# Patient Record
Sex: FEMALE | Race: WHITE | NOT HISPANIC OR LATINO | Employment: PART TIME | URBAN - METROPOLITAN AREA
[De-identification: names, ages, dates, MRNs, and addresses within clinical notes are randomized per-mention and may not be internally consistent; named-entity substitution may affect disease eponyms.]

---

## 2017-04-26 ENCOUNTER — GENERIC CONVERSION - ENCOUNTER (OUTPATIENT)
Dept: OTHER | Facility: OTHER | Age: 27
End: 2017-04-26

## 2017-05-25 ENCOUNTER — ALLSCRIPTS OFFICE VISIT (OUTPATIENT)
Dept: OTHER | Facility: OTHER | Age: 27
End: 2017-05-25

## 2017-05-25 DIAGNOSIS — E66.01 MORBID (SEVERE) OBESITY DUE TO EXCESS CALORIES (HCC): ICD-10-CM

## 2017-06-28 ENCOUNTER — GENERIC CONVERSION - ENCOUNTER (OUTPATIENT)
Dept: OTHER | Facility: OTHER | Age: 27
End: 2017-06-28

## 2017-07-03 RX ORDER — SERTRALINE HYDROCHLORIDE 100 MG/1
150 TABLET, FILM COATED ORAL DAILY
COMMUNITY
End: 2018-03-24 | Stop reason: SDUPTHER

## 2017-07-04 ENCOUNTER — ANESTHESIA EVENT (OUTPATIENT)
Dept: GASTROENTEROLOGY | Facility: HOSPITAL | Age: 27
End: 2017-07-04
Payer: COMMERCIAL

## 2017-07-05 ENCOUNTER — ANESTHESIA (OUTPATIENT)
Dept: GASTROENTEROLOGY | Facility: HOSPITAL | Age: 27
End: 2017-07-05
Payer: COMMERCIAL

## 2017-07-05 ENCOUNTER — HOSPITAL ENCOUNTER (OUTPATIENT)
Facility: HOSPITAL | Age: 27
Setting detail: OUTPATIENT SURGERY
Discharge: HOME | End: 2017-07-05
Attending: SURGERY | Admitting: SURGERY
Payer: COMMERCIAL

## 2017-07-05 VITALS
HEIGHT: 65 IN | WEIGHT: 255 LBS | RESPIRATION RATE: 16 BRPM | SYSTOLIC BLOOD PRESSURE: 118 MMHG | HEART RATE: 78 BPM | BODY MASS INDEX: 42.49 KG/M2 | OXYGEN SATURATION: 100 % | DIASTOLIC BLOOD PRESSURE: 58 MMHG | TEMPERATURE: 97.8 F

## 2017-07-05 DIAGNOSIS — E66.9 OBESITY: ICD-10-CM

## 2017-07-05 LAB — EXT PREGNANCY TEST URINE: NEGATIVE

## 2017-07-05 PROCEDURE — 88305 TISSUE EXAM BY PATHOLOGIST: CPT | Performed by: SURGERY

## 2017-07-05 PROCEDURE — 88342 IMHCHEM/IMCYTCHM 1ST ANTB: CPT | Performed by: SURGERY

## 2017-07-05 PROCEDURE — 81025 URINE PREGNANCY TEST: CPT | Performed by: ANESTHESIOLOGY

## 2017-07-05 RX ORDER — SODIUM CHLORIDE 9 MG/ML
125 INJECTION, SOLUTION INTRAVENOUS CONTINUOUS
Status: DISCONTINUED | OUTPATIENT
Start: 2017-07-05 | End: 2017-07-05 | Stop reason: HOSPADM

## 2017-07-05 RX ORDER — PROPOFOL 10 MG/ML
INJECTION, EMULSION INTRAVENOUS AS NEEDED
Status: DISCONTINUED | OUTPATIENT
Start: 2017-07-05 | End: 2017-07-05 | Stop reason: SURG

## 2017-07-05 RX ADMIN — PROPOFOL 100 MG: 10 INJECTION, EMULSION INTRAVENOUS at 08:57

## 2017-07-05 RX ADMIN — LIDOCAINE HYDROCHLORIDE 100 MG: 20 INJECTION, SOLUTION INTRAVENOUS at 08:55

## 2017-07-05 RX ADMIN — PROPOFOL 100 MG: 10 INJECTION, EMULSION INTRAVENOUS at 08:55

## 2017-07-05 RX ADMIN — SODIUM CHLORIDE 125 ML/HR: 0.9 INJECTION, SOLUTION INTRAVENOUS at 08:40

## 2017-07-10 ENCOUNTER — ALLSCRIPTS OFFICE VISIT (OUTPATIENT)
Dept: OTHER | Facility: OTHER | Age: 27
End: 2017-07-10

## 2017-07-11 ENCOUNTER — GENERIC CONVERSION - ENCOUNTER (OUTPATIENT)
Dept: OTHER | Facility: OTHER | Age: 27
End: 2017-07-11

## 2017-12-27 ENCOUNTER — ALLSCRIPTS OFFICE VISIT (OUTPATIENT)
Dept: OTHER | Facility: OTHER | Age: 27
End: 2017-12-27
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
## 2018-01-12 VITALS
WEIGHT: 258.03 LBS | DIASTOLIC BLOOD PRESSURE: 80 MMHG | SYSTOLIC BLOOD PRESSURE: 122 MMHG | TEMPERATURE: 98.1 F | BODY MASS INDEX: 41.47 KG/M2 | HEART RATE: 79 BPM | HEIGHT: 66 IN | RESPIRATION RATE: 18 BRPM

## 2018-01-12 VITALS
SYSTOLIC BLOOD PRESSURE: 104 MMHG | DIASTOLIC BLOOD PRESSURE: 60 MMHG | WEIGHT: 261.6 LBS | TEMPERATURE: 97.5 F | HEIGHT: 66 IN | RESPIRATION RATE: 18 BRPM | BODY MASS INDEX: 42.04 KG/M2 | HEART RATE: 74 BPM

## 2018-01-13 VITALS — BODY MASS INDEX: 40.5 KG/M2 | HEIGHT: 66 IN | WEIGHT: 252 LBS

## 2018-01-13 VITALS — WEIGHT: 261.6 LBS | BODY MASS INDEX: 42.04 KG/M2 | HEIGHT: 66 IN

## 2018-01-15 NOTE — RESULT NOTES
Verified Results  (Q) CBC (INCLUDES DIFF/PLT) (REFL) [id number] 12:00AM Rayna New     Test Name Result Flag Reference   WHITE BLOOD CELL COUNT 8 7 Thousand/uL  3 8-10 8   RED BLOOD CELL COUNT 4 94 Million/uL  3 80-5 10   HEMOGLOBIN 13 9 g/dL  11 7-15 5   HEMATOCRIT 42 9 %  35 0-45 0   MCV 86 9 fL  80 0-100 0   MCH 28 2 pg  27 0-33 0   MCHC 32 5 g/dL  32 0-36 0   RDW 14 2 %  11 0-15 0   PLATELET COUNT 272 Thousand/uL  140-400   MPV 8 0 fL  7 5-11 5   ABSOLUTE NEUTROPHILS 6029 cells/uL  6263-6883   ABSOLUTE LYMPHOCYTES 2010 cells/uL  850-3900   ABSOLUTE MONOCYTES 487 cells/uL  200-950   ABSOLUTE EOSINOPHILS 131 cells/uL     ABSOLUTE BASOPHILS 44 cells/uL  0-200   NEUTROPHILS 69 3 %     LYMPHOCYTES 23 1 %     MONOCYTES 5 6 %     EOSINOPHILS 1 5 %     BASOPHILS 0 5 %       (Q) COMPREHENSIVE METABOLIC PNL W/ADJUSTED CALCIUM 00Qzr1171 12:00AM timeplazza New     Test Name Result Flag Reference   GLUCOSE 97 mg/dL  65-99   Fasting reference interval   UREA NITROGEN (BUN) 15 mg/dL  7-25   CREATININE 0 63 mg/dL  0 50-1 10   eGFR NON-AFR   AMERICAN 126 mL/min/1 73m2  > OR = 60   eGFR AFRICAN AMERICAN 146 mL/min/1 73m2  > OR = 60   BUN/CREATININE RATIO   7-15   NOT APPLICABLE (calc)   SODIUM 139 mmol/L  135-146   POTASSIUM 4 3 mmol/L  3 5-5 3   CHLORIDE 105 mmol/L     CARBON DIOXIDE 21 mmol/L  20-31   CALCIUM 9 5 mg/dL  8 6-10 2   CALCIUM (ADJUSTED FOR$ALBUMIN) 9 4 mg/dL (calc)  8 6-10 2   PROTEIN, TOTAL 7 5 g/dL  6 1-8 1   ALBUMIN 4 5 g/dL  3 6-5 1   GLOBULIN 3 0 g/dL (calc)  1 9-3 7   ALBUMIN/GLOBULIN RATIO 1 5 (calc)  1 0-2 5   BILIRUBIN, TOTAL 0 3 mg/dL  0 2-1 2   ALKALINE PHOSPHATASE 103 U/L     AST 12 U/L  10-30   ALT 13 U/L  6-29     (Q) TSH, 3RD GENERATION 13Fvv4490 12:00AM timeplazza New     Test Name Result Flag Reference   TSH 1 67 mIU/L     Reference Range                         > or = 20 Years  0 40-4 50                              Pregnancy Ranges            First trimester    0 26-2 66 Second trimester   0 55-2 73            Third trimester    0 43-2 91
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
## 2018-01-23 VITALS
SYSTOLIC BLOOD PRESSURE: 116 MMHG | HEIGHT: 66 IN | DIASTOLIC BLOOD PRESSURE: 68 MMHG | TEMPERATURE: 97.6 F | HEART RATE: 127 BPM | OXYGEN SATURATION: 97 % | BODY MASS INDEX: 43.39 KG/M2 | WEIGHT: 270 LBS

## 2018-03-24 DIAGNOSIS — F33.0 MILD EPISODE OF RECURRENT MAJOR DEPRESSIVE DISORDER (HCC): Primary | ICD-10-CM

## 2018-03-26 RX ORDER — SERTRALINE HYDROCHLORIDE 100 MG/1
TABLET, FILM COATED ORAL
Qty: 135 TABLET | Refills: 0 | Status: SHIPPED | OUTPATIENT
Start: 2018-03-26 | End: 2018-06-27 | Stop reason: SDUPTHER

## 2018-06-27 DIAGNOSIS — Z13.29 SCREENING FOR ENDOCRINE, METABOLIC AND IMMUNITY DISORDER: ICD-10-CM

## 2018-06-27 DIAGNOSIS — Z13.0 SCREENING FOR DEFICIENCY ANEMIA: ICD-10-CM

## 2018-06-27 DIAGNOSIS — Z13.228 SCREENING FOR ENDOCRINE, METABOLIC AND IMMUNITY DISORDER: ICD-10-CM

## 2018-06-27 DIAGNOSIS — R53.82 CHRONIC FATIGUE: ICD-10-CM

## 2018-06-27 DIAGNOSIS — Z13.0 SCREENING FOR ENDOCRINE, METABOLIC AND IMMUNITY DISORDER: ICD-10-CM

## 2018-06-27 DIAGNOSIS — Z13.1 SCREENING FOR DIABETES MELLITUS (DM): ICD-10-CM

## 2018-06-27 DIAGNOSIS — F33.0 MILD EPISODE OF RECURRENT MAJOR DEPRESSIVE DISORDER (HCC): ICD-10-CM

## 2018-06-27 DIAGNOSIS — Z13.220 SCREENING FOR LIPID DISORDERS: ICD-10-CM

## 2018-06-27 DIAGNOSIS — D64.9 ANEMIA, UNSPECIFIED TYPE: Primary | ICD-10-CM

## 2018-06-27 PROBLEM — Z87.59 HISTORY OF POSTPARTUM DEPRESSION: Status: ACTIVE | Noted: 2017-08-03

## 2018-06-27 PROBLEM — Z82.79 FAMILY HISTORY OF CONGENITAL HEART DEFECT: Status: ACTIVE | Noted: 2017-09-11

## 2018-06-27 PROBLEM — O09.90 HRP (HIGH RISK PREGNANCY): Status: ACTIVE | Noted: 2017-08-03

## 2018-06-27 PROBLEM — D62 ACUTE BLOOD LOSS ANEMIA: Status: ACTIVE | Noted: 2018-03-13

## 2018-06-27 PROBLEM — O09.891 MEDICATION EXPOSURE DURING FIRST TRIMESTER OF PREGNANCY: Status: ACTIVE | Noted: 2017-08-03

## 2018-06-27 PROBLEM — O99.340 DEPRESSION AFFECTING PREGNANCY: Status: ACTIVE | Noted: 2017-08-03

## 2018-06-27 PROBLEM — F32.A DEPRESSION AFFECTING PREGNANCY: Status: ACTIVE | Noted: 2017-08-03

## 2018-06-27 PROBLEM — O09.299 HISTORY OF MACROSOMIA IN INFANT IN PRIOR PREGNANCY, CURRENTLY PREGNANT: Status: ACTIVE | Noted: 2017-09-11

## 2018-06-27 PROBLEM — O99.210 MATERNAL OBESITY AFFECTING PREGNANCY, ANTEPARTUM: Status: ACTIVE | Noted: 2017-08-03

## 2018-06-27 PROBLEM — Z98.891 HISTORY OF CESAREAN SECTION: Status: ACTIVE | Noted: 2017-08-03

## 2018-06-27 PROBLEM — O34.219 PREVIOUS CESAREAN SECTION COMPLICATING PREGNANCY, ANTEPARTUM CONDITION OR COMPLICATION: Status: ACTIVE | Noted: 2018-01-18

## 2018-06-27 PROBLEM — Z86.59 HISTORY OF POSTPARTUM DEPRESSION: Status: ACTIVE | Noted: 2017-08-03

## 2018-06-27 PROBLEM — O40.9XX0 POLYHYDRAMNIOS, ANTEPARTUM COMPLICATION: Status: ACTIVE | Noted: 2018-02-21

## 2018-06-27 PROBLEM — O34.219 MATERNAL CARE FOR SCAR FROM PREVIOUS CESAREAN DELIVERY: Status: ACTIVE | Noted: 2018-03-12

## 2018-06-27 PROBLEM — E66.01 MORBID OBESITY (HCC): Status: ACTIVE | Noted: 2017-04-26

## 2018-06-27 RX ORDER — SERTRALINE HYDROCHLORIDE 100 MG/1
TABLET, FILM COATED ORAL
Qty: 135 TABLET | Refills: 0 | Status: SHIPPED | OUTPATIENT
Start: 2018-06-27 | End: 2018-12-14 | Stop reason: SDUPTHER

## 2018-06-27 NOTE — TELEPHONE ENCOUNTER
Pt will need blood work for being on medication on a daily basis,  I will enter for [facility] since she has [place]

## 2018-12-11 ENCOUNTER — TELEPHONE (OUTPATIENT)
Dept: FAMILY MEDICINE CLINIC | Facility: CLINIC | Age: 28
End: 2018-12-11

## 2018-12-11 ENCOUNTER — CLINICAL SUPPORT (OUTPATIENT)
Dept: BARIATRICS | Facility: CLINIC | Age: 28
End: 2018-12-11

## 2018-12-11 VITALS — WEIGHT: 275 LBS | HEIGHT: 65 IN | BODY MASS INDEX: 45.82 KG/M2

## 2018-12-11 DIAGNOSIS — E66.01 OBESITY, CLASS III, BMI 40-49.9 (MORBID OBESITY) (HCC): Primary | ICD-10-CM

## 2018-12-11 DIAGNOSIS — E66.01 MORBID OBESITY (HCC): Primary | ICD-10-CM

## 2018-12-11 PROCEDURE — RECHECK: Performed by: DIETITIAN, REGISTERED

## 2018-12-11 NOTE — PROGRESS NOTES
Bariatric Behavioral Health Evaluation    Presenting Problem:  Patient has been unsuccessful with weight loss efforts  Patient wants to improve quality of life  Is the patient seeking Bariatric Surgery Eval? Yes  If yes how long have you researched this surgery option  Patient has considered bariatric surgery for 2 years  Realizes Post- Op Requirements? Yes, patient has acquaintances with bariatric surgery  Psychiatric/Psychological Treatment Diagnosis: In  patient diagnosed with post partum depression  Patient currently diagnosed with depression  She is monitored and treated by PCP  Outpatient Counselor No     Psychiatrist No     Have you had Inpatient Treatment? No    Family Constellation (include relationship with each and Psych/Med HX) None reported   Mother is 62years old, father is 54years old, 2 living sisters and 5 brothers  One sister  at 15years of age( 2018)   1 year; patient has 2 children  Domestic Violence No    Abuse History: None reported     Additional comments/stressors related to family/relationships/peer support; grief( sister's death)    Physical/Psychological Assessment:     Appearance: appropriate  Sociability: average  Affect: appropriate  Mood: calm  Thought Process: coherent  Speech: normal  Content: no impairment  Orientation: person  Yes   Insight: emotional  good    Risk Assessment:     none    Recommendations: Recommended for surgery  yes    Risk of Harm to Self or Others: None reported     Access to weapons yes     Weapons secured by; hunting  Weapons are in a gun safe  Based on the previous information, the client presents the following risk of harm to self or others: low     Note   Completed Behavioral Health Assessment  Provided patient education as needed  Patient admits  to  Axis 1 diagnosis and is currently taking medication prescribed by PCP  Chano Cerrato  Patient  meets criteria for [facility] b[facility]  surgery program and is therefore referred to surgeon  David Henning  Completed Behavioral Health Assessment  Provided patient education as needed  Patient denies/admits  to  Axis 1 diagnosis and is currently taking medication prescribed by PCP or psychiatrist  Patient  meets criteria for [address] [place] [address] bariatric  surgery program and is therefore referred to surgeon

## 2018-12-14 ENCOUNTER — OFFICE VISIT (OUTPATIENT)
Dept: FAMILY MEDICINE CLINIC | Facility: CLINIC | Age: 28
End: 2018-12-14
Payer: COMMERCIAL

## 2018-12-14 VITALS
BODY MASS INDEX: 45.82 KG/M2 | HEART RATE: 98 BPM | SYSTOLIC BLOOD PRESSURE: 112 MMHG | OXYGEN SATURATION: 98 % | DIASTOLIC BLOOD PRESSURE: 80 MMHG | WEIGHT: 275 LBS | TEMPERATURE: 97.7 F | HEIGHT: 65 IN

## 2018-12-14 DIAGNOSIS — F33.0 MILD EPISODE OF RECURRENT MAJOR DEPRESSIVE DISORDER (HCC): ICD-10-CM

## 2018-12-14 DIAGNOSIS — F41.9 ANXIETY: Primary | ICD-10-CM

## 2018-12-14 DIAGNOSIS — R53.82 CHRONIC FATIGUE: ICD-10-CM

## 2018-12-14 PROBLEM — O09.299 HISTORY OF MACROSOMIA IN INFANT IN PRIOR PREGNANCY, CURRENTLY PREGNANT: Status: RESOLVED | Noted: 2017-09-11 | Resolved: 2018-12-14

## 2018-12-14 PROBLEM — O99.210 MATERNAL OBESITY AFFECTING PREGNANCY, ANTEPARTUM: Status: RESOLVED | Noted: 2017-08-03 | Resolved: 2018-12-14

## 2018-12-14 PROCEDURE — 3008F BODY MASS INDEX DOCD: CPT | Performed by: FAMILY MEDICINE

## 2018-12-14 PROCEDURE — 99214 OFFICE O/P EST MOD 30 MIN: CPT | Performed by: FAMILY MEDICINE

## 2018-12-14 PROCEDURE — 1036F TOBACCO NON-USER: CPT | Performed by: FAMILY MEDICINE

## 2018-12-14 RX ORDER — HYDROXYZINE HYDROCHLORIDE 25 MG/1
25 TABLET, FILM COATED ORAL 3 TIMES DAILY PRN
Qty: 90 TABLET | Refills: 0 | Status: SHIPPED | OUTPATIENT
Start: 2018-12-14 | End: 2019-02-27 | Stop reason: ALTCHOICE

## 2018-12-14 RX ORDER — SERTRALINE HYDROCHLORIDE 100 MG/1
150 TABLET, FILM COATED ORAL DAILY
Qty: 135 TABLET | Refills: 0 | Status: SHIPPED | OUTPATIENT
Start: 2018-12-14 | End: 2019-09-27

## 2018-12-14 NOTE — PATIENT INSTRUCTIONS
PT WILL BE GETTING BLOOD WORK FOR BARIATRIC SURGEON  Proceeding with WEIGHT LOSS SURGERY  RENEWAL OF SERTRALINE, CONSIDER INCREASING TO 200MG DAILY   ATARAX 25MG 1/2-1 TAB 3 TIMES A DAY AS NEEDED   CONSIDER CHANGING TO [facility] WHEN AVAILABLE

## 2018-12-14 NOTE — PROGRESS NOTES
Subjective:   Chief Complaint   Patient presents with    Follow-up     PT IS HERE FOR RX REFILLS  PT WILL NEED REFERRAL FOR BARIATRIC SURGERY  Patient ID: Michael Mitchell is a 33 y o  female  PT BMI 45 76 TODAY, AND QUALIFIES FOR WEIGHT LOSS SURGERY  HAD EVALUATION 1 YEAR AGO AND  THEN COULD NOT COMPLETE DUE TO PREGNANCY, NOW WOULD LIKE TO RESTART PROCESS  Has been trying to lose weight  She will get full blood work up with bariatric surgeon  Increased stress and anxiety  The following portions of the patient's history were reviewed and updated as appropriate: allergies, current medications, past family history, past medical history, past social history, past surgical history and problem list     Review of Systems   Constitutional: Positive for fatigue  Negative for fever  HENT: Negative  Eyes: Negative  Respiratory: Negative  Negative for cough  Cardiovascular: Negative  Gastrointestinal: Negative  Endocrine: Negative  Genitourinary: Negative  Musculoskeletal: Negative  Skin: Negative  Allergic/Immunologic: Negative  Neurological: Negative  Psychiatric/Behavioral: Negative  Objective:  Vitals:    12/14/18 1031   BP: 112/80   Pulse: 98   Temp: 97 7 °F (36 5 °C)   SpO2: 98%   Weight: 125 kg (275 lb)   Height: 5' 5" (1 651 m)      Physical Exam   Constitutional: She is oriented to person, place, and time  She appears well-developed and well-nourished  obese   HENT:   Head: Normocephalic and atraumatic  Cardiovascular: Normal rate, regular rhythm and normal heart sounds  Pulmonary/Chest: Effort normal and breath sounds normal    Abdominal: Soft  Bowel sounds are normal    Neurological: She is alert and oriented to person, place, and time  Skin: Skin is warm and dry  Psychiatric: She has a normal mood and affect  Her behavior is normal  Judgment and thought content normal    Nursing note and vitals reviewed          Assessment/Plan:    No problem-specific Assessment & Plan notes found for this encounter  Diagnoses and all orders for this visit:    Anxiety  Comments:  add atarax 1/2-1 tab 3 times a day as needed   consider changing to buspar when available  consider increasing sertraline  Orders:  -     hydrOXYzine HCL (ATARAX) 25 mg tablet; Take 1 tablet (25 mg total) by mouth 3 (three) times a day as needed for anxiety    Mild episode of recurrent major depressive disorder ([facility] [address] )  Comments:  consider increase in sertraline, pt would prefer as needed medication currently  Orders:  -     sertraline (ZOLOFT) 100 mg tablet; Take 1 5 tablets (150 mg total) by mouth daily    Chronic fatigue  Comments:  will bet blood work done for bariatric surgery - cbc,tsh    BMI 45 0-49 9, adult [facility])  Comments:  continue with bariatric workup   Orders:  -     Ambulatory referral to Bariatric Surgery; Future    Other orders  -     Discontinue: Ascorbic Acid, Vitamin C, (VITAMIN C) 100 MG tablet; Take 250 mg by mouth  -     Prenatal Multivit-Min-Fe-FA (PRE-CHRISTINE PO);  Take by mouth

## 2019-01-09 ENCOUNTER — OFFICE VISIT (OUTPATIENT)
Dept: CARDIOLOGY CLINIC | Facility: CLINIC | Age: 29
End: 2019-01-09
Payer: COMMERCIAL

## 2019-01-09 VITALS
SYSTOLIC BLOOD PRESSURE: 110 MMHG | HEIGHT: 65 IN | WEIGHT: 282 LBS | DIASTOLIC BLOOD PRESSURE: 80 MMHG | HEART RATE: 74 BPM | BODY MASS INDEX: 46.98 KG/M2

## 2019-01-09 DIAGNOSIS — E66.01 MORBID OBESITY (HCC): Primary | ICD-10-CM

## 2019-01-09 DIAGNOSIS — Z01.810 PREOP CARDIOVASCULAR EXAM: ICD-10-CM

## 2019-01-09 PROCEDURE — 93000 ELECTROCARDIOGRAM COMPLETE: CPT | Performed by: INTERNAL MEDICINE

## 2019-01-09 PROCEDURE — 99214 OFFICE O/P EST MOD 30 MIN: CPT | Performed by: INTERNAL MEDICINE
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
## 2019-01-11 ENCOUNTER — OFFICE VISIT (OUTPATIENT)
Dept: BARIATRICS | Facility: CLINIC | Age: 29
End: 2019-01-11
Payer: COMMERCIAL

## 2019-01-11 VITALS
BODY MASS INDEX: 44.84 KG/M2 | SYSTOLIC BLOOD PRESSURE: 112 MMHG | TEMPERATURE: 97.5 F | HEIGHT: 66 IN | WEIGHT: 279 LBS | HEART RATE: 80 BPM | DIASTOLIC BLOOD PRESSURE: 84 MMHG

## 2019-01-11 DIAGNOSIS — F41.9 ANXIETY: ICD-10-CM

## 2019-01-11 DIAGNOSIS — E66.01 MORBID OBESITY (HCC): Primary | ICD-10-CM

## 2019-01-11 PROCEDURE — 99214 OFFICE O/P EST MOD 30 MIN: CPT | Performed by: SURGERY
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
## 2019-01-14 ENCOUNTER — HOSPITAL ENCOUNTER (OUTPATIENT)
Dept: NON INVASIVE DIAGNOSTICS | Facility: CLINIC | Age: 29
Discharge: HOME | End: 2019-01-14
Payer: COMMERCIAL

## 2019-01-14 DIAGNOSIS — Z01.810 PREOP CARDIOVASCULAR EXAM: ICD-10-CM

## 2019-01-14 PROCEDURE — 93018 CV STRESS TEST I&R ONLY: CPT | Performed by: INTERNAL MEDICINE

## 2019-01-14 PROCEDURE — 93016 CV STRESS TEST SUPVJ ONLY: CPT | Performed by: INTERNAL MEDICINE

## 2019-01-14 PROCEDURE — 93017 CV STRESS TEST TRACING ONLY: CPT

## 2019-01-15 LAB
CHEST PAIN STATEMENT: NORMAL
MAX DIASTOLIC BP: 60 MMHG
MAX HEART RATE: 190 BPM
MAX PREDICTED HEART RATE: 193 BPM
MAX. SYSTOLIC BP: 170 MMHG
PROTOCOL NAME: NORMAL
REASON FOR TERMINATION: NORMAL
TARGET HR FORMULA: NORMAL
TEST INDICATION: NORMAL
TIME IN EXERCISE PHASE: NORMAL

## 2019-01-16 ENCOUNTER — TELEPHONE (OUTPATIENT)
Dept: BARIATRICS | Facility: CLINIC | Age: 29
End: 2019-01-16

## 2019-01-16 ENCOUNTER — TELEPHONE (OUTPATIENT)
Dept: CARDIOLOGY CLINIC | Facility: CLINIC | Age: 29
End: 2019-01-16

## 2019-01-16 NOTE — TELEPHONE ENCOUNTER
Spoke to [facility] @ Dr Putnam Failing office in regards to cardiac clearance she reviewed notes stated patient had no contradictions and will have a note or addendum added to last office visit

## 2019-01-16 NOTE — TELEPHONE ENCOUNTER
Pt called requesting results for Stress test   Pt was informed that test appeared to be normal   If Dr Mindy Black had anything to add pt would receive a call with that information  Eleonora from [facility] also called stating she needed a letter added to pt's chart clearing pt for Bariatric Sx  Date TBD  Letter was sent to Dr Mindy Black to be signed and in chart for reference

## 2019-01-18 DIAGNOSIS — Z01.812 BLOOD TESTS PRIOR TO TREATMENT OR PROCEDURE: ICD-10-CM

## 2019-01-20 LAB
25(OH)D3 SERPL-MCNC: 13 NG/ML (ref 30–100)
ALBUMIN SERPL-MCNC: 4.4 G/DL (ref 3.6–5.1)
ALBUMIN/GLOB SERPL: 1.5 (CALC) (ref 1–2.5)
ALP SERPL-CCNC: 82 U/L (ref 33–115)
ALT SERPL-CCNC: 11 U/L (ref 6–29)
AST SERPL-CCNC: 13 U/L (ref 10–30)
BASOPHILS # BLD AUTO: 37 CELLS/UL (ref 0–200)
BASOPHILS NFR BLD AUTO: 0.6 %
BILIRUB SERPL-MCNC: 0.4 MG/DL (ref 0.2–1.2)
BUN SERPL-MCNC: 13 MG/DL (ref 7–25)
BUN/CREAT SERPL: NORMAL (CALC) (ref 6–22)
CALCIUM SERPL-MCNC: 9 MG/DL (ref 8.6–10.2)
CHLORIDE SERPL-SCNC: 105 MMOL/L (ref 98–110)
CHOLEST SERPL-MCNC: 195 MG/DL
CHOLEST/HDLC SERPL: 4.2 (CALC)
CO2 SERPL-SCNC: 26 MMOL/L (ref 20–32)
CREAT SERPL-MCNC: 0.66 MG/DL (ref 0.5–1.1)
EOSINOPHIL # BLD AUTO: 81 CELLS/UL (ref 15–500)
EOSINOPHIL NFR BLD AUTO: 1.3 %
ERYTHROCYTE [DISTWIDTH] IN BLOOD BY AUTOMATED COUNT: 13.4 % (ref 11–15)
GLOBULIN SER CALC-MCNC: 2.9 G/DL (CALC) (ref 1.9–3.7)
GLUCOSE SERPL-MCNC: 84 MG/DL (ref 65–99)
HCT VFR BLD AUTO: 38.4 % (ref 35–45)
HDLC SERPL-MCNC: 46 MG/DL
HGB BLD-MCNC: 12.8 G/DL (ref 11.7–15.5)
LDLC SERPL CALC-MCNC: 118 MG/DL (CALC)
LYMPHOCYTES # BLD AUTO: 2536 CELLS/UL (ref 850–3900)
LYMPHOCYTES NFR BLD AUTO: 40.9 %
MCH RBC QN AUTO: 27.7 PG (ref 27–33)
MCHC RBC AUTO-ENTMCNC: 33.3 G/DL (ref 32–36)
MCV RBC AUTO: 83.1 FL (ref 80–100)
MONOCYTES # BLD AUTO: 440 CELLS/UL (ref 200–950)
MONOCYTES NFR BLD AUTO: 7.1 %
NEUTROPHILS # BLD AUTO: 3106 CELLS/UL (ref 1500–7800)
NEUTROPHILS NFR BLD AUTO: 50.1 %
NONHDLC SERPL-MCNC: 149 MG/DL (CALC)
PLATELET # BLD AUTO: 286 THOUSAND/UL (ref 140–400)
PMV BLD REES-ECKER: 10.1 FL (ref 7.5–12.5)
POTASSIUM SERPL-SCNC: 4.1 MMOL/L (ref 3.5–5.3)
PROT SERPL-MCNC: 7.3 G/DL (ref 6.1–8.1)
RBC # BLD AUTO: 4.62 MILLION/UL (ref 3.8–5.1)
SL AMB EGFR AFRICAN AMERICAN: 140 ML/MIN/1.73M2
SL AMB EGFR NON AFRICAN AMERICAN: 121 ML/MIN/1.73M2
SODIUM SERPL-SCNC: 137 MMOL/L (ref 135–146)
TRIGL SERPL-MCNC: 191 MG/DL
VIT A SERPL-MCNC: 48 MCG/DL (ref 38–98)
VIT B1 BLD-SCNC: 116 NMOL/L (ref 78–185)
VIT B12 SERPL-MCNC: 604 PG/ML (ref 200–1100)
WBC # BLD AUTO: 6.2 THOUSAND/UL (ref 3.8–10.8)

## 2019-01-24 LAB — TSH SERPL-ACNC: 1.3 MIU/L

## 2019-02-06 ENCOUNTER — ANESTHESIA EVENT (OUTPATIENT)
Dept: PERIOP | Facility: HOSPITAL | Age: 29
DRG: 621 | End: 2019-02-06
Payer: COMMERCIAL

## 2019-02-07 ENCOUNTER — OFFICE VISIT (OUTPATIENT)
Dept: BARIATRICS | Facility: CLINIC | Age: 29
End: 2019-02-07
Payer: COMMERCIAL

## 2019-02-07 VITALS
WEIGHT: 272.5 LBS | DIASTOLIC BLOOD PRESSURE: 80 MMHG | HEIGHT: 66 IN | TEMPERATURE: 97.3 F | BODY MASS INDEX: 43.79 KG/M2 | HEART RATE: 95 BPM | SYSTOLIC BLOOD PRESSURE: 108 MMHG

## 2019-02-07 DIAGNOSIS — E66.01 MORBID OBESITY (HCC): Primary | ICD-10-CM

## 2019-02-07 DIAGNOSIS — E66.01 MORBID (SEVERE) OBESITY DUE TO EXCESS CALORIES (HCC): Primary | ICD-10-CM

## 2019-02-07 DIAGNOSIS — F33.0 MILD EPISODE OF RECURRENT MAJOR DEPRESSIVE DISORDER (HCC): ICD-10-CM

## 2019-02-07 DIAGNOSIS — F41.9 ANXIETY: ICD-10-CM

## 2019-02-07 DIAGNOSIS — D64.9 ANEMIA, UNSPECIFIED TYPE: ICD-10-CM

## 2019-02-07 PROCEDURE — 99213 OFFICE O/P EST LOW 20 MIN: CPT | Performed by: SURGERY

## 2019-02-07 RX ORDER — ACETAMINOPHEN 500 MG
500 TABLET ORAL EVERY 6 HOURS PRN
COMMUNITY
End: 2020-04-13 | Stop reason: ALTCHOICE

## 2019-02-07 RX ORDER — HEPARIN SODIUM 5000 [USP'U]/ML
5000 INJECTION, SOLUTION INTRAVENOUS; SUBCUTANEOUS
Status: CANCELLED | OUTPATIENT
Start: 2019-02-25 | End: 2019-02-26

## 2019-02-07 RX ORDER — OMEPRAZOLE 20 MG/1
20 CAPSULE, DELAYED RELEASE ORAL DAILY
Qty: 90 CAPSULE | Refills: 1 | Status: SHIPPED | OUTPATIENT
Start: 2019-02-07 | End: 2019-02-27 | Stop reason: SDUPTHER

## 2019-02-07 RX ORDER — OXYCODONE HYDROCHLORIDE AND ACETAMINOPHEN 5; 325 MG/1; MG/1
1 TABLET ORAL EVERY 4 HOURS PRN
Qty: 15 TABLET | Refills: 0 | Status: SHIPPED | OUTPATIENT
Start: 2019-02-07 | End: 2019-09-27

## 2019-02-07 NOTE — ANESTHESIA PREPROCEDURE EVALUATION
Review of Systems/Medical History  Patient summary reviewed  Chart reviewed      Cardiovascular  Negative cardio ROS Exercise tolerance (METS): good,     Pulmonary  Negative pulmonary ROS Smoker ex-smoker  ,        GI/Hepatic  Negative GI/hepatic ROS          Negative  ROS        Endo/Other    Obesity  morbid obesity   GYN  Negative gynecology ROS          Hematology  Anemia iron deficiency anemia,    Comment: history of iron deficient anemia Musculoskeletal       Neurology  Negative neurology ROS      Psychology   Negative psychology ROS Anxiety, Depression , being treated for depression,              Physical Exam    Airway    Mallampati score: I  TM Distance: >3 FB  Neck ROM: full     Dental   No notable dental hx     Cardiovascular  Comment: Negative ROS, Rhythm: regular, Rate: normal, Cardiovascular exam normal    Pulmonary  Pulmonary exam normal Breath sounds clear to auscultation,     Other Findings        Anesthesia Plan  ASA Score- 3     Anesthesia Type- general with ASA Monitors  Additional Monitors:   Airway Plan: ETT  Comment: SCOP patch ordered  Plan Factors-Patient not instructed to abstain from smoking on day of procedure  Patient did not smoke on day of surgery  Induction- intravenous  Postoperative Plan- Plan for postoperative opioid use  Informed Consent- Anesthetic plan and risks discussed with patient

## 2019-02-07 NOTE — PRE-PROCEDURE INSTRUCTIONS
Pre-Surgery Instructions:   Medication Instructions    acetaminophen (TYLENOL) 500 mg tablet Patient was instructed by Physician and understands   sertraline (ZOLOFT) 100 mg tablet Patient was instructed by Physician and understands  Seen by Dr Maddy Timmons and was told to stop NSAIDS and supplements one week preop and on DOS if desired she may take Sertraline with sip of water  Instructed on what to do if develops fever/signs of infection prior to surgery-she will call PCP, notifify surgeon and PAT
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
## 2019-02-18 ENCOUNTER — TELEPHONE (OUTPATIENT)
Dept: BARIATRICS | Facility: CLINIC | Age: 29
End: 2019-02-18

## 2019-02-18 NOTE — TELEPHONE ENCOUNTER
Outreach phone call  How is patient coming along with liver shrinking diet? Patient said she has no ? or concerns  It is certainly challenging but she is managing  Patient started Miralax product  Please follow instructions and handout provided at Team meeting  Patient encouraged to contact office if she has any ? or concerns   [place]

## 2019-02-22 ENCOUNTER — TELEPHONE (OUTPATIENT)
Dept: BARIATRICS | Facility: CLINIC | Age: 29
End: 2019-02-22

## 2019-02-22 NOTE — TELEPHONE ENCOUNTER
Received phone call transferred from pt's   Pt is concerned about reaching surgery goal weight  Pt reports she has been maintaining at 265# for several days now, Dr Otoniel Morales told pt "around 260#" at her final H+P  Final H+P notes she needs to lose 10# additional prior to surgery, which would equal a goal weight of 262 5#  Discussed goals with pt  Pt states she has been using Premier Protein, Ensure Max Protein, which she states she has only been doing two per day because they make her gag  Discussed importance of adequate protein and calories to keep her metabolism up for optimal weight loss  Encouraged pt to try other brands and flavors of protein products, recommended four per day mixed with water only  Pt states that she is also drinking more than 80 oz of water or PowerAde Zero daily, and is doing nonstarchy vegetables  Also instructed pt to walk for 60 minutes daily each day  Pt thankful for information

## 2019-02-25 ENCOUNTER — ANESTHESIA (OUTPATIENT)
Dept: PERIOP | Facility: HOSPITAL | Age: 29
DRG: 621 | End: 2019-02-25
Payer: COMMERCIAL

## 2019-02-25 ENCOUNTER — HOSPITAL ENCOUNTER (INPATIENT)
Facility: HOSPITAL | Age: 29
LOS: 1 days | Discharge: HOME | DRG: 621 | End: 2019-02-26
Attending: SURGERY | Admitting: SURGERY
Payer: COMMERCIAL

## 2019-02-25 LAB
EXT PREGNANCY TEST URINE: NEGATIVE
EXT PREGNANCY TEST URINE: NEGATIVE

## 2019-02-25 PROCEDURE — 0D164ZA BYPASS STOMACH TO JEJUNUM, PERCUTANEOUS ENDOSCOPIC APPROACH: ICD-10-PCS | Performed by: SURGERY

## 2019-02-25 PROCEDURE — 43644 LAP GASTRIC BYPASS/ROUX-EN-Y: CPT | Performed by: SURGERY

## 2019-02-25 PROCEDURE — 81025 URINE PREGNANCY TEST: CPT | Performed by: ANESTHESIOLOGY

## 2019-02-25 PROCEDURE — 43644 LAP GASTRIC BYPASS/ROUX-EN-Y: CPT | Performed by: PHYSICIAN ASSISTANT

## 2019-02-25 PROCEDURE — 81025 URINE PREGNANCY TEST: CPT | Performed by: SURGERY

## 2019-02-25 PROCEDURE — 0DJ08ZZ INSPECTION OF UPPER INTESTINAL TRACT, VIA NATURAL OR ARTIFICIAL OPENING ENDOSCOPIC: ICD-10-PCS | Performed by: SURGERY

## 2019-02-25 DEVICE — SEAMGUARD STPL REINF ENDO GIA ULTRA UNIV 60 PURPLE: Type: IMPLANTABLE DEVICE | Site: ABDOMEN | Status: FUNCTIONAL

## 2019-02-25 RX ORDER — SODIUM CHLORIDE, SODIUM LACTATE, POTASSIUM CHLORIDE, CALCIUM CHLORIDE 600; 310; 30; 20 MG/100ML; MG/100ML; MG/100ML; MG/100ML
75 INJECTION, SOLUTION INTRAVENOUS CONTINUOUS
Status: DISCONTINUED | OUTPATIENT
Start: 2019-02-25 | End: 2019-02-26 | Stop reason: HOSPADM

## 2019-02-25 RX ORDER — DEXAMETHASONE SODIUM PHOSPHATE 4 MG/ML
INJECTION, SOLUTION INTRA-ARTICULAR; INTRALESIONAL; INTRAMUSCULAR; INTRAVENOUS; SOFT TISSUE AS NEEDED
Status: DISCONTINUED | OUTPATIENT
Start: 2019-02-25 | End: 2019-02-25 | Stop reason: SURG

## 2019-02-25 RX ORDER — SCOLOPAMINE TRANSDERMAL SYSTEM 1 MG/1
1 PATCH, EXTENDED RELEASE TRANSDERMAL ONCE AS NEEDED
Status: DISCONTINUED | OUTPATIENT
Start: 2019-02-25 | End: 2019-02-25

## 2019-02-25 RX ORDER — MIDAZOLAM HYDROCHLORIDE 1 MG/ML
INJECTION INTRAMUSCULAR; INTRAVENOUS AS NEEDED
Status: DISCONTINUED | OUTPATIENT
Start: 2019-02-25 | End: 2019-02-25 | Stop reason: SURG

## 2019-02-25 RX ORDER — NEOSTIGMINE METHYLSULFATE 1 MG/ML
INJECTION INTRAVENOUS AS NEEDED
Status: DISCONTINUED | OUTPATIENT
Start: 2019-02-25 | End: 2019-02-25 | Stop reason: SURG

## 2019-02-25 RX ORDER — GLYCOPYRROLATE 0.2 MG/ML
INJECTION INTRAMUSCULAR; INTRAVENOUS AS NEEDED
Status: DISCONTINUED | OUTPATIENT
Start: 2019-02-25 | End: 2019-02-25 | Stop reason: SURG

## 2019-02-25 RX ORDER — ONDANSETRON 2 MG/ML
INJECTION INTRAMUSCULAR; INTRAVENOUS AS NEEDED
Status: DISCONTINUED | OUTPATIENT
Start: 2019-02-25 | End: 2019-02-25 | Stop reason: SURG

## 2019-02-25 RX ORDER — PROPOFOL 10 MG/ML
INJECTION, EMULSION INTRAVENOUS AS NEEDED
Status: DISCONTINUED | OUTPATIENT
Start: 2019-02-25 | End: 2019-02-25 | Stop reason: SURG

## 2019-02-25 RX ORDER — BUPIVACAINE HYDROCHLORIDE AND EPINEPHRINE 5; 5 MG/ML; UG/ML
INJECTION, SOLUTION PERINEURAL AS NEEDED
Status: DISCONTINUED | OUTPATIENT
Start: 2019-02-25 | End: 2019-02-25 | Stop reason: HOSPADM

## 2019-02-25 RX ORDER — FENTANYL CITRATE 50 UG/ML
INJECTION, SOLUTION INTRAMUSCULAR; INTRAVENOUS AS NEEDED
Status: DISCONTINUED | OUTPATIENT
Start: 2019-02-25 | End: 2019-02-25 | Stop reason: SURG

## 2019-02-25 RX ORDER — OXYCODONE HCL 5 MG/5 ML
10 SOLUTION, ORAL ORAL EVERY 4 HOURS PRN
Status: DISCONTINUED | OUTPATIENT
Start: 2019-02-25 | End: 2019-02-26 | Stop reason: HOSPADM

## 2019-02-25 RX ORDER — PROMETHAZINE HYDROCHLORIDE 25 MG/ML
12.5 INJECTION, SOLUTION INTRAMUSCULAR; INTRAVENOUS EVERY 6 HOURS PRN
Status: DISCONTINUED | OUTPATIENT
Start: 2019-02-25 | End: 2019-02-26 | Stop reason: HOSPADM

## 2019-02-25 RX ORDER — HYDROMORPHONE HCL/PF 1 MG/ML
SYRINGE (ML) INJECTION AS NEEDED
Status: DISCONTINUED | OUTPATIENT
Start: 2019-02-25 | End: 2019-02-25 | Stop reason: SURG

## 2019-02-25 RX ORDER — ACETAMINOPHEN 160 MG/5ML
325 SUSPENSION, ORAL (FINAL DOSE FORM) ORAL EVERY 4 HOURS PRN
Status: DISCONTINUED | OUTPATIENT
Start: 2019-02-25 | End: 2019-02-26 | Stop reason: HOSPADM

## 2019-02-25 RX ORDER — ONDANSETRON 2 MG/ML
4 INJECTION INTRAMUSCULAR; INTRAVENOUS EVERY 4 HOURS PRN
Status: DISCONTINUED | OUTPATIENT
Start: 2019-02-25 | End: 2019-02-26 | Stop reason: HOSPADM

## 2019-02-25 RX ORDER — MORPHINE SULFATE 4 MG/ML
4 INJECTION, SOLUTION INTRAMUSCULAR; INTRAVENOUS EVERY 2 HOUR PRN
Status: DISCONTINUED | OUTPATIENT
Start: 2019-02-25 | End: 2019-02-26 | Stop reason: HOSPADM

## 2019-02-25 RX ORDER — FENTANYL CITRATE/PF 50 MCG/ML
50 SYRINGE (ML) INJECTION
Status: DISCONTINUED | OUTPATIENT
Start: 2019-02-25 | End: 2019-02-25 | Stop reason: HOSPADM

## 2019-02-25 RX ORDER — ONDANSETRON 2 MG/ML
4 INJECTION INTRAMUSCULAR; INTRAVENOUS ONCE AS NEEDED
Status: COMPLETED | OUTPATIENT
Start: 2019-02-25 | End: 2019-02-25

## 2019-02-25 RX ORDER — HEPARIN SODIUM 5000 [USP'U]/ML
5000 INJECTION, SOLUTION INTRAVENOUS; SUBCUTANEOUS
Status: COMPLETED | OUTPATIENT
Start: 2019-02-25 | End: 2019-02-25

## 2019-02-25 RX ORDER — MAGNESIUM HYDROXIDE 1200 MG/15ML
LIQUID ORAL AS NEEDED
Status: DISCONTINUED | OUTPATIENT
Start: 2019-02-25 | End: 2019-02-25 | Stop reason: HOSPADM

## 2019-02-25 RX ORDER — METOCLOPRAMIDE HYDROCHLORIDE 5 MG/ML
10 INJECTION INTRAMUSCULAR; INTRAVENOUS EVERY 6 HOURS PRN
Status: DISCONTINUED | OUTPATIENT
Start: 2019-02-25 | End: 2019-02-26 | Stop reason: HOSPADM

## 2019-02-25 RX ORDER — ROCURONIUM BROMIDE 10 MG/ML
INJECTION, SOLUTION INTRAVENOUS AS NEEDED
Status: DISCONTINUED | OUTPATIENT
Start: 2019-02-25 | End: 2019-02-25 | Stop reason: SURG

## 2019-02-25 RX ORDER — SODIUM CHLORIDE 9 MG/ML
125 INJECTION, SOLUTION INTRAVENOUS CONTINUOUS
Status: DISCONTINUED | OUTPATIENT
Start: 2019-02-25 | End: 2019-02-25

## 2019-02-25 RX ORDER — PANTOPRAZOLE SODIUM 40 MG/1
40 INJECTION, POWDER, FOR SOLUTION INTRAVENOUS
Status: DISCONTINUED | OUTPATIENT
Start: 2019-02-26 | End: 2019-02-26 | Stop reason: HOSPADM

## 2019-02-25 RX ORDER — OXYCODONE HCL 5 MG/5 ML
5 SOLUTION, ORAL ORAL EVERY 4 HOURS PRN
Status: DISCONTINUED | OUTPATIENT
Start: 2019-02-25 | End: 2019-02-26 | Stop reason: HOSPADM

## 2019-02-25 RX ORDER — ACETAMINOPHEN 160 MG/5ML
320 SUSPENSION, ORAL (FINAL DOSE FORM) ORAL EVERY 4 HOURS PRN
Status: DISCONTINUED | OUTPATIENT
Start: 2019-02-25 | End: 2019-02-26 | Stop reason: HOSPADM

## 2019-02-25 RX ADMIN — SODIUM CHLORIDE, SODIUM LACTATE, POTASSIUM CHLORIDE, AND CALCIUM CHLORIDE 125 ML/HR: .6; .31; .03; .02 INJECTION, SOLUTION INTRAVENOUS at 15:24

## 2019-02-25 RX ADMIN — SODIUM CHLORIDE: 0.9 INJECTION, SOLUTION INTRAVENOUS at 13:41

## 2019-02-25 RX ADMIN — PROPOFOL 200 MG: 10 INJECTION, EMULSION INTRAVENOUS at 12:37

## 2019-02-25 RX ADMIN — ROCURONIUM BROMIDE 10 MG: 10 INJECTION INTRAVENOUS at 13:47

## 2019-02-25 RX ADMIN — FENTANYL CITRATE 50 MCG: 50 INJECTION, SOLUTION INTRAMUSCULAR; INTRAVENOUS at 15:25

## 2019-02-25 RX ADMIN — LIDOCAINE HYDROCHLORIDE 80 MG: 20 INJECTION, SOLUTION INTRAVENOUS at 12:37

## 2019-02-25 RX ADMIN — METRONIDAZOLE 500 MG: 500 INJECTION, SOLUTION INTRAVENOUS at 12:57

## 2019-02-25 RX ADMIN — FENTANYL CITRATE 25 MCG: 50 INJECTION, SOLUTION INTRAMUSCULAR; INTRAVENOUS at 14:56

## 2019-02-25 RX ADMIN — ROCURONIUM BROMIDE 10 MG: 10 INJECTION INTRAVENOUS at 13:24

## 2019-02-25 RX ADMIN — ONDANSETRON 4 MG: 2 INJECTION INTRAMUSCULAR; INTRAVENOUS at 14:03

## 2019-02-25 RX ADMIN — ROCURONIUM BROMIDE 10 MG: 10 INJECTION INTRAVENOUS at 13:06

## 2019-02-25 RX ADMIN — SODIUM CHLORIDE, SODIUM LACTATE, POTASSIUM CHLORIDE, AND CALCIUM CHLORIDE 125 ML/HR: .6; .31; .03; .02 INJECTION, SOLUTION INTRAVENOUS at 22:23

## 2019-02-25 RX ADMIN — MORPHINE SULFATE 4 MG: 4 INJECTION INTRAVENOUS at 16:06

## 2019-02-25 RX ADMIN — Medication 2000 MG: at 12:47

## 2019-02-25 RX ADMIN — HYDROMORPHONE HYDROCHLORIDE 0.5 MG: 1 INJECTION, SOLUTION INTRAMUSCULAR; INTRAVENOUS; SUBCUTANEOUS at 13:28

## 2019-02-25 RX ADMIN — ONDANSETRON 4 MG: 2 INJECTION INTRAMUSCULAR; INTRAVENOUS at 15:17

## 2019-02-25 RX ADMIN — SODIUM CHLORIDE 125 ML/HR: 0.9 INJECTION, SOLUTION INTRAVENOUS at 10:55

## 2019-02-25 RX ADMIN — METOCLOPRAMIDE 10 MG: 5 INJECTION, SOLUTION INTRAMUSCULAR; INTRAVENOUS at 16:06

## 2019-02-25 RX ADMIN — SCOPALAMINE 1 PATCH: 1 PATCH, EXTENDED RELEASE TRANSDERMAL at 10:20

## 2019-02-25 RX ADMIN — DEXAMETHASONE SODIUM PHOSPHATE 4 MG: 4 INJECTION, SOLUTION INTRAMUSCULAR; INTRAVENOUS at 12:37

## 2019-02-25 RX ADMIN — ROCURONIUM BROMIDE 50 MG: 10 INJECTION INTRAVENOUS at 12:37

## 2019-02-25 RX ADMIN — ONDANSETRON 4 MG: 2 INJECTION INTRAMUSCULAR; INTRAVENOUS at 19:19

## 2019-02-25 RX ADMIN — FENTANYL CITRATE 50 MCG: 50 INJECTION, SOLUTION INTRAMUSCULAR; INTRAVENOUS at 15:14

## 2019-02-25 RX ADMIN — HEPARIN SODIUM 5000 UNITS: 5000 INJECTION INTRAVENOUS; SUBCUTANEOUS at 11:21

## 2019-02-25 RX ADMIN — MORPHINE SULFATE 2 MG: 2 INJECTION, SOLUTION INTRAMUSCULAR; INTRAVENOUS at 22:20

## 2019-02-25 RX ADMIN — MORPHINE SULFATE 4 MG: 4 INJECTION INTRAVENOUS at 19:20

## 2019-02-25 RX ADMIN — FENTANYL CITRATE 50 MCG: 50 INJECTION, SOLUTION INTRAMUSCULAR; INTRAVENOUS at 14:28

## 2019-02-25 RX ADMIN — FENTANYL CITRATE 50 MCG: 50 INJECTION, SOLUTION INTRAMUSCULAR; INTRAVENOUS at 13:47

## 2019-02-25 RX ADMIN — SODIUM CHLORIDE: 0.9 INJECTION, SOLUTION INTRAVENOUS at 13:03

## 2019-02-25 RX ADMIN — METOCLOPRAMIDE 10 MG: 5 INJECTION, SOLUTION INTRAMUSCULAR; INTRAVENOUS at 22:20

## 2019-02-25 RX ADMIN — FENTANYL CITRATE 50 MCG: 50 INJECTION, SOLUTION INTRAMUSCULAR; INTRAVENOUS at 13:00

## 2019-02-25 RX ADMIN — MIDAZOLAM 2 MG: 1 INJECTION INTRAMUSCULAR; INTRAVENOUS at 12:26

## 2019-02-25 RX ADMIN — GLYCOPYRROLATE 0.4 MG: 0.2 INJECTION, SOLUTION INTRAMUSCULAR; INTRAVENOUS at 14:39

## 2019-02-25 RX ADMIN — NEOSTIGMINE METHYLSULFATE 3 MG: 1 INJECTION INTRAVENOUS at 14:39

## 2019-02-25 RX ADMIN — FENTANYL CITRATE 100 MCG: 50 INJECTION, SOLUTION INTRAMUSCULAR; INTRAVENOUS at 12:37

## 2019-02-25 RX ADMIN — FENTANYL CITRATE 25 MCG: 50 INJECTION, SOLUTION INTRAMUSCULAR; INTRAVENOUS at 14:40

## 2019-02-25 NOTE — OP NOTE
[facility]   [address], [address]  [phone] (Fax)      Operative Report  LAPAROSCOPIC MARIA ELENA-EN-Y GASTRIC BYPASS AND INTRAOPERATIVE EGD     Patient Name: Marlen Hebert    :  1990  MRN: [id number]  Patient Location: [facility]  Surgery Date : 2019  Surgeons:  Surgeon(s) and Role:     * MD Uzair Villafana 48, CLIFF - Nate    Diagnosis:    Pre-Op Diagnosis Codes: Morbid obesity ([facility] ) [E66 01]  Body mass index is 43 02 kg/m²  Post-Op Diagnosis Codes:     * Morbid obesity ([facility] ) [E66 01]     * Body mass index is 43 02 kg/m²  Procedure  1  Laparoscopic Maria Elena-en-Y Gastric Bypass  2  Intraoperative Endoscopy    Specimen(s):  * No specimens in log *    Estimated Blood Loss:    20 mL * No LDAs found *    Anesthesia Type:     General    Operative Indications: Morbid obesity ([facility] ) [E66 01]  Body mass index is 43 02 kg/m²  Operative Findings:    Normal anatomy    Complications:     None    Procedure and Technique:    INDICATION:    Marlen Hebert is a 33 y o  female with a Body mass index is 43 02 kg/m²  and a long standing history of morbid obesity and inability to lose a significant amount of weight on its own  This patient was found to be a good candidate to undergo a bariatric procedure upon being enrolled here at the [address] [place] [address]  OPERATIVE TECHNIQUE    The patient was taken to the operating room and placed in a supine position  A dose of IV antibiotic prophylaxis that consisted of Ancef 2g and Metronidazole 500mg was given  Also, 5000 units of subcutaneous unfractionated heparin to prevent DVT were administered  Sequential compression devices were placed on both lower extremities      After satisfactory general anesthesia induction and endotracheal intubation was achieved, the extremities were secured to prevent neurovascular and musculoskeletal injuries as best as possible  Subsequently, the abdominal wall was prepped and draped in a surgical standard sterile fashion  After a timeout was done and the patient was properly identified and the type of procedure was confirmed a supra-umbilical transverse skin incision was made, and the subcutaneous tissues dissected  Access to the peritoneal cavity was gained with an optical trocar  With this device, we were able to visualize the layers of the abdominal wall, and enter the peritoneal cavity under direct visualization  Pneumoperitoneum was then established with CO2 insufflation  Under direct laparoscopic visualization, four additional trocars were placed: a 12 mm in the right upper quadrant subcostal position in the anterior axillary line, a 12-mm port was placed in the right flank midclavicular line, a 12-mm port was placed in the left upper quadrant subcostal position in the midclavicular line and another 12-mm port was placed in the left quadrant anterior axillary line lateral to the supraumbilical port  With the trocars in place, the dissection was begun  The omentum of the transverse colon was identified and elevated, this allowed for the ligament of Treitz to be visualized  The small bowel was run about 60 cm to a point distal from the ligament of Treitz and was divided with a stapler and a 60 mm cartridge  The Bisi limb was then measured at 100 cm, and the 100 cm ho was brought in side-to-side opposition to the biliopancreatic limb  A side-to-side jejunojejunostomy was then created  This was accomplished by first making an antimesenteric enterotomy with cautery energy device  We then positioned the laparoscopic stapler with a 60-mm cartridge within the lumen of the bowel to create a stapled side-to-side anastomosis   The enterotomy was then approximated with a 2-0 silk suture, subsequently elevated and the stapler was then reloaded and positioned perpendicularly to the first staple lines, just below the margin of the enterotomy on the antimesenteric border of the bowel and closed transversely utilizing an additional 60-mm cartridge  The resulting mesenteric defect was then closed with a running nonabsorbable suture  A Brolin stitch was placed to prevent kinking  At this point we repositioned the patient into a reverse Trendelenburg and the [facility] liver retractor was placed in the subxiphoid position through the use of a 5-mm trocar incision  We then turned our attention to the gastroesophageal junction  The left rina was skeletonized dissecting at the angle of His  The pars flaccida was identified and incised  The lesser sac was entered below the lesser curve at the level just inferior to the take off of the left gastric artery  The left gastric artery and hepatic vagal branches were preserved  We then created a 30 cc gastric pouch  To accomplish this, serial firings of a laparoscopic stapler 60-mm cartridge were utilized  The staple lines were reinforced with a butressing material  We created a pouch based on the lesser curve and in vertical orientation  This was accomplished by a  transverse firing of the stapler along the inferior edge of the pouch and then vertical serial firings of the stapler to the angle of His  This completely  the pouch from the gastric remnant  A 25 mm circular stapler anvil was passed through the mouth and into the esophagus and subsequently placed within the pouch  The inferior edge of the pouch was then opened with cautery and the anvil stem was brought through the anterior aspect of the pouch close to the staple line  We proceeded to divide the omentum all the way to the transverse colon  The end of the Bisi limb was opened with the cautery and the circular stapling device was brought through the dilated left upper quadrant 12-mm port site, and passed through the open end of the Bisi limb   The Bisi limb was then passed in a antecolic and antegastric position to the pouch  This was accomplished without tension and without twist   The stem of the stapling device was then brought through the antimesenteric border of the Bisi limb adjacent to the pouch  The stem and the anvil were united and the stapler was fired  This created an end-to-side gastrojejunostomy  The excess Bisi limb proximal to the anastomosis was then resected with the cautery energy device and a laparoscopic stapler with a 60-mm cartridge  The anastomosis was reinforced with interrupted absorbable sutures at the intersection of the staple lines  The distal Bisi limb was occluded and an EGD as well as an air insufflation test were performed  No intraoperative bleeding nor leaks were detected  I then covered the G-J anastomosis with a tongue of omentum in a Jaylen patch fashion and secured it in place with a single 2/0 Vicryl stitch  The sponge, needle and instrument count was reported complete  The 12-mm port site on the left flank that was dilated for the circular stapler as well as the Visiport trocar were then closed with the use of a suture closure device and a 0 absorbable suture  The liver retractor was removed under direct laparoscopic visualization, and no bleeding was noted  The remaining ports were then also removed under laparoscopic visualization  The skin incisions were all closed with 4-0 absorbable subcuticular suture  The patient tolerated the procedure well, was extubated uneventfully and was transferred to the recovery room in stable condition  I was present for the entire length of the procedure as the attending of record  No qualified resident was available to assist   The presence of an assistant was necessary for camera holding, traction and counter traction and for help with suturing and stapling in addition to performing the intraop-EGD        Patient Disposition:    PACU     Signature: Amber Mckeon MD  Date: February 25, 2019  Time: 2:39 PM

## 2019-02-25 NOTE — ANESTHESIA POSTPROCEDURE EVALUATION
Post-Op Assessment Note    CV Status:  Stable    Pain management: adequate     Mental Status:  Alert and awake   Hydration Status:  Euvolemic   PONV Controlled:  Controlled   Airway Patency:  Patent   Post Op Vitals Reviewed: Yes      Staff: Anesthesiologist           BP      Temp     Pulse     Resp      SpO2

## 2019-02-25 NOTE — PLAN OF CARE
Problem: PAIN - ADULT  Goal: Verbalizes/displays adequate comfort level or baseline comfort level  Description  Interventions:  - Encourage patient to monitor pain and request assistance  - Assess pain using appropriate pain scale  - Administer analgesics based on type and severity of pain and evaluate response  - Implement non-pharmacological measures as appropriate and evaluate response  - Consider cultural and social influences on pain and pain management  - Notify physician/advanced practitioner if interventions unsuccessful or patient reports new pain  Outcome: Progressing     Problem: INFECTION - ADULT  Goal: Absence or prevention of progression during hospitalization  Description  INTERVENTIONS:  - Assess and monitor for signs and symptoms of infection  - Monitor lab/diagnostic results  - Monitor all insertion sites, i e  indwelling lines, tubes, and drains  - Monitor endotracheal (as able) and nasal secretions for changes in amount and color  - [place] appropriate cooling/warming therapies per order  - Administer medications as ordered  - Instruct and encourage patient and family to use good hand hygiene technique  - Identify and instruct in appropriate isolation precautions for identified infection/condition  Outcome: Progressing     Problem: DISCHARGE PLANNING  Goal: Discharge to home or other facility with appropriate resources  Description  INTERVENTIONS:  - Identify barriers to discharge w/patient and caregiver  - Arrange for needed discharge resources and transportation as appropriate  - Identify discharge learning needs (meds, wound care, etc )  - Arrange for interpretive services to assist at discharge as needed  - Refer to Case Management Department for coordinating discharge planning if the patient needs post-hospital services based on physician/advanced practitioner order or complex needs related to functional status, cognitive ability, or social support system  Outcome: Progressing Problem: SKIN/TISSUE INTEGRITY - ADULT  Goal: Incision(s), wounds(s) or drain site(s) healing without S/S of infection  Description  INTERVENTIONS  - Assess and document risk factors for skin impairment   - Assess and document dressing, incision, wound bed, drain sites and surrounding tissue  - Initiate Nutrition services consult and/or wound management as needed  Outcome: Progressing

## 2019-02-26 VITALS
OXYGEN SATURATION: 96 % | WEIGHT: 262.5 LBS | SYSTOLIC BLOOD PRESSURE: 118 MMHG | DIASTOLIC BLOOD PRESSURE: 57 MMHG | BODY MASS INDEX: 42.19 KG/M2 | HEIGHT: 66 IN | TEMPERATURE: 98.7 F | HEART RATE: 120 BPM | RESPIRATION RATE: 18 BRPM

## 2019-02-26 LAB
ANION GAP SERPL CALCULATED.3IONS-SCNC: 16 MMOL/L (ref 4–13)
BUN SERPL-MCNC: 7 MG/DL (ref 5–25)
CALCIUM SERPL-MCNC: 8.3 MG/DL (ref 8.3–10.1)
CHLORIDE SERPL-SCNC: 105 MMOL/L (ref 100–108)
CO2 SERPL-SCNC: 17 MMOL/L (ref 21–32)
CREAT SERPL-MCNC: 0.73 MG/DL (ref 0.6–1.3)
ERYTHROCYTE [DISTWIDTH] IN BLOOD BY AUTOMATED COUNT: 13.5 % (ref 11.6–15.1)
GFR SERPL CREATININE-BSD FRML MDRD: 113 ML/MIN/1.73SQ M
GLUCOSE SERPL-MCNC: 92 MG/DL (ref 65–140)
GLUCOSE SERPL-MCNC: 95 MG/DL (ref 65–140)
HCT VFR BLD AUTO: 38.3 % (ref 34.8–46.1)
HGB BLD-MCNC: 12.3 G/DL (ref 11.5–15.4)
MCH RBC QN AUTO: 28.1 PG (ref 26.8–34.3)
MCHC RBC AUTO-ENTMCNC: 32.1 G/DL (ref 31.4–37.4)
MCV RBC AUTO: 87 FL (ref 82–98)
PLATELET # BLD AUTO: 304 THOUSANDS/UL (ref 149–390)
PMV BLD AUTO: 9.5 FL (ref 8.9–12.7)
POTASSIUM SERPL-SCNC: 4 MMOL/L (ref 3.5–5.3)
RBC # BLD AUTO: 4.38 MILLION/UL (ref 3.81–5.12)
SODIUM SERPL-SCNC: 138 MMOL/L (ref 136–145)
WBC # BLD AUTO: 10.3 THOUSAND/UL (ref 4.31–10.16)

## 2019-02-26 PROCEDURE — 80048 BASIC METABOLIC PNL TOTAL CA: CPT | Performed by: SURGERY

## 2019-02-26 PROCEDURE — 82948 REAGENT STRIP/BLOOD GLUCOSE: CPT

## 2019-02-26 PROCEDURE — 99024 POSTOP FOLLOW-UP VISIT: CPT | Performed by: SURGERY

## 2019-02-26 PROCEDURE — 85027 COMPLETE CBC AUTOMATED: CPT | Performed by: SURGERY

## 2019-02-26 PROCEDURE — C9113 INJ PANTOPRAZOLE SODIUM, VIA: HCPCS | Performed by: SURGERY

## 2019-02-26 RX ADMIN — ACETAMINOPHEN 325 MG: 160 SUSPENSION ORAL at 07:31

## 2019-02-26 RX ADMIN — PANTOPRAZOLE SODIUM 40 MG: 40 INJECTION, POWDER, FOR SOLUTION INTRAVENOUS at 08:55

## 2019-02-26 RX ADMIN — ONDANSETRON 4 MG: 2 INJECTION INTRAMUSCULAR; INTRAVENOUS at 07:31

## 2019-02-26 RX ADMIN — ONDANSETRON 4 MG: 2 INJECTION INTRAMUSCULAR; INTRAVENOUS at 17:10

## 2019-02-26 RX ADMIN — OXYCODONE HYDROCHLORIDE 10 MG: 5 SOLUTION ORAL at 17:10

## 2019-02-26 RX ADMIN — OXYCODONE HYDROCHLORIDE 10 MG: 5 SOLUTION ORAL at 11:26

## 2019-02-26 RX ADMIN — ACETAMINOPHEN 325 MG: 160 SUSPENSION ORAL at 11:27

## 2019-02-26 RX ADMIN — OXYCODONE HYDROCHLORIDE 5 MG: 5 SOLUTION ORAL at 07:31

## 2019-02-26 RX ADMIN — METOCLOPRAMIDE 10 MG: 5 INJECTION, SOLUTION INTRAMUSCULAR; INTRAVENOUS at 11:23

## 2019-02-26 NOTE — DISCHARGE INSTR - AVS FIRST PAGE
your pain medications from [address] in [facility]IE[facility]   Stay hydrated, drink cups of water every 15 mins  Mild nausea is ok as long as you can drink fluids  Take your omeprazole daily  Crush your pills and open capsules, mix with liquid to drink    Follow up with Dr Jie Roa and your PCP within the next week

## 2019-02-26 NOTE — UTILIZATION REVIEW
Initial Clinical Review    Age/Sex: 33 y o  female     Surgery Date:    2/25/2019      Procedure: Pre-Op Diagnosis Codes: Morbid obesity ([facility] [address] ) [E66 01]  Body mass index is 43 02 kg/m²      Post-Op Diagnosis Codes:     * Morbid obesity ([facility] [address] ) [E66 01]     * Body mass index is 43 02 kg/m²         Procedure  1  Laparoscopic Bisi-en-Y Gastric Bypass  2  Intraoperative Endoscopy          Anesthesia:    general    Admission Orders: Date/Time/Statement: 2/25/19 @ 1443   Orders Placed This Encounter   Procedures    Inpatient Admission     Standing Status:   Standing     Number of Occurrences:   1     Order Specific Question:   Admitting Physician     Answer:   Cierra Ellington     Order Specific Question:   Level of Care     Answer:   Med Surg [16]     Order Specific Question:   Bed Type     Answer:   Bariatric [1]     Order Specific Question:   Estimated length of stay     Answer:   One Midnight     Vital Signs: /57 (BP Location: Left arm)   Pulse (!) 120   Temp 98 7 °F (37 1 °C) (Tympanic)   Resp 18   Ht 5' 5 5" (1 664 m)   Wt 119 kg (262 lb 8 oz)   LMP 02/03/2019   SpO2 96%   BMI 43 02 kg/m²   Diet:        Diet Orders   (From admission, onward)            Start     Ordered    02/25/19 2045  Diet Bariatric; Bariatric Clear Liquid  Diet effective now     Question Answer Comment   Diet Type Bariatric    Bariatric Bariatric Clear Liquid    RD to adjust diet per protocol?  No        02/25/19 1550        Mobility:   As jaime    DVT Prophylaxis:    SCD'S    Pain Control:   Pain Medications             acetaminophen (TYLENOL) 500 mg tablet Take 500 mg by mouth every 6 (six) hours as needed for mild pain    sertraline (ZOLOFT) 100 mg tablet Take 1 5 tablets (150 mg total) by mouth daily      IV  protonix  Daily  IVF  75/hr  IV  reglan Prn (  X 1  2/25  And  X 1  2/26 thus far)  IV  MSo4  PRn ( x2  2/25)  IV  zofran Prn ( x1  2/25 and  x1  2/26 thus far)     POST OP PROGRESS  NOTE    2/26  Patient with morbid obesity s/p LAPAROSCOPIC MARIA ELENA-EN-Y GASTRIC BYPASS AND INTRAOPERATIVE EGD 2/25/2019 with Dr Jake Novak  Tolerating liquid diet without nausea or vomiting, pain adequately controlled on oral pain medication, ambulating without assistance, voiding well, using incentive spirometer  Denies fevers, chills, sweats, SOB, CP, calf pain     1)  Morbid Obesity s/p LAPAROSCOPIC MARIA ELENA-EN-Y GASTRIC BYPASS AND INTRAOPERATIVE EGD 2/25/2019 with stable post op course  Encourage PO fluids, ambulation, and incentive spirometry  If patient continues to tolerate adequate PO fluids will plan for D/C this afternoon      Network Utilization Review Department  Phone: [phone]; Fax [phone]  [email or web address]  ATTENTION: Please call with any questions or concerns to [phone]  and carefully listen to the prompts so that you are directed to the right person  Send all requests for admission clinical reviews, approved or denied determinations and any other requests to fax [phone]   All voicemails are confidential

## 2019-02-26 NOTE — DISCHARGE INSTRUCTIONS

## 2019-02-26 NOTE — DISCHARGE SUMMARY
Discharge Summary - Lindsay Harris 33 y o  female MRN: [id number]    Unit/Bed#: E5 -01 Encounter: [id number]      Pre-Operative Diagnosis: Pre-Op Diagnosis Codes:     * Morbid obesity ([address] ) [E66 01]    Post-Operative Diagnosis: Post-Op Diagnosis Codes:     * Morbid obesity ([facility] [address] ) [E66 01]    Procedures Performed:  Procedure(s):  LAPAROSCOPIC MARIA ELENA-EN-Y GASTRIC BYPASS AND INTRAOPERATIVE EGD    Surgeon: Juan Cowart MD    See H & P for full details of admission and Operative Note for full details of operations performed  Patient tolerated surgery well without complications  In the morning postoperative Day 1, the patient had mild nausea and abdominal pain  Tolerated a clear liquid diet without vomiting  Able to ambulate and voiding independently  Patient was deemed ready for discharge home  Patient was seen and examined prior to discharge  Provisions for Follow-Up Care:  See After Visit Summary/Discharge Instructions for information related to follow-up care and home orders  Disposition: Home, in stable condition  Planned Readmission: No    Discharge Medications:  See After Visit Summary/Discharge Instructions for reconciled discharge medications provided to patient and family  Post Operative instructions: Reviewed with patient and/or family      Signature:   Lizzie Anderson PA-C  Date: 2/26/2019 Time: 4:31 PM

## 2019-02-26 NOTE — PROGRESS NOTES
Progress Note - Bariatric Surgery   Olivia Johnson 33 y o  female MRN: [id number]  Unit/Bed#: E5 -01 Encounter: [id number]      Subjective/Objective     Subjective: Patient with morbid obesity s/p LAPAROSCOPIC MARIA ELENA-EN-Y GASTRIC BYPASS AND INTRAOPERATIVE EGD 2/25/2019 with Dr Korin Olmstead  Tolerating liquid diet without nausea or vomiting, pain adequately controlled on oral pain medication, ambulating without assistance, voiding well, using incentive spirometer  Denies fevers, chills, sweats, SOB, CP, calf pain  Objective:    /57 (BP Location: Left arm)   Pulse (!) 120   Temp 98 7 °F (37 1 °C) (Tympanic)   Resp 18   Ht 5' 5 5" (1 664 m)   Wt 119 kg (262 lb 8 oz)   LMP 02/03/2019   SpO2 96%   BMI 43 02 kg/m²       Intake/Output Summary (Last 24 hours) at 2/26/2019 1103  Last data filed at 2/26/2019 0724  Gross per 24 hour   Intake 3860 42 ml   Output 420 ml   Net 3440 42 ml       Invasive Devices     Peripheral Intravenous Line            Peripheral IV 02/25/19 Right Wrist 1 day                ROS: 10-point system completed  All negative except see HPI  Physical Exam    General Appearance:    Alert, cooperative, no distress, appears stated age   Head:    Normocephalic, without obvious abnormality, atraumatic   Lungs:     Respirations unlabored   Heart:    Regular rate and rhythm   Abdomen:     Soft, appropriate tenderness,  no masses, no organomegaly,   non distended   Extremities:   Extremities normal, atraumatic, no cyanosis or edema   Neurologic:  Incision:  Psych:   Normal strength and sensation    Clean, dry, and intact    Normal mood and affect       Lab, Imaging and other studies:  I have personally reviewed pertinent lab results    , CBC:   Lab Results   Component Value Date    WBC 10 30 (H) 02/26/2019    HGB 12 3 02/26/2019    HCT 38 3 02/26/2019    MCV 87 02/26/2019     02/26/2019    MCH 28 1 02/26/2019    MCHC 32 1 02/26/2019    RDW 13 5 02/26/2019    MPV 9 5 02/26/2019   , CMP: Lab Results   Component Value Date    SODIUM 138 02/26/2019    K 4 0 02/26/2019     02/26/2019    CO2 17 (L) 02/26/2019    BUN 7 02/26/2019    CREATININE 0 73 02/26/2019    CALCIUM 8 3 02/26/2019    EGFR 113 02/26/2019        VTE Mechanical Prophylaxis: sequential compression device    Assessment/Plan  1)  Morbid Obesity s/p LAPAROSCOPIC MARIA ELENA-EN-Y GASTRIC BYPASS AND INTRAOPERATIVE EGD 2/25/2019 with stable post op course  Encourage PO fluids, ambulation, and incentive spirometry  If patient continues to tolerate adequate PO fluids will plan for D/C this afternoon  Plan of care was discussed with patient and patient's nurse  Care plan discussed with Dr Francisco Hardwick: Continue bariatric clear liquid diet, ambulation, incentive spirometry

## 2019-02-26 NOTE — NURSING NOTE
Reviewed discharge instructions with patient including medications and follow-up appointments  IV was removed and patient was walked out with  and a PCA

## 2019-02-27 ENCOUNTER — TRANSITIONAL CARE MANAGEMENT (OUTPATIENT)
Dept: FAMILY MEDICINE CLINIC | Facility: CLINIC | Age: 29
End: 2019-02-27

## 2019-02-27 ENCOUNTER — TELEPHONE (OUTPATIENT)
Dept: BARIATRICS | Facility: CLINIC | Age: 29
End: 2019-02-27

## 2019-02-27 DIAGNOSIS — E66.01 MORBID (SEVERE) OBESITY DUE TO EXCESS CALORIES (HCC): ICD-10-CM

## 2019-02-27 RX ORDER — OMEPRAZOLE 20 MG/1
20 CAPSULE, DELAYED RELEASE ORAL DAILY
Qty: 90 CAPSULE | Refills: 0 | Status: SHIPPED | OUTPATIENT
Start: 2019-02-27 | End: 2019-09-27 | Stop reason: ALTCHOICE

## 2019-02-27 NOTE — UTILIZATION REVIEW
Notification of Discharge  This is a Notification of Discharge from our facility [address]  Please be advised that this patient has been discharge from our facility  Below you will find the admission and discharge date and time including the patients disposition  PRESENTATION DATE: 2/25/2019  9:52 AM  IP ADMISSION DATE: 2/25/19 1443  DISCHARGE DATE: 2/26/2019  5:34 PM  DISPOSITION: [id number] Utilization Review Department  Phone: [phone]; Fax [phone]  [email or web address]  ATTENTION: Please call with any questions or concerns to [phone]  and carefully listen to the prompts so that you are directed to the right person  Send all requests for admission clinical reviews, approved or denied determinations and any other requests to fax [phone]   All voicemails are confidential   Reference #[id number]

## 2019-03-01 RX ORDER — IBUPROFEN 600 MG/1
600 TABLET ORAL EVERY 6 HOURS PRN
COMMUNITY
Start: 2018-03-14 | End: 2019-09-27

## 2019-03-01 RX ORDER — FERROUS SULFATE 325(65) MG
325 TABLET ORAL DAILY
COMMUNITY
Start: 2018-03-14 | End: 2019-09-27

## 2019-03-01 RX ORDER — DOCUSATE SODIUM 100 MG/1
100 CAPSULE, LIQUID FILLED ORAL 2 TIMES DAILY PRN
COMMUNITY
Start: 2018-03-14 | End: 2019-09-27

## 2019-03-01 RX ORDER — ASCORBIC ACID 250 MG
250 TABLET ORAL 2 TIMES DAILY
COMMUNITY
Start: 2018-03-14 | End: 2020-11-27

## 2019-03-04 ENCOUNTER — TELEPHONE (OUTPATIENT)
Dept: BARIATRICS | Facility: CLINIC | Age: 29
End: 2019-03-04

## 2019-03-04 NOTE — TELEPHONE ENCOUNTER
Patient called earlier about when she can have an IUD placed and was misinformed  Called patient back to let her know she needs to wait 4 weeks after surgery for any hormonal birth control  Patient informed me she was having a non-hormomal IUD placed  She is having a erum IUD placed  I told that would be fine  She is having it next week  Called at the advise of the PS Shanta Likes

## 2019-03-04 NOTE — TELEPHONE ENCOUNTER
patient called to question if she can have an IUD placed with OBGYN this week asked if there were any restrictions or needed to wait  Questioned PA she was unsure asked Dr Elissa Dudley she stated it would be fine to have that placed no restrictive wait time

## 2019-03-07 ENCOUNTER — OFFICE VISIT (OUTPATIENT)
Dept: BARIATRICS | Facility: CLINIC | Age: 29
End: 2019-03-07

## 2019-03-07 VITALS
DIASTOLIC BLOOD PRESSURE: 78 MMHG | HEIGHT: 66 IN | HEART RATE: 88 BPM | BODY MASS INDEX: 40.98 KG/M2 | TEMPERATURE: 98.7 F | SYSTOLIC BLOOD PRESSURE: 110 MMHG | WEIGHT: 255 LBS | RESPIRATION RATE: 18 BRPM

## 2019-03-07 DIAGNOSIS — Z98.84 BARIATRIC SURGERY STATUS: Primary | ICD-10-CM

## 2019-03-07 PROCEDURE — RECHECK: Performed by: DIETITIAN, REGISTERED

## 2019-03-07 PROCEDURE — 99024 POSTOP FOLLOW-UP VISIT: CPT | Performed by: SURGERY

## 2019-03-07 RX ORDER — MULTIVITAMIN
1 TABLET ORAL DAILY
COMMUNITY

## 2019-03-07 NOTE — LETTER
March 7, 2019     [facility] [address] [place] [address]    Patient: Sawyer Riley   YOB: 1990   Date of Visit: 3/7/2019       Dear Dr Chaparrita Avendano: Thank you for referring Demetris Carter to me for evaluation  Below are my notes for this consultation  If you have questions, please do not hesitate to call me  I look forward to following your patient along with you  Sincerely,        Oksana Ponce MD        CC: No Recipients  Veronique Aguayo MD  3/7/2019 10:59 AM  Sign at close encounter  Assessment/Plan:    Bariatric surgery status  Patient is here for her 1st postoperative visit after laparoscopic gastric bypass  She is doing very well without nausea or reflux or regurgitation  She is taking her vitamins and antiacid medication  She will see our dietitian today and follow up with us in 4 weeks  She was cleared to go back to work and to exercise without weight restrictions  Diagnoses and all orders for this visit:    Bariatric surgery status    Other orders  -     Multiple Vitamin (MULTIVITAMIN) tablet; Take 1 tablet by mouth daily          Subjective:      Patient ID: Sawyer Riley is a 33 y o  female  HPI    The following portions of the patient's history were reviewed and updated as appropriate: allergies, current medications, past family history, past medical history, past social history, past surgical history and problem list     Review of Systems   Constitutional: Negative  Respiratory: Negative  Cardiovascular: Negative  Gastrointestinal: Negative  Objective:      /78 (BP Location: Left arm, Patient Position: Sitting, Cuff Size: Standard)   Pulse 88   Temp 98 7 °F (37 1 °C) (Tympanic)   Resp 18   Ht 5' 5 5" (1 664 m)   Wt 116 kg (255 lb)   BMI 41 79 kg/m²           Physical Exam   Constitutional: She is oriented to person, place, and time  She appears well-developed and well-nourished  No distress  Pulmonary/Chest: Effort normal    Abdominal: Soft  She exhibits no distension  There is no tenderness  There is no guarding  Abdomen soft nontender nondistended  All incisions well-healed without signs of infection or hernia  Neurological: She is oriented to person, place, and time  Skin: Skin is warm and dry  She is not diaphoretic

## 2019-03-07 NOTE — PROGRESS NOTES
Assessment/Plan:    Bariatric surgery status  Patient is here for her 1st postoperative visit after laparoscopic gastric bypass  She is doing very well without nausea or reflux or regurgitation  She is taking her vitamins and antiacid medication  She will see our dietitian today and follow up with us in 4 weeks  She was cleared to go back to work and to exercise without weight restrictions  Diagnoses and all orders for this visit:    Bariatric surgery status    Other orders  -     Multiple Vitamin (MULTIVITAMIN) tablet; Take 1 tablet by mouth daily          Subjective:      Patient ID: Daljit Lucas is a 33 y o  female  HPI    The following portions of the patient's history were reviewed and updated as appropriate: allergies, current medications, past family history, past medical history, past social history, past surgical history and problem list     Review of Systems   Constitutional: Negative  Respiratory: Negative  Cardiovascular: Negative  Gastrointestinal: Negative  Objective:      /78 (BP Location: Left arm, Patient Position: Sitting, Cuff Size: Standard)   Pulse 88   Temp 98 7 °F (37 1 °C) (Tympanic)   Resp 18   Ht 5' 5 5" (1 664 m)   Wt 116 kg (255 lb)   BMI 41 79 kg/m²          Physical Exam   Constitutional: She is oriented to person, place, and time  She appears well-developed and well-nourished  No distress  Pulmonary/Chest: Effort normal    Abdominal: Soft  She exhibits no distension  There is no tenderness  There is no guarding  Abdomen soft nontender nondistended  All incisions well-healed without signs of infection or hernia  Neurological: She is oriented to person, place, and time  Skin: Skin is warm and dry  She is not diaphoretic

## 2019-03-07 NOTE — ASSESSMENT & PLAN NOTE
Patient is here for her 1st postoperative visit after laparoscopic gastric bypass  She is doing very well without nausea or reflux or regurgitation  She is taking her vitamins and antiacid medication  She will see our dietitian today and follow up with us in 4 weeks  She was cleared to go back to work and to exercise without weight restrictions

## 2019-03-07 NOTE — PROGRESS NOTES
Weight Management Nutrition Class     Diagnosis: Morbid Obesity    Bariatric Surgeon: Dr Garret Ramirez    Surgery: Gastric Bypass Laparoscopic    Class: first post op note    Topics discussed today include:     fluid goals post op, protein goals post op, constipation, chew food well, exercise, diet progression, protein supplems, vitamin/mineral supplements and calcium supplements    Patient was able to verbalize basic diet (protein, fluid, vitamin and mineral) recommendations and possible nutrition-related complications   Yes

## 2019-04-17 ENCOUNTER — CLINICAL SUPPORT (OUTPATIENT)
Dept: BARIATRICS | Facility: CLINIC | Age: 29
End: 2019-04-17

## 2019-04-17 VITALS — BODY MASS INDEX: 38.38 KG/M2 | WEIGHT: 238.8 LBS | HEIGHT: 66 IN

## 2019-04-17 DIAGNOSIS — Z98.84 BARIATRIC SURGERY STATUS: Primary | ICD-10-CM

## 2019-04-17 PROCEDURE — RECHECK: Performed by: DIETITIAN, REGISTERED

## 2019-05-21 ENCOUNTER — TELEPHONE (OUTPATIENT)
Dept: BARIATRICS | Facility: CLINIC | Age: 29
End: 2019-05-21

## 2019-07-05 PROBLEM — Z98.84 BARIATRIC SURGERY STATUS: Chronic | Status: ACTIVE | Noted: 2019-03-07

## 2019-07-05 PROBLEM — Z13.0 SCREENING FOR DEFICIENCY ANEMIA: Status: RESOLVED | Noted: 2018-06-27 | Resolved: 2019-07-05

## 2019-07-05 PROBLEM — E78.2 MIXED HYPERLIPIDEMIA: Status: ACTIVE | Noted: 2019-07-05

## 2019-07-05 PROBLEM — K91.2 POSTSURGICAL MALABSORPTION: Status: ACTIVE | Noted: 2019-07-05

## 2019-07-05 PROBLEM — K91.2 POSTSURGICAL MALABSORPTION: Chronic | Status: ACTIVE | Noted: 2019-07-05

## 2019-07-05 PROBLEM — E55.9 VITAMIN D DEFICIENCY: Status: ACTIVE | Noted: 2019-07-05

## 2019-07-08 ENCOUNTER — TELEPHONE (OUTPATIENT)
Dept: BARIATRICS | Facility: CLINIC | Age: 29
End: 2019-07-08

## 2019-09-27 ENCOUNTER — OFFICE VISIT (OUTPATIENT)
Dept: BARIATRICS | Facility: CLINIC | Age: 29
End: 2019-09-27
Payer: COMMERCIAL

## 2019-09-27 ENCOUNTER — TELEPHONE (OUTPATIENT)
Dept: BARIATRICS | Facility: CLINIC | Age: 29
End: 2019-09-27

## 2019-09-27 VITALS
DIASTOLIC BLOOD PRESSURE: 70 MMHG | HEIGHT: 66 IN | SYSTOLIC BLOOD PRESSURE: 104 MMHG | TEMPERATURE: 98 F | WEIGHT: 194 LBS | BODY MASS INDEX: 31.18 KG/M2 | HEART RATE: 74 BPM | RESPIRATION RATE: 16 BRPM

## 2019-09-27 DIAGNOSIS — Z98.84 BARIATRIC SURGERY STATUS: Primary | Chronic | ICD-10-CM

## 2019-09-27 DIAGNOSIS — E78.2 MIXED HYPERLIPIDEMIA: ICD-10-CM

## 2019-09-27 DIAGNOSIS — E66.9 OBESITY, CLASS I, BMI 30-34.9: ICD-10-CM

## 2019-09-27 DIAGNOSIS — K91.2 POSTSURGICAL MALABSORPTION: Chronic | ICD-10-CM

## 2019-09-27 DIAGNOSIS — E55.9 VITAMIN D DEFICIENCY: ICD-10-CM

## 2019-09-27 PROBLEM — E66.811 OBESITY, CLASS I, BMI 30-34.9: Status: ACTIVE | Noted: 2019-09-27

## 2019-09-27 PROBLEM — F32.A DEPRESSION AFFECTING PREGNANCY: Status: RESOLVED | Noted: 2017-08-03 | Resolved: 2019-09-27

## 2019-09-27 PROBLEM — O34.219 PREVIOUS CESAREAN SECTION COMPLICATING PREGNANCY, ANTEPARTUM CONDITION OR COMPLICATION: Status: RESOLVED | Noted: 2018-01-18 | Resolved: 2019-09-27

## 2019-09-27 PROBLEM — O09.891 MEDICATION EXPOSURE DURING FIRST TRIMESTER OF PREGNANCY: Status: RESOLVED | Noted: 2017-08-03 | Resolved: 2019-09-27

## 2019-09-27 PROBLEM — Z86.59 HISTORY OF POSTPARTUM DEPRESSION: Status: RESOLVED | Noted: 2017-08-03 | Resolved: 2019-09-27

## 2019-09-27 PROBLEM — O09.90 HRP (HIGH RISK PREGNANCY): Status: RESOLVED | Noted: 2017-08-03 | Resolved: 2019-09-27

## 2019-09-27 PROBLEM — O40.9XX0 POLYHYDRAMNIOS, ANTEPARTUM COMPLICATION: Status: RESOLVED | Noted: 2018-02-21 | Resolved: 2019-09-27

## 2019-09-27 PROBLEM — O34.219 MATERNAL CARE FOR SCAR FROM PREVIOUS CESAREAN DELIVERY: Status: RESOLVED | Noted: 2018-03-12 | Resolved: 2019-09-27

## 2019-09-27 PROBLEM — E66.01 MORBID OBESITY (HCC): Status: RESOLVED | Noted: 2017-04-26 | Resolved: 2019-09-27

## 2019-09-27 PROBLEM — Z87.59 HISTORY OF POSTPARTUM DEPRESSION: Status: RESOLVED | Noted: 2017-08-03 | Resolved: 2019-09-27

## 2019-09-27 PROBLEM — O99.340 DEPRESSION AFFECTING PREGNANCY: Status: RESOLVED | Noted: 2017-08-03 | Resolved: 2019-09-27

## 2019-09-27 PROCEDURE — 99214 OFFICE O/P EST MOD 30 MIN: CPT | Performed by: PHYSICIAN ASSISTANT

## 2019-09-27 NOTE — ASSESSMENT & PLAN NOTE
Improved from morbid obesity with bmi of 41 8 at her first post-op visit with her surgeon to current bmi of 31 8 -obesity I  Net EWL is a little above average at 66%

## 2019-09-27 NOTE — PATIENT INSTRUCTIONS
Follow-up in 6 months  We kindly ask that your arrive 15 minutes before your scheduled appointment time with your provider to allow our staff to room you, get your vital signs and update your chart  We thank you for your patience at your visit  Follow diet as discussed  Get lab work done prior to next office visit  It is recommended to check with your insurance BEFORE getting labs done to make sure they are covered by your policy  Make sure to HOLD any multivitamins that may contain biotin and any biotin supplements FOR 5 DAYS before any labs since it can affect the results  Follow vitamin  and mineral recommendations as reviewed with you  Bariatric vitamins are highly recommended  It is important to take vitamins for a life-time  Low levels can lead to deficiencies which can lead to other medical problems    Exercise as tolerated    Call our office if you have any problems with abdominal pain especially associated with fever, chills, nausea, vomiting or any other concerns  All  Post-bariatric surgery patients should be aware that very small quantities of any alcohol  can cause impairment and it is very possible not to feel the effect  The effect can be in the system for several hours  It is also a stomach irritant  It is advised to AVOID alcohol, Nonsteroidal antiinflammatory drugs (NSAIDS) and nicotine of all forms   Any of these can cause stomach irritation/pain

## 2019-09-27 NOTE — ASSESSMENT & PLAN NOTE
She had vitamin D deficiency pre-op but did not take high dose vitamin D supplement-so likely will be quite low-will await repeat labs for advice

## 2019-09-27 NOTE — ASSESSMENT & PLAN NOTE
January/pre-op lipids with high TG/high LDL and low hdl-likely to improve with her weight loss and regular exercise     Will recheck this level for her annual visit if not done already at that point by her PCP or other provider

## 2019-09-27 NOTE — ASSESSMENT & PLAN NOTE
Malabsorption- patient is at risk for malabsorption of vitamins/minerals secondary to malabsorption from her procedure and restriction of intakes  Reviewed current supplements and advised on same    She is taking one procare with 45 mg of iron and  Taking 2-500 mg calcium citrate with D    Will check her vitamin/mineral levels now

## 2019-09-27 NOTE — ASSESSMENT & PLAN NOTE
-s/p Bisi-En-Y Gastric Bypass with Dr Agustina Goodman on 2/24/2019  Overall doing Well  Initial:275 lb  Current:194 lb  EWL:66% which is a little above average for this time frame  [place]: Current  Current BMI is Body mass index is 31 79 kg/m²      Tolerating a regular diet-yes  Eating at least 60 grams of protein per day-yes  Following 30/60 minute rule with liquids-yes  Drinking at least 64 ounces of fluid per day-close--advised on importance of same and ways to boost fluid intake  Drinking carbonated beverages-no  Sufficient exercise-yes  Using NSAIDs regularly-no  Using nicotine-no  Using alcohol-tried a half glass of alcohol-advised on effect after her surgery and encouraged to avoid    Advised we recommend avoidance of pregnancy and use of 2 forms of birth control for up to 2 years post-op if she is sexually active-to have the healthiest pregnancy

## 2019-12-18 DIAGNOSIS — F33.0 MILD EPISODE OF RECURRENT MAJOR DEPRESSIVE DISORDER (HCC): ICD-10-CM

## 2019-12-18 RX ORDER — SERTRALINE HYDROCHLORIDE 100 MG/1
TABLET, FILM COATED ORAL
Qty: 45 TABLET | Refills: 0 | Status: SHIPPED | OUTPATIENT
Start: 2019-12-18 | End: 2020-01-15

## 2020-01-05 LAB
25(OH)D3+25(OH)D2 SERPL-MCNC: 19.6 NG/ML (ref 30–100)
ALBUMIN SERPL-MCNC: 4.4 G/DL (ref 3.5–5.5)
ALBUMIN/GLOB SERPL: 1.9 {RATIO} (ref 1.2–2.2)
ALP SERPL-CCNC: 62 IU/L (ref 39–117)
ALT SERPL-CCNC: 14 IU/L (ref 0–32)
AST SERPL-CCNC: 17 IU/L (ref 0–40)
BILIRUB SERPL-MCNC: 0.2 MG/DL (ref 0–1.2)
BUN SERPL-MCNC: 12 MG/DL (ref 6–20)
BUN/CREAT SERPL: 15 (ref 9–23)
CALCIUM SERPL-MCNC: 9.4 MG/DL (ref 8.7–10.2)
CHLORIDE SERPL-SCNC: 103 MMOL/L (ref 96–106)
CO2 SERPL-SCNC: 22 MMOL/L (ref 20–29)
COPPER SERPL-MCNC: 110 UG/DL (ref 72–166)
CREAT SERPL-MCNC: 0.8 MG/DL (ref 0.57–1)
ERYTHROCYTE [DISTWIDTH] IN BLOOD BY AUTOMATED COUNT: 16 % (ref 12.3–15.4)
FERRITIN SERPL-MCNC: 6 NG/ML (ref 15–150)
FOLATE SERPL-MCNC: 12.6 NG/ML
GLOBULIN SER-MCNC: 2.3 G/DL (ref 1.5–4.5)
GLUCOSE SERPL-MCNC: 84 MG/DL (ref 65–99)
HCT VFR BLD AUTO: 30.4 % (ref 34–46.6)
HGB BLD-MCNC: 9.1 G/DL (ref 11.1–15.9)
MCH RBC QN AUTO: 21.4 PG (ref 26.6–33)
MCHC RBC AUTO-ENTMCNC: 29.9 G/DL (ref 31.5–35.7)
MCV RBC AUTO: 71 FL (ref 79–97)
PLATELET # BLD AUTO: 320 X10E3/UL (ref 150–450)
POTASSIUM SERPL-SCNC: 4.4 MMOL/L (ref 3.5–5.2)
PROT SERPL-MCNC: 6.7 G/DL (ref 6–8.5)
PTH-INTACT SERPL-MCNC: 31 PG/ML (ref 15–65)
RBC # BLD AUTO: 4.26 X10E6/UL (ref 3.77–5.28)
SL AMB EGFR AFRICAN AMERICAN: 116 ML/MIN/1.73
SL AMB EGFR NON AFRICAN AMERICAN: 101 ML/MIN/1.73
SODIUM SERPL-SCNC: 140 MMOL/L (ref 134–144)
VIT A SERPL-MCNC: 37.3 UG/DL (ref 18.9–57.3)
VIT B1 BLD-SCNC: 98 NMOL/L (ref 66.5–200)
VIT B12 SERPL-MCNC: 711 PG/ML (ref 232–1245)
WBC # BLD AUTO: 4.1 X10E3/UL (ref 3.4–10.8)
ZINC SERPL-MCNC: 85 UG/DL (ref 56–134)

## 2020-01-06 DIAGNOSIS — Z98.84 BARIATRIC SURGERY STATUS: ICD-10-CM

## 2020-01-06 DIAGNOSIS — K91.2 POSTSURGICAL MALABSORPTION: ICD-10-CM

## 2020-01-06 DIAGNOSIS — D50.9 IRON DEFICIENCY ANEMIA: Primary | ICD-10-CM

## 2020-01-06 DIAGNOSIS — E55.9 VITAMIN D DEFICIENCY: ICD-10-CM

## 2020-01-06 RX ORDER — ERGOCALCIFEROL 1.25 MG/1
50000 CAPSULE ORAL 2 TIMES WEEKLY
Qty: 8 CAPSULE | Refills: 2 | Status: SHIPPED | OUTPATIENT
Start: 2020-01-06 | End: 2020-11-27

## 2020-01-15 DIAGNOSIS — F33.0 MILD EPISODE OF RECURRENT MAJOR DEPRESSIVE DISORDER (HCC): ICD-10-CM

## 2020-01-15 RX ORDER — SERTRALINE HYDROCHLORIDE 100 MG/1
TABLET, FILM COATED ORAL
Qty: 45 TABLET | Refills: 0 | Status: SHIPPED | OUTPATIENT
Start: 2020-01-15 | End: 2020-03-20 | Stop reason: SDUPTHER

## 2020-02-27 NOTE — PROGRESS NOTES
Bariatric Nutrition Assessment Note    Type of surgery    Preop  Surgery Date: patient does not have a program requirement  Would like surgery in March or April     Surgeon: Dr Minh Alvarez  33 y o   female     Wt with BMI of 25: 151 9#  Pre-Op Excess Wt: 123 1#  Ht 5' 5" (1 651 m)   Wt 125 kg (275 lb)   BMI 45 76 kg/m²     [address] Equation:    DMN:3683 kcal per day   Estimated calorie for weight loss = 1380 to 1880 kcal per day ( 1 to 2 # per week weight loss - sedentary )  Estimated protein needs = 70-82 grams per day ( 1 0-1 2 grams/ kg IBW)    Weight History   Onset of Obesity: Childhood  Family history of obesity: Yes  : mother   Wt Loss Attempts: Commercial Programs (MobileWebsites/FamilyApp, Trampoline, etc )  Counseling with  MD  Exercise  Meal Replacements (Medifast, Slim Fast, etc )  Nutrition Counseling with RD  Self Created Diets (Portion Control, Healthy Food Choices, etc )  Beach Body and phenteramine   Maximum Wt Lost: 30 pounds  190# lowest adult weight     Patient went through the process last year but became pregnant prior to surgery  Review of History and Medications   Past Medical History:   Diagnosis Date    Anemia     Depression     Fatigue     Morbid obesity (HCC)      Past Surgical History:   Procedure Laterality Date     SECTION      DILATION AND CURETTAGE OF UTERUS      OK EGD TRANSORAL BIOPSY SINGLE/MULTIPLE N/A 2017    Procedure: ESOPHAGOGASTRODUODENOSCOPY (EGD) with bx;  Surgeon: Makayla Tay MD;  Location: [facility];   Service: Bariatrics     Social History     Social History    Marital status: Single     Spouse name: N/A    Number of children: N/A    Years of education: N/A     Social History Main Topics    Smoking status: Former Smoker     Packs/day: 1 00     Years:      Quit date: 2015    Smokeless tobacco: Not on file    Alcohol use Yes      Comment: social    Drug use: No    Sexual activity: Charge nurse Gatito Deng made aware of need for telemetry [address] [place] [address] Drive, [facility]  02/27/20 6600 Not on file     Other Topics Concern    Not on file     Social History Narrative    No narrative on file       Current Outpatient Prescriptions:     sertraline (ZOLOFT) 100 mg tablet, TAKE 1 AND 1/2 TABLETS EVERY DAY, Disp: 135 tablet, Rfl: 0     Noted :  Patient is taking her prenatal vitamin/mineral    Food Intake and Lifestyle Assessment   Food Intake Assessment completed via food log brought by patient  16 ounces of coffee - 2 per day   Breakfast: 2 eggs with cheese and toast   OR breakfast sandwich and hash browns   Snack: 0   Lunch: ham and cheese sandwich   Snack: 0  Dinner: chicken fingers , onion rings, gatorade, OR pizza   Snack: 0  Beverage intake: water, coffee/tea and sometimes will drink a gatorade   Protein supplement: none currently   Estimated protein intake per day: 30-40 grams   Estimated fluid intake per day: 64 ounces or more per day   Meals eaten away from home: twice per week   Typical meal pattern: 1 meals per day and 0-1 snacks per day  Eating Behaviors: Consumption of high calorie/ high fat foods, Consumption of high calorie beverages and Large portion sizes, stress eating, emotional eating   Food allergies or intolerances: No Known Allergies  Cultural or Restorationist considerations: n/a    Physical Assessment  Physical Activity  Types of exercise: Walking at work, averages 5000 to 6000 steps per day  Current physical limitations: none     Psychosocial Assessment   Support systems: spouse relative(s)  Socioeconomic factors: patient works second shift, has 2 young boys, youngest is 9 months  Nutrition Diagnosis  Diagnosis: Overweight / Obesity (NC-3 3), Excessive energy intake (NI-1 5), Inadequate protein intake (NI-5 7 3), Undesirable food choices (NB-1 7) and Disordered eating pattern (NB-1 5)  Related to:  Not ready for diet / lifestyle change,  Physical inactivity and Excessive energy intake  As Evidenced by: BMI >25, Unintentional weight gain, Reports of disorded eating patterns and weight gain after last pregnancy 9 months ago      Nutrition Prescription: Recommend the following diet  Regular    Interventions and Teaching   Discussed pre-op and post-op nutrition guidelines  Patient educated and handouts provided  Surgical changes to stomach / GI  Capacity of post-surgery stomach  Diet progression  Adequate hydration  Sugar and fat restriction to decrease "dumping syndrome"  Fat restriction to decrease steatorrhea  Expected weight loss  Weight loss plateaus/ possibility of weight regain  Exercise  Suggestions for pre-op diet  Nutrition considerations after surgery  Protein supplements:  Provided with samples of Unjury protein powder  Meal planning and preparation  Appropriate carbohydrate, protein, and fat intake, and food/fluid choices to maximize safe weight loss, nutrient intake, and tolerance Provided with goals for calories and macros   Dietary and lifestyle changes  Possible problems with poor eating habits  Intuitive eating  Techniques for self monitoring and keeping daily food journal:  Reviewed bariatastic with patient  Potential for food intolerance after surgery, and ways to deal with them including: lactose intolerance, nausea, reflux, vomiting, diarrhea, food intolerance, appetite changes, gas  Vitamin / Mineral supplementation of Multivitamin with minerals, Calcium, Vitamin B12, Iron, Fat Soluble vitamins and Vitamin D: Patient will continue prenatal vitamin/mineral and add 2000 IU of vitamin D3  Emphasized the need to discuss birth control with gyn  Patient was not away that oral contraceptives are not recommended after bypass surgery due to absorption issues  Patient is in the processing of weaning her 10 month old and will discuss with her gyn       Education provided to: patient    Barriers to learning: No barriers identified  Readiness to change: action    Prior research on procedure: internet, discussed with provider, pre-op class, friends or family and support group    Comprehension: verbalizes understanding     Expected Compliance: good  Recommendations  Pt is an appropriate candidate for surgery  Yes      Evaluation / Monitoring  Dietitian to Monitor: Eating pattern as discussed Body weight Lab values Physical activity baritastic food log     Goals  1  Once infant is weaned- food log baritastic 1500 calories, 75 grams of protein(20%), 168 grams of carb(45%), 58 grams of fat(35%) and 20 grams of fiber  2   Decrease coffee intake to 16 ounces per day  3   3 meals per day or 2 meals and a protein supplement     4  Take 2000 IU of vitamin D3 ( in addition to the prenatal vitamin/mineral she is taking )    Time Spent:   39 Minutes

## 2020-03-08 ENCOUNTER — HOSPITAL ENCOUNTER (EMERGENCY)
Facility: HOSPITAL | Age: 30
Discharge: HOME | End: 2020-03-09
Attending: EMERGENCY MEDICINE
Payer: COMMERCIAL

## 2020-03-08 DIAGNOSIS — R55 NEAR SYNCOPE: ICD-10-CM

## 2020-03-08 DIAGNOSIS — N93.9 ABNORMAL UTERINE BLEEDING: Primary | ICD-10-CM

## 2020-03-08 PROCEDURE — 99284 EMERGENCY DEPT VISIT MOD MDM: CPT

## 2020-03-08 PROCEDURE — 99285 EMERGENCY DEPT VISIT HI MDM: CPT | Performed by: EMERGENCY MEDICINE

## 2020-03-09 VITALS
RESPIRATION RATE: 19 BRPM | OXYGEN SATURATION: 100 % | HEART RATE: 93 BPM | DIASTOLIC BLOOD PRESSURE: 69 MMHG | BODY MASS INDEX: 28.93 KG/M2 | HEIGHT: 66 IN | WEIGHT: 180 LBS | SYSTOLIC BLOOD PRESSURE: 125 MMHG | TEMPERATURE: 97.8 F

## 2020-03-09 LAB
ABO GROUP BLD: NORMAL
ALBUMIN SERPL BCP-MCNC: 4.4 G/DL (ref 3.5–5)
ALP SERPL-CCNC: 90 U/L (ref 46–116)
ALT SERPL W P-5'-P-CCNC: 23 U/L (ref 12–78)
ANION GAP SERPL CALCULATED.3IONS-SCNC: 11 MMOL/L (ref 4–13)
APTT PPP: 37 SECONDS (ref 23–37)
AST SERPL W P-5'-P-CCNC: 18 U/L (ref 5–45)
B-HCG SERPL-ACNC: <2 MIU/ML
BASOPHILS # BLD AUTO: 0.03 THOUSANDS/ΜL (ref 0–0.1)
BASOPHILS NFR BLD AUTO: 0 % (ref 0–1)
BILIRUB SERPL-MCNC: 0.2 MG/DL (ref 0.2–1)
BLD GP AB SCN SERPL QL: NEGATIVE
BUN SERPL-MCNC: 23 MG/DL (ref 5–25)
CALCIUM SERPL-MCNC: 9 MG/DL (ref 8.3–10.1)
CHLORIDE SERPL-SCNC: 103 MMOL/L (ref 100–108)
CO2 SERPL-SCNC: 27 MMOL/L (ref 21–32)
CREAT SERPL-MCNC: 0.86 MG/DL (ref 0.6–1.3)
EOSINOPHIL # BLD AUTO: 0.05 THOUSAND/ΜL (ref 0–0.61)
EOSINOPHIL NFR BLD AUTO: 1 % (ref 0–6)
ERYTHROCYTE [DISTWIDTH] IN BLOOD BY AUTOMATED COUNT: 16.4 % (ref 11.6–15.1)
GFR SERPL CREATININE-BSD FRML MDRD: 92 ML/MIN/1.73SQ M
GLUCOSE SERPL-MCNC: 78 MG/DL (ref 65–140)
HCT VFR BLD AUTO: 34.8 % (ref 34.8–46.1)
HGB BLD-MCNC: 10.3 G/DL (ref 11.5–15.4)
IMM GRANULOCYTES # BLD AUTO: 0.02 THOUSAND/UL (ref 0–0.2)
IMM GRANULOCYTES NFR BLD AUTO: 0 % (ref 0–2)
INR PPP: 1.04 (ref 0.84–1.19)
LYMPHOCYTES # BLD AUTO: 2.58 THOUSANDS/ΜL (ref 0.6–4.47)
LYMPHOCYTES NFR BLD AUTO: 36 % (ref 14–44)
MCH RBC QN AUTO: 21.5 PG (ref 26.8–34.3)
MCHC RBC AUTO-ENTMCNC: 29.6 G/DL (ref 31.4–37.4)
MCV RBC AUTO: 73 FL (ref 82–98)
MONOCYTES # BLD AUTO: 0.73 THOUSAND/ΜL (ref 0.17–1.22)
MONOCYTES NFR BLD AUTO: 10 % (ref 4–12)
NEUTROPHILS # BLD AUTO: 3.82 THOUSANDS/ΜL (ref 1.85–7.62)
NEUTS SEG NFR BLD AUTO: 53 % (ref 43–75)
PLATELET # BLD AUTO: 415 THOUSANDS/UL (ref 149–390)
PMV BLD AUTO: 9.7 FL (ref 8.9–12.7)
POTASSIUM SERPL-SCNC: 4.1 MMOL/L (ref 3.5–5.3)
PROT SERPL-MCNC: 8.8 G/DL (ref 6.4–8.2)
PROTHROMBIN TIME: 13.3 SECONDS (ref 11.6–14.5)
RBC # BLD AUTO: 4.8 MILLION/UL (ref 3.81–5.12)
RH BLD: POSITIVE
SODIUM SERPL-SCNC: 141 MMOL/L (ref 136–145)
SPECIMEN EXPIRATION DATE: NORMAL
WBC # BLD AUTO: 7.23 THOUSAND/UL (ref 4.31–10.16)

## 2020-03-09 PROCEDURE — 80053 COMPREHEN METABOLIC PANEL: CPT | Performed by: EMERGENCY MEDICINE

## 2020-03-09 PROCEDURE — 36415 COLL VENOUS BLD VENIPUNCTURE: CPT | Performed by: EMERGENCY MEDICINE

## 2020-03-09 PROCEDURE — 96360 HYDRATION IV INFUSION INIT: CPT

## 2020-03-09 PROCEDURE — 93005 ELECTROCARDIOGRAM TRACING: CPT

## 2020-03-09 PROCEDURE — 84702 CHORIONIC GONADOTROPIN TEST: CPT | Performed by: EMERGENCY MEDICINE

## 2020-03-09 PROCEDURE — 86901 BLOOD TYPING SEROLOGIC RH(D): CPT | Performed by: EMERGENCY MEDICINE

## 2020-03-09 PROCEDURE — 85730 THROMBOPLASTIN TIME PARTIAL: CPT | Performed by: EMERGENCY MEDICINE

## 2020-03-09 PROCEDURE — 85610 PROTHROMBIN TIME: CPT | Performed by: EMERGENCY MEDICINE

## 2020-03-09 PROCEDURE — 85025 COMPLETE CBC W/AUTO DIFF WBC: CPT | Performed by: EMERGENCY MEDICINE

## 2020-03-09 PROCEDURE — 86850 RBC ANTIBODY SCREEN: CPT | Performed by: EMERGENCY MEDICINE

## 2020-03-09 PROCEDURE — 86900 BLOOD TYPING SEROLOGIC ABO: CPT | Performed by: EMERGENCY MEDICINE

## 2020-03-09 RX ADMIN — SODIUM CHLORIDE 1000 ML: 0.9 INJECTION, SOLUTION INTRAVENOUS at 00:20
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
## 2020-03-10 LAB
ATRIAL RATE: 94 BPM
P AXIS: 52 DEGREES
PR INTERVAL: 132 MS
QRS AXIS: 64 DEGREES
QRSD INTERVAL: 86 MS
QT INTERVAL: 330 MS
QTC INTERVAL: 412 MS
T WAVE AXIS: 33 DEGREES
VENTRICULAR RATE: 94 BPM

## 2020-03-10 PROCEDURE — 93010 ELECTROCARDIOGRAM REPORT: CPT | Performed by: INTERNAL MEDICINE

## 2020-03-12 ENCOUNTER — VBI (OUTPATIENT)
Dept: FAMILY MEDICINE CLINIC | Facility: CLINIC | Age: 30
End: 2020-03-12

## 2020-03-12 NOTE — TELEPHONE ENCOUNTER
Lili Echevarria    ED Visit Information     Ed visit date: 3/8/2020  Diagnosis Description: Abnormal uterine bleeding; Near syncope  In Network? Yes [place]  Discharge status: Home  Discharged with meds ? No  Number of ED visits to date: 1  ED Severity:N/A     Outreach Information    Outreach successful: No 1  Date letter mailed:3/16/2020  Date Finalized:3/16/2020    Care Coordination    Follow up appointment with pcp: no (as needed)  Transportation issues ? NA    [place] Outreach    Outreach type:  3 Day Outreach  Emergent necessity warranted by diagnosis:  No  [facility] PCP:  Yes  Transportation:  Self Transport  03/12/2020 02:43 PM Phone ([place]) [facility] (Self) [phone] (H)   Left Message    Unable to reach patient regarding ED visit on 3/8/2020 for Abnormal uterine bleeding; Near syncope  The next outreach attempt will be made on 3/13/2020

## 2020-03-20 ENCOUNTER — TELEMEDICINE (OUTPATIENT)
Dept: FAMILY MEDICINE CLINIC | Facility: CLINIC | Age: 30
End: 2020-03-20
Payer: COMMERCIAL

## 2020-03-20 VITALS — HEIGHT: 66 IN | BODY MASS INDEX: 28.93 KG/M2 | WEIGHT: 180 LBS

## 2020-03-20 DIAGNOSIS — F33.0 MILD EPISODE OF RECURRENT MAJOR DEPRESSIVE DISORDER (HCC): Primary | ICD-10-CM

## 2020-03-20 DIAGNOSIS — F41.9 ANXIETY: ICD-10-CM

## 2020-03-20 PROCEDURE — 1036F TOBACCO NON-USER: CPT | Performed by: FAMILY MEDICINE

## 2020-03-20 PROCEDURE — 99213 OFFICE O/P EST LOW 20 MIN: CPT | Performed by: FAMILY MEDICINE

## 2020-03-20 RX ORDER — SERTRALINE HYDROCHLORIDE 100 MG/1
TABLET, FILM COATED ORAL
Qty: 45 TABLET | Refills: 0 | Status: SHIPPED | OUTPATIENT
Start: 2020-03-20 | End: 2020-04-14
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
## 2020-04-02 PROBLEM — D50.9 IRON DEFICIENCY ANEMIA: Status: ACTIVE | Noted: 2020-04-02

## 2020-04-02 PROBLEM — Z48.815 ENCOUNTER FOR SURGICAL AFTERCARE FOLLOWING SURGERY OF DIGESTIVE SYSTEM: Status: ACTIVE | Noted: 2020-04-02

## 2020-04-02 PROBLEM — E66.3 OVERWEIGHT: Status: ACTIVE | Noted: 2020-04-02

## 2020-04-02 PROBLEM — E66.811 OBESITY, CLASS I, BMI 30-34.9: Status: RESOLVED | Noted: 2019-09-27 | Resolved: 2020-04-02

## 2020-04-02 PROBLEM — E66.9 OBESITY, CLASS I, BMI 30-34.9: Status: RESOLVED | Noted: 2019-09-27 | Resolved: 2020-04-02

## 2020-04-03 ENCOUNTER — TELEMEDICINE (OUTPATIENT)
Dept: BARIATRICS | Facility: CLINIC | Age: 30
End: 2020-04-03
Payer: COMMERCIAL

## 2020-04-03 VITALS — HEIGHT: 66 IN | BODY MASS INDEX: 28.8 KG/M2 | WEIGHT: 179.2 LBS

## 2020-04-03 DIAGNOSIS — E66.3 OVERWEIGHT: Primary | ICD-10-CM

## 2020-04-03 DIAGNOSIS — E55.9 VITAMIN D DEFICIENCY: ICD-10-CM

## 2020-04-03 DIAGNOSIS — D50.8 IRON DEFICIENCY ANEMIA SECONDARY TO INADEQUATE DIETARY IRON INTAKE: ICD-10-CM

## 2020-04-03 DIAGNOSIS — K91.2 POSTSURGICAL MALABSORPTION: Chronic | ICD-10-CM

## 2020-04-03 DIAGNOSIS — F41.9 ANXIETY: ICD-10-CM

## 2020-04-03 DIAGNOSIS — E78.2 MIXED HYPERLIPIDEMIA: ICD-10-CM

## 2020-04-03 DIAGNOSIS — Z98.84 BARIATRIC SURGERY STATUS: Chronic | ICD-10-CM

## 2020-04-03 DIAGNOSIS — Z48.815 ENCOUNTER FOR SURGICAL AFTERCARE FOLLOWING SURGERY OF DIGESTIVE SYSTEM: ICD-10-CM

## 2020-04-03 PROCEDURE — 99443 PR PHYS/QHP TELEPHONE EVALUATION 21-30 MIN: CPT | Performed by: PHYSICIAN ASSISTANT

## 2020-04-13 ENCOUNTER — TELEMEDICINE (OUTPATIENT)
Dept: FAMILY MEDICINE CLINIC | Facility: CLINIC | Age: 30
End: 2020-04-13
Payer: COMMERCIAL

## 2020-04-13 VITALS — TEMPERATURE: 97.6 F | HEIGHT: 66 IN | BODY MASS INDEX: 28.77 KG/M2 | WEIGHT: 179 LBS

## 2020-04-13 DIAGNOSIS — F41.9 ANXIETY: ICD-10-CM

## 2020-04-13 DIAGNOSIS — F33.0 MILD EPISODE OF RECURRENT MAJOR DEPRESSIVE DISORDER (HCC): Primary | ICD-10-CM

## 2020-04-13 PROBLEM — Z01.810 PREOP CARDIOVASCULAR EXAM: Status: RESOLVED | Noted: 2018-06-27 | Resolved: 2020-04-13

## 2020-04-13 PROCEDURE — 1036F TOBACCO NON-USER: CPT | Performed by: FAMILY MEDICINE

## 2020-04-13 PROCEDURE — 99213 OFFICE O/P EST LOW 20 MIN: CPT | Performed by: FAMILY MEDICINE

## 2020-04-13 RX ORDER — TRAZODONE HYDROCHLORIDE 50 MG/1
50 TABLET ORAL
Qty: 30 TABLET | Refills: 2 | Status: SHIPPED | OUTPATIENT
Start: 2020-04-13 | End: 2020-10-21 | Stop reason: ALTCHOICE

## 2020-04-13 RX ORDER — BUSPIRONE HYDROCHLORIDE 10 MG/1
10 TABLET ORAL 3 TIMES DAILY
Qty: 90 TABLET | Refills: 1 | Status: SHIPPED | OUTPATIENT
Start: 2020-04-13 | End: 2020-08-27

## 2020-04-14 DIAGNOSIS — F33.0 MILD EPISODE OF RECURRENT MAJOR DEPRESSIVE DISORDER (HCC): ICD-10-CM

## 2020-04-14 RX ORDER — SERTRALINE HYDROCHLORIDE 100 MG/1
TABLET, FILM COATED ORAL
Qty: 45 TABLET | Refills: 1 | Status: SHIPPED | OUTPATIENT
Start: 2020-04-14 | End: 2020-06-22

## 2020-04-27 ENCOUNTER — TELEMEDICINE (OUTPATIENT)
Dept: FAMILY MEDICINE CLINIC | Facility: CLINIC | Age: 30
End: 2020-04-27
Payer: COMMERCIAL

## 2020-04-27 VITALS — BODY MASS INDEX: 28.77 KG/M2 | HEIGHT: 66 IN | WEIGHT: 179 LBS

## 2020-04-27 DIAGNOSIS — R06.02 SHORTNESS OF BREATH: Primary | ICD-10-CM

## 2020-04-27 DIAGNOSIS — Z20.828 EXPOSURE TO SARS-ASSOCIATED CORONAVIRUS: ICD-10-CM

## 2020-04-27 PROBLEM — Z48.815 ENCOUNTER FOR SURGICAL AFTERCARE FOLLOWING SURGERY OF DIGESTIVE SYSTEM: Status: RESOLVED | Noted: 2020-04-02 | Resolved: 2020-04-27

## 2020-04-27 PROCEDURE — 99213 OFFICE O/P EST LOW 20 MIN: CPT | Performed by: FAMILY MEDICINE

## 2020-04-28 DIAGNOSIS — Z20.828 EXPOSURE TO SARS-ASSOCIATED CORONAVIRUS: ICD-10-CM

## 2020-04-28 PROCEDURE — U0003 INFECTIOUS AGENT DETECTION BY NUCLEIC ACID (DNA OR RNA); SEVERE ACUTE RESPIRATORY SYNDROME CORONAVIRUS 2 (SARS-COV-2) (CORONAVIRUS DISEASE [COVID-19]), AMPLIFIED PROBE TECHNIQUE, MAKING USE OF HIGH THROUGHPUT TECHNOLOGIES AS DESCRIBED BY CMS-2020-01-R: HCPCS

## 2020-04-29 ENCOUNTER — HOSPITAL ENCOUNTER (OUTPATIENT)
Dept: NON INVASIVE DIAGNOSTICS | Facility: HOSPITAL | Age: 30
Discharge: HOME | End: 2020-04-29
Attending: FAMILY MEDICINE
Payer: COMMERCIAL

## 2020-04-29 ENCOUNTER — HOSPITAL ENCOUNTER (OUTPATIENT)
Dept: RADIOLOGY | Facility: HOSPITAL | Age: 30
Discharge: HOME | End: 2020-04-29
Attending: FAMILY MEDICINE
Payer: COMMERCIAL

## 2020-04-29 DIAGNOSIS — R06.02 SHORTNESS OF BREATH: ICD-10-CM

## 2020-04-29 LAB
INPATIENT: NORMAL
SARS-COV-2 RNA SPEC QL NAA+PROBE: NOT DETECTED

## 2020-04-29 PROCEDURE — 71046 X-RAY EXAM CHEST 2 VIEWS: CPT

## 2020-04-29 PROCEDURE — 93005 ELECTROCARDIOGRAM TRACING: CPT

## 2020-04-30 LAB
ATRIAL RATE: 68 BPM
P AXIS: 6 DEGREES
PR INTERVAL: 122 MS
QRS AXIS: 52 DEGREES
QRSD INTERVAL: 88 MS
QT INTERVAL: 394 MS
QTC INTERVAL: 418 MS
T WAVE AXIS: 31 DEGREES
VENTRICULAR RATE: 68 BPM

## 2020-04-30 PROCEDURE — 93010 ELECTROCARDIOGRAM REPORT: CPT | Performed by: INTERNAL MEDICINE

## 2020-06-20 DIAGNOSIS — F33.0 MILD EPISODE OF RECURRENT MAJOR DEPRESSIVE DISORDER (HCC): ICD-10-CM

## 2020-06-22 ENCOUNTER — TELEPHONE (OUTPATIENT)
Dept: FAMILY MEDICINE CLINIC | Facility: CLINIC | Age: 30
End: 2020-06-22

## 2020-06-22 DIAGNOSIS — F33.0 MILD EPISODE OF RECURRENT MAJOR DEPRESSIVE DISORDER (HCC): ICD-10-CM

## 2020-06-22 RX ORDER — SERTRALINE HYDROCHLORIDE 100 MG/1
TABLET, FILM COATED ORAL
Qty: 45 TABLET | Refills: 1 | Status: SHIPPED | OUTPATIENT
Start: 2020-06-22 | End: 2020-06-22 | Stop reason: SDUPTHER

## 2020-06-22 RX ORDER — SERTRALINE HYDROCHLORIDE 100 MG/1
TABLET, FILM COATED ORAL
Qty: 45 TABLET | Refills: 1 | Status: SHIPPED | OUTPATIENT
Start: 2020-06-22 | End: 2020-10-26

## 2020-06-22 NOTE — TELEPHONE ENCOUNTER
Sent new prescription  She should taper off if she wanted to try to stop medication  Please schedule if she wants to discuss

## 2020-06-22 NOTE — TELEPHONE ENCOUNTER
Patient states she has been out of her sertraline for a couple of days and is feeling really poorly  Are you able to send this into the pharmacy today?

## 2020-07-10 ENCOUNTER — TELEMEDICINE (OUTPATIENT)
Dept: FAMILY MEDICINE CLINIC | Facility: CLINIC | Age: 30
End: 2020-07-10
Payer: COMMERCIAL

## 2020-07-10 VITALS — WEIGHT: 179 LBS | TEMPERATURE: 98.9 F | BODY MASS INDEX: 28.77 KG/M2 | HEIGHT: 66 IN

## 2020-07-10 DIAGNOSIS — D50.8 IRON DEFICIENCY ANEMIA SECONDARY TO INADEQUATE DIETARY IRON INTAKE: ICD-10-CM

## 2020-07-10 DIAGNOSIS — N92.0 MENORRHAGIA WITH REGULAR CYCLE: ICD-10-CM

## 2020-07-10 DIAGNOSIS — R53.82 CHRONIC FATIGUE: Primary | ICD-10-CM

## 2020-07-10 PROBLEM — N92.1 METRORRHAGIA: Status: ACTIVE | Noted: 2020-07-10

## 2020-07-10 PROBLEM — Z13.1 SCREENING FOR DIABETES MELLITUS (DM): Status: RESOLVED | Noted: 2018-06-27 | Resolved: 2020-07-10

## 2020-07-10 PROCEDURE — 1036F TOBACCO NON-USER: CPT | Performed by: FAMILY MEDICINE

## 2020-07-10 PROCEDURE — 3008F BODY MASS INDEX DOCD: CPT | Performed by: FAMILY MEDICINE

## 2020-07-10 PROCEDURE — 99213 OFFICE O/P EST LOW 20 MIN: CPT | Performed by: FAMILY MEDICINE
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
## 2020-07-24 LAB
BASOPHILS # BLD AUTO: 0 X10E3/UL (ref 0–0.2)
BASOPHILS NFR BLD AUTO: 0 %
EOSINOPHIL # BLD AUTO: 0 X10E3/UL (ref 0–0.4)
EOSINOPHIL NFR BLD AUTO: 1 %
ERYTHROCYTE [DISTWIDTH] IN BLOOD BY AUTOMATED COUNT: 15.9 % (ref 11.7–15.4)
HCT VFR BLD AUTO: 30.1 % (ref 34–46.6)
HGB BLD-MCNC: 9.5 G/DL (ref 11.1–15.9)
IMM GRANULOCYTES # BLD: 0 X10E3/UL (ref 0–0.1)
IMM GRANULOCYTES NFR BLD: 0 %
IRON SERPL-MCNC: 19 UG/DL (ref 27–159)
LYMPHOCYTES # BLD AUTO: 2 X10E3/UL (ref 0.7–3.1)
LYMPHOCYTES NFR BLD AUTO: 41 %
MCH RBC QN AUTO: 22.9 PG (ref 26.6–33)
MCHC RBC AUTO-ENTMCNC: 31.6 G/DL (ref 31.5–35.7)
MCV RBC AUTO: 73 FL (ref 79–97)
MONOCYTES # BLD AUTO: 0.5 X10E3/UL (ref 0.1–0.9)
MONOCYTES NFR BLD AUTO: 10 %
NEUTROPHILS # BLD AUTO: 2.3 X10E3/UL (ref 1.4–7)
NEUTROPHILS NFR BLD AUTO: 48 %
PLATELET # BLD AUTO: 273 X10E3/UL (ref 150–450)
RBC # BLD AUTO: 4.15 X10E6/UL (ref 3.77–5.28)
TSH SERPL DL<=0.005 MIU/L-ACNC: 1.1 UIU/ML (ref 0.45–4.5)
WBC # BLD AUTO: 4.8 X10E3/UL (ref 3.4–10.8)

## 2020-07-27 ENCOUNTER — TELEPHONE (OUTPATIENT)
Dept: FAMILY MEDICINE CLINIC | Facility: CLINIC | Age: 30
End: 2020-07-27

## 2020-07-27 NOTE — TELEPHONE ENCOUNTER
----- Message from Cesia Woodson DO sent at 7/24/2020  2:06 PM EDT -----  Her red blood count is still very low and her iron level  Her thyroid level is normal   Has she been taking iron daily?

## 2020-08-25 DIAGNOSIS — F41.9 ANXIETY: ICD-10-CM

## 2020-08-27 RX ORDER — BUSPIRONE HYDROCHLORIDE 10 MG/1
TABLET ORAL
Qty: 90 TABLET | Refills: 1 | Status: SHIPPED | OUTPATIENT
Start: 2020-08-27 | End: 2020-10-21 | Stop reason: ALTCHOICE

## 2020-10-03 ENCOUNTER — HOSPITAL ENCOUNTER (EMERGENCY)
Facility: HOSPITAL | Age: 30
Discharge: HOME | End: 2020-10-03
Attending: EMERGENCY MEDICINE
Payer: COMMERCIAL

## 2020-10-03 VITALS
HEIGHT: 66 IN | RESPIRATION RATE: 16 BRPM | WEIGHT: 180 LBS | DIASTOLIC BLOOD PRESSURE: 79 MMHG | SYSTOLIC BLOOD PRESSURE: 126 MMHG | OXYGEN SATURATION: 99 % | BODY MASS INDEX: 28.93 KG/M2 | HEART RATE: 95 BPM | TEMPERATURE: 97.8 F

## 2020-10-03 DIAGNOSIS — N93.9 VAGINAL BLEEDING: Primary | ICD-10-CM

## 2020-10-03 LAB
BASOPHILS # BLD AUTO: 0.02 THOUSANDS/ΜL (ref 0–0.1)
BASOPHILS NFR BLD AUTO: 0 % (ref 0–1)
EOSINOPHIL # BLD AUTO: 0.03 THOUSAND/ΜL (ref 0–0.61)
EOSINOPHIL NFR BLD AUTO: 1 % (ref 0–6)
ERYTHROCYTE [DISTWIDTH] IN BLOOD BY AUTOMATED COUNT: 16.5 % (ref 11.6–15.1)
EXT PREG TEST URINE: NEGATIVE
EXT. CONTROL ED NAV: NORMAL
HCT VFR BLD AUTO: 32.3 % (ref 34.8–46.1)
HGB BLD-MCNC: 9.8 G/DL (ref 11.5–15.4)
IMM GRANULOCYTES # BLD AUTO: 0.01 THOUSAND/UL (ref 0–0.2)
IMM GRANULOCYTES NFR BLD AUTO: 0 % (ref 0–2)
LYMPHOCYTES # BLD AUTO: 1.55 THOUSANDS/ΜL (ref 0.6–4.47)
LYMPHOCYTES NFR BLD AUTO: 31 % (ref 14–44)
MCH RBC QN AUTO: 23.6 PG (ref 26.8–34.3)
MCHC RBC AUTO-ENTMCNC: 30.3 G/DL (ref 31.4–37.4)
MCV RBC AUTO: 78 FL (ref 82–98)
MONOCYTES # BLD AUTO: 0.39 THOUSAND/ΜL (ref 0.17–1.22)
MONOCYTES NFR BLD AUTO: 8 % (ref 4–12)
NEUTROPHILS # BLD AUTO: 2.96 THOUSANDS/ΜL (ref 1.85–7.62)
NEUTS SEG NFR BLD AUTO: 60 % (ref 43–75)
NRBC BLD AUTO-RTO: 0 /100 WBCS
PLATELET # BLD AUTO: 266 THOUSANDS/UL (ref 149–390)
PMV BLD AUTO: 9.8 FL (ref 8.9–12.7)
RBC # BLD AUTO: 4.16 MILLION/UL (ref 3.81–5.12)
WBC # BLD AUTO: 4.96 THOUSAND/UL (ref 4.31–10.16)

## 2020-10-03 PROCEDURE — 81025 URINE PREGNANCY TEST: CPT | Performed by: EMERGENCY MEDICINE

## 2020-10-03 PROCEDURE — 99284 EMERGENCY DEPT VISIT MOD MDM: CPT

## 2020-10-03 PROCEDURE — 36415 COLL VENOUS BLD VENIPUNCTURE: CPT | Performed by: EMERGENCY MEDICINE

## 2020-10-03 PROCEDURE — 99284 EMERGENCY DEPT VISIT MOD MDM: CPT | Performed by: EMERGENCY MEDICINE

## 2020-10-03 PROCEDURE — 85025 COMPLETE CBC W/AUTO DIFF WBC: CPT | Performed by: EMERGENCY MEDICINE

## 2020-10-05 ENCOUNTER — VBI (OUTPATIENT)
Dept: FAMILY MEDICINE CLINIC | Facility: CLINIC | Age: 30
End: 2020-10-05

## 2020-10-21 ENCOUNTER — OFFICE VISIT (OUTPATIENT)
Dept: FAMILY MEDICINE CLINIC | Facility: CLINIC | Age: 30
End: 2020-10-21
Payer: COMMERCIAL

## 2020-10-21 VITALS
DIASTOLIC BLOOD PRESSURE: 72 MMHG | HEIGHT: 66 IN | SYSTOLIC BLOOD PRESSURE: 118 MMHG | WEIGHT: 181 LBS | BODY MASS INDEX: 29.09 KG/M2 | TEMPERATURE: 98.2 F | HEART RATE: 98 BPM | OXYGEN SATURATION: 98 %

## 2020-10-21 DIAGNOSIS — N92.0 MENORRHAGIA WITH REGULAR CYCLE: ICD-10-CM

## 2020-10-21 DIAGNOSIS — D50.8 IRON DEFICIENCY ANEMIA SECONDARY TO INADEQUATE DIETARY IRON INTAKE: Primary | ICD-10-CM

## 2020-10-21 PROCEDURE — 1036F TOBACCO NON-USER: CPT | Performed by: FAMILY MEDICINE

## 2020-10-21 PROCEDURE — 99213 OFFICE O/P EST LOW 20 MIN: CPT | Performed by: FAMILY MEDICINE

## 2020-10-21 PROCEDURE — 3725F SCREEN DEPRESSION PERFORMED: CPT | Performed by: FAMILY MEDICINE

## 2020-10-22 ENCOUNTER — TELEPHONE (OUTPATIENT)
Dept: SURGICAL ONCOLOGY | Facility: CLINIC | Age: 30
End: 2020-10-22

## 2020-10-25 DIAGNOSIS — F33.0 MILD EPISODE OF RECURRENT MAJOR DEPRESSIVE DISORDER (HCC): ICD-10-CM

## 2020-10-26 RX ORDER — SERTRALINE HYDROCHLORIDE 100 MG/1
TABLET, FILM COATED ORAL
Qty: 45 TABLET | Refills: 2 | Status: SHIPPED | OUTPATIENT
Start: 2020-10-26 | End: 2021-03-01

## 2020-11-16 ENCOUNTER — CONSULT (OUTPATIENT)
Dept: HEMATOLOGY ONCOLOGY | Facility: HOSPITAL | Age: 30
End: 2020-11-16
Payer: COMMERCIAL

## 2020-11-16 VITALS
BODY MASS INDEX: 29.89 KG/M2 | DIASTOLIC BLOOD PRESSURE: 74 MMHG | HEART RATE: 75 BPM | RESPIRATION RATE: 16 BRPM | WEIGHT: 186 LBS | TEMPERATURE: 98.9 F | SYSTOLIC BLOOD PRESSURE: 112 MMHG | HEIGHT: 66 IN

## 2020-11-16 DIAGNOSIS — D62 ACUTE BLOOD LOSS ANEMIA: ICD-10-CM

## 2020-11-16 DIAGNOSIS — E53.8 B12 DEFICIENCY: ICD-10-CM

## 2020-11-16 DIAGNOSIS — D50.8 IRON DEFICIENCY ANEMIA SECONDARY TO INADEQUATE DIETARY IRON INTAKE: ICD-10-CM

## 2020-11-16 DIAGNOSIS — D50.8 IRON DEFICIENCY ANEMIA SECONDARY TO INADEQUATE DIETARY IRON INTAKE: Primary | ICD-10-CM

## 2020-11-16 DIAGNOSIS — Z98.84 BARIATRIC SURGERY STATUS: ICD-10-CM

## 2020-11-16 PROCEDURE — 3008F BODY MASS INDEX DOCD: CPT | Performed by: INTERNAL MEDICINE

## 2020-11-16 PROCEDURE — 99245 OFF/OP CONSLTJ NEW/EST HI 55: CPT | Performed by: INTERNAL MEDICINE

## 2020-11-16 RX ORDER — CYANOCOBALAMIN 1000 UG/ML
1000 INJECTION INTRAMUSCULAR; SUBCUTANEOUS ONCE
Status: CANCELLED | OUTPATIENT
Start: 2020-11-24

## 2020-11-16 RX ORDER — SODIUM CHLORIDE 9 MG/ML
20 INJECTION, SOLUTION INTRAVENOUS ONCE
Status: CANCELLED | OUTPATIENT
Start: 2020-11-24

## 2020-11-23 ENCOUNTER — TELEPHONE (OUTPATIENT)
Dept: HEMATOLOGY ONCOLOGY | Facility: CLINIC | Age: 30
End: 2020-11-23

## 2020-11-24 ENCOUNTER — HOSPITAL ENCOUNTER (OUTPATIENT)
Dept: INFUSION CENTER | Facility: HOSPITAL | Age: 30
Discharge: HOME | End: 2020-11-24
Attending: INTERNAL MEDICINE
Payer: COMMERCIAL

## 2020-11-24 VITALS
SYSTOLIC BLOOD PRESSURE: 116 MMHG | OXYGEN SATURATION: 100 % | RESPIRATION RATE: 16 BRPM | DIASTOLIC BLOOD PRESSURE: 62 MMHG | HEART RATE: 80 BPM | TEMPERATURE: 98.2 F

## 2020-11-24 DIAGNOSIS — D62 ACUTE BLOOD LOSS ANEMIA: ICD-10-CM

## 2020-11-24 DIAGNOSIS — E53.8 B12 DEFICIENCY: ICD-10-CM

## 2020-11-24 DIAGNOSIS — D50.8 IRON DEFICIENCY ANEMIA SECONDARY TO INADEQUATE DIETARY IRON INTAKE: Primary | ICD-10-CM

## 2020-11-24 DIAGNOSIS — Z98.84 BARIATRIC SURGERY STATUS: ICD-10-CM

## 2020-11-24 PROCEDURE — 96372 THER/PROPH/DIAG INJ SC/IM: CPT

## 2020-11-24 PROCEDURE — 96365 THER/PROPH/DIAG IV INF INIT: CPT

## 2020-11-24 RX ORDER — SODIUM CHLORIDE 9 MG/ML
20 INJECTION, SOLUTION INTRAVENOUS ONCE
Status: CANCELLED | OUTPATIENT
Start: 2020-11-27

## 2020-11-24 RX ORDER — SODIUM CHLORIDE 9 MG/ML
20 INJECTION, SOLUTION INTRAVENOUS ONCE
Status: COMPLETED | OUTPATIENT
Start: 2020-11-24 | End: 2020-11-24

## 2020-11-24 RX ORDER — CYANOCOBALAMIN 1000 UG/ML
1000 INJECTION INTRAMUSCULAR; SUBCUTANEOUS ONCE
Status: COMPLETED | OUTPATIENT
Start: 2020-11-24 | End: 2020-11-24

## 2020-11-24 RX ORDER — CYANOCOBALAMIN 1000 UG/ML
1000 INJECTION INTRAMUSCULAR; SUBCUTANEOUS ONCE
Status: CANCELLED | OUTPATIENT
Start: 2020-12-01

## 2020-11-24 RX ADMIN — CYANOCOBALAMIN 1000 MCG: 1000 INJECTION, SOLUTION INTRAMUSCULAR at 13:03

## 2020-11-24 RX ADMIN — SODIUM CHLORIDE 20 ML/HR: 9 INJECTION, SOLUTION INTRAVENOUS at 13:02

## 2020-11-24 RX ADMIN — IRON SUCROSE 200 MG: 20 INJECTION, SOLUTION INTRAVENOUS at 13:02

## 2020-11-27 ENCOUNTER — HOSPITAL ENCOUNTER (OUTPATIENT)
Dept: INFUSION CENTER | Facility: HOSPITAL | Age: 30
Discharge: HOME | End: 2020-11-27
Attending: INTERNAL MEDICINE
Payer: COMMERCIAL

## 2020-11-27 VITALS
SYSTOLIC BLOOD PRESSURE: 110 MMHG | TEMPERATURE: 97.8 F | OXYGEN SATURATION: 100 % | RESPIRATION RATE: 16 BRPM | DIASTOLIC BLOOD PRESSURE: 98 MMHG | HEART RATE: 82 BPM

## 2020-11-27 DIAGNOSIS — Z98.84 BARIATRIC SURGERY STATUS: ICD-10-CM

## 2020-11-27 DIAGNOSIS — E53.8 B12 DEFICIENCY: ICD-10-CM

## 2020-11-27 DIAGNOSIS — D62 ACUTE BLOOD LOSS ANEMIA: ICD-10-CM

## 2020-11-27 DIAGNOSIS — D50.8 IRON DEFICIENCY ANEMIA SECONDARY TO INADEQUATE DIETARY IRON INTAKE: Primary | ICD-10-CM

## 2020-11-27 PROCEDURE — 96365 THER/PROPH/DIAG IV INF INIT: CPT

## 2020-11-27 RX ORDER — SODIUM CHLORIDE 9 MG/ML
20 INJECTION, SOLUTION INTRAVENOUS ONCE
Status: CANCELLED | OUTPATIENT
Start: 2020-12-01

## 2020-11-27 RX ORDER — CYANOCOBALAMIN 1000 UG/ML
1000 INJECTION INTRAMUSCULAR; SUBCUTANEOUS ONCE
Status: CANCELLED | OUTPATIENT
Start: 2020-12-01

## 2020-11-27 RX ORDER — SODIUM CHLORIDE 9 MG/ML
20 INJECTION, SOLUTION INTRAVENOUS ONCE
Status: COMPLETED | OUTPATIENT
Start: 2020-11-27 | End: 2020-11-27

## 2020-11-27 RX ADMIN — SODIUM CHLORIDE 20 ML/HR: 9 INJECTION, SOLUTION INTRAVENOUS at 14:56

## 2020-11-27 RX ADMIN — IRON SUCROSE 200 MG: 20 INJECTION, SOLUTION INTRAVENOUS at 14:55

## 2020-12-01 ENCOUNTER — HOSPITAL ENCOUNTER (OUTPATIENT)
Dept: INFUSION CENTER | Facility: HOSPITAL | Age: 30
Discharge: HOME | End: 2020-12-01
Attending: INTERNAL MEDICINE
Payer: COMMERCIAL

## 2020-12-01 VITALS
HEART RATE: 73 BPM | SYSTOLIC BLOOD PRESSURE: 136 MMHG | OXYGEN SATURATION: 100 % | DIASTOLIC BLOOD PRESSURE: 70 MMHG | RESPIRATION RATE: 16 BRPM | TEMPERATURE: 97.4 F

## 2020-12-01 DIAGNOSIS — D50.8 IRON DEFICIENCY ANEMIA SECONDARY TO INADEQUATE DIETARY IRON INTAKE: Primary | ICD-10-CM

## 2020-12-01 DIAGNOSIS — E53.8 B12 DEFICIENCY: ICD-10-CM

## 2020-12-01 DIAGNOSIS — D62 ACUTE BLOOD LOSS ANEMIA: ICD-10-CM

## 2020-12-01 DIAGNOSIS — Z98.84 BARIATRIC SURGERY STATUS: ICD-10-CM

## 2020-12-01 PROCEDURE — 96365 THER/PROPH/DIAG IV INF INIT: CPT

## 2020-12-01 PROCEDURE — 96372 THER/PROPH/DIAG INJ SC/IM: CPT

## 2020-12-01 RX ORDER — SODIUM CHLORIDE 9 MG/ML
20 INJECTION, SOLUTION INTRAVENOUS ONCE
Status: COMPLETED | OUTPATIENT
Start: 2020-12-01 | End: 2020-12-01

## 2020-12-01 RX ORDER — CYANOCOBALAMIN 1000 UG/ML
1000 INJECTION INTRAMUSCULAR; SUBCUTANEOUS ONCE
Status: CANCELLED | OUTPATIENT
Start: 2020-12-08

## 2020-12-01 RX ORDER — CYANOCOBALAMIN 1000 UG/ML
1000 INJECTION INTRAMUSCULAR; SUBCUTANEOUS ONCE
Status: COMPLETED | OUTPATIENT
Start: 2020-12-01 | End: 2020-12-01

## 2020-12-01 RX ORDER — SODIUM CHLORIDE 9 MG/ML
20 INJECTION, SOLUTION INTRAVENOUS ONCE
Status: CANCELLED | OUTPATIENT
Start: 2020-12-04

## 2020-12-01 RX ADMIN — CYANOCOBALAMIN 1000 MCG: 1000 INJECTION, SOLUTION INTRAMUSCULAR at 12:44

## 2020-12-01 RX ADMIN — IRON SUCROSE 200 MG: 20 INJECTION, SOLUTION INTRAVENOUS at 12:43

## 2020-12-01 RX ADMIN — SODIUM CHLORIDE 20 ML/HR: 9 INJECTION, SOLUTION INTRAVENOUS at 12:43

## 2020-12-08 ENCOUNTER — HOSPITAL ENCOUNTER (OUTPATIENT)
Dept: INFUSION CENTER | Facility: HOSPITAL | Age: 30
Discharge: HOME | End: 2020-12-08
Attending: INTERNAL MEDICINE
Payer: COMMERCIAL

## 2020-12-08 VITALS
TEMPERATURE: 97 F | SYSTOLIC BLOOD PRESSURE: 126 MMHG | DIASTOLIC BLOOD PRESSURE: 59 MMHG | OXYGEN SATURATION: 100 % | HEART RATE: 75 BPM | RESPIRATION RATE: 16 BRPM

## 2020-12-08 DIAGNOSIS — D50.8 IRON DEFICIENCY ANEMIA SECONDARY TO INADEQUATE DIETARY IRON INTAKE: ICD-10-CM

## 2020-12-08 DIAGNOSIS — D62 ACUTE BLOOD LOSS ANEMIA: ICD-10-CM

## 2020-12-08 DIAGNOSIS — Z98.84 BARIATRIC SURGERY STATUS: Primary | ICD-10-CM

## 2020-12-08 DIAGNOSIS — E53.8 B12 DEFICIENCY: ICD-10-CM

## 2020-12-08 PROCEDURE — 96372 THER/PROPH/DIAG INJ SC/IM: CPT

## 2020-12-08 PROCEDURE — 96365 THER/PROPH/DIAG IV INF INIT: CPT

## 2020-12-08 RX ORDER — SODIUM CHLORIDE 9 MG/ML
20 INJECTION, SOLUTION INTRAVENOUS ONCE
Status: CANCELLED | OUTPATIENT
Start: 2020-12-10

## 2020-12-08 RX ORDER — CYANOCOBALAMIN 1000 UG/ML
1000 INJECTION INTRAMUSCULAR; SUBCUTANEOUS ONCE
Status: COMPLETED | OUTPATIENT
Start: 2020-12-08 | End: 2020-12-08

## 2020-12-08 RX ORDER — SODIUM CHLORIDE 9 MG/ML
20 INJECTION, SOLUTION INTRAVENOUS ONCE
Status: COMPLETED | OUTPATIENT
Start: 2020-12-08 | End: 2020-12-08

## 2020-12-08 RX ORDER — CYANOCOBALAMIN 1000 UG/ML
1000 INJECTION INTRAMUSCULAR; SUBCUTANEOUS ONCE
Status: CANCELLED | OUTPATIENT
Start: 2020-12-15

## 2020-12-08 RX ADMIN — IRON SUCROSE 200 MG: 20 INJECTION, SOLUTION INTRAVENOUS at 10:20

## 2020-12-08 RX ADMIN — CYANOCOBALAMIN 1000 MCG: 1000 INJECTION, SOLUTION INTRAMUSCULAR at 11:15

## 2020-12-08 RX ADMIN — SODIUM CHLORIDE 20 ML/HR: 9 INJECTION, SOLUTION INTRAVENOUS at 10:19

## 2020-12-10 ENCOUNTER — HOSPITAL ENCOUNTER (OUTPATIENT)
Dept: INFUSION CENTER | Facility: HOSPITAL | Age: 30
Discharge: HOME | End: 2020-12-10
Attending: INTERNAL MEDICINE
Payer: COMMERCIAL

## 2020-12-10 VITALS
DIASTOLIC BLOOD PRESSURE: 58 MMHG | RESPIRATION RATE: 14 BRPM | TEMPERATURE: 97.3 F | HEART RATE: 75 BPM | OXYGEN SATURATION: 100 % | SYSTOLIC BLOOD PRESSURE: 115 MMHG

## 2020-12-10 DIAGNOSIS — D62 ACUTE BLOOD LOSS ANEMIA: ICD-10-CM

## 2020-12-10 DIAGNOSIS — D50.8 IRON DEFICIENCY ANEMIA SECONDARY TO INADEQUATE DIETARY IRON INTAKE: Primary | ICD-10-CM

## 2020-12-10 PROCEDURE — 96365 THER/PROPH/DIAG IV INF INIT: CPT

## 2020-12-10 RX ORDER — SODIUM CHLORIDE 9 MG/ML
20 INJECTION, SOLUTION INTRAVENOUS ONCE
Status: CANCELLED | OUTPATIENT
Start: 2020-12-15

## 2020-12-10 RX ORDER — SODIUM CHLORIDE 9 MG/ML
20 INJECTION, SOLUTION INTRAVENOUS ONCE
Status: COMPLETED | OUTPATIENT
Start: 2020-12-10 | End: 2020-12-10

## 2020-12-10 RX ADMIN — IRON SUCROSE 200 MG: 20 INJECTION, SOLUTION INTRAVENOUS at 10:13

## 2020-12-10 RX ADMIN — SODIUM CHLORIDE 20 ML/HR: 9 INJECTION, SOLUTION INTRAVENOUS at 10:14

## 2020-12-15 ENCOUNTER — HOSPITAL ENCOUNTER (OUTPATIENT)
Dept: INFUSION CENTER | Facility: HOSPITAL | Age: 30
Discharge: HOME | End: 2020-12-15
Attending: INTERNAL MEDICINE
Payer: COMMERCIAL

## 2020-12-15 VITALS
DIASTOLIC BLOOD PRESSURE: 64 MMHG | SYSTOLIC BLOOD PRESSURE: 121 MMHG | RESPIRATION RATE: 16 BRPM | OXYGEN SATURATION: 100 % | HEART RATE: 98 BPM | TEMPERATURE: 96.7 F

## 2020-12-15 DIAGNOSIS — D62 ACUTE BLOOD LOSS ANEMIA: ICD-10-CM

## 2020-12-15 DIAGNOSIS — D50.8 IRON DEFICIENCY ANEMIA SECONDARY TO INADEQUATE DIETARY IRON INTAKE: Primary | ICD-10-CM

## 2020-12-15 PROCEDURE — 96365 THER/PROPH/DIAG IV INF INIT: CPT

## 2020-12-15 RX ORDER — SODIUM CHLORIDE 9 MG/ML
20 INJECTION, SOLUTION INTRAVENOUS ONCE
Status: CANCELLED | OUTPATIENT
Start: 2020-12-17

## 2020-12-15 RX ORDER — SODIUM CHLORIDE 9 MG/ML
20 INJECTION, SOLUTION INTRAVENOUS ONCE
Status: COMPLETED | OUTPATIENT
Start: 2020-12-15 | End: 2020-12-15

## 2020-12-15 RX ADMIN — SODIUM CHLORIDE 20 ML/HR: 9 INJECTION, SOLUTION INTRAVENOUS at 13:00

## 2020-12-15 RX ADMIN — IRON SUCROSE 200 MG: 20 INJECTION, SOLUTION INTRAVENOUS at 13:00

## 2020-12-17 ENCOUNTER — HOSPITAL ENCOUNTER (OUTPATIENT)
Dept: INFUSION CENTER | Facility: HOSPITAL | Age: 30
Discharge: HOME | End: 2020-12-17
Attending: INTERNAL MEDICINE

## 2020-12-24 ENCOUNTER — HOSPITAL ENCOUNTER (OUTPATIENT)
Dept: INFUSION CENTER | Facility: HOSPITAL | Age: 30
Discharge: HOME | End: 2020-12-24
Attending: INTERNAL MEDICINE
Payer: COMMERCIAL

## 2020-12-24 VITALS
HEART RATE: 91 BPM | SYSTOLIC BLOOD PRESSURE: 125 MMHG | DIASTOLIC BLOOD PRESSURE: 58 MMHG | OXYGEN SATURATION: 100 % | TEMPERATURE: 97.9 F | RESPIRATION RATE: 16 BRPM

## 2020-12-24 DIAGNOSIS — E53.8 B12 DEFICIENCY: ICD-10-CM

## 2020-12-24 DIAGNOSIS — D62 ACUTE BLOOD LOSS ANEMIA: ICD-10-CM

## 2020-12-24 DIAGNOSIS — Z98.84 BARIATRIC SURGERY STATUS: ICD-10-CM

## 2020-12-24 DIAGNOSIS — D50.8 IRON DEFICIENCY ANEMIA SECONDARY TO INADEQUATE DIETARY IRON INTAKE: Primary | ICD-10-CM

## 2020-12-24 PROCEDURE — 96372 THER/PROPH/DIAG INJ SC/IM: CPT

## 2020-12-24 PROCEDURE — 96365 THER/PROPH/DIAG IV INF INIT: CPT

## 2020-12-24 RX ORDER — CYANOCOBALAMIN 1000 UG/ML
1000 INJECTION INTRAMUSCULAR; SUBCUTANEOUS ONCE
Status: CANCELLED | OUTPATIENT
Start: 2020-12-29

## 2020-12-24 RX ORDER — SODIUM CHLORIDE 9 MG/ML
20 INJECTION, SOLUTION INTRAVENOUS ONCE
Status: COMPLETED | OUTPATIENT
Start: 2020-12-24 | End: 2020-12-24

## 2020-12-24 RX ORDER — CYANOCOBALAMIN 1000 UG/ML
1000 INJECTION INTRAMUSCULAR; SUBCUTANEOUS ONCE
Status: COMPLETED | OUTPATIENT
Start: 2020-12-24 | End: 2020-12-24

## 2020-12-24 RX ORDER — SODIUM CHLORIDE 9 MG/ML
20 INJECTION, SOLUTION INTRAVENOUS ONCE
Status: CANCELLED | OUTPATIENT
Start: 2020-12-25

## 2020-12-24 RX ADMIN — SODIUM CHLORIDE 20 ML/HR: 9 INJECTION, SOLUTION INTRAVENOUS at 09:55

## 2020-12-24 RX ADMIN — IRON SUCROSE 200 MG: 20 INJECTION, SOLUTION INTRAVENOUS at 09:56

## 2020-12-24 RX ADMIN — CYANOCOBALAMIN 1000 MCG: 1000 INJECTION, SOLUTION INTRAMUSCULAR at 09:58

## 2020-12-29 ENCOUNTER — HOSPITAL ENCOUNTER (OUTPATIENT)
Dept: INFUSION CENTER | Facility: HOSPITAL | Age: 30
Discharge: HOME | End: 2020-12-29
Attending: INTERNAL MEDICINE
Payer: COMMERCIAL

## 2020-12-29 VITALS
SYSTOLIC BLOOD PRESSURE: 134 MMHG | OXYGEN SATURATION: 100 % | TEMPERATURE: 96.5 F | RESPIRATION RATE: 16 BRPM | DIASTOLIC BLOOD PRESSURE: 60 MMHG | HEART RATE: 110 BPM

## 2020-12-29 DIAGNOSIS — Z98.84 BARIATRIC SURGERY STATUS: ICD-10-CM

## 2020-12-29 DIAGNOSIS — D62 ACUTE BLOOD LOSS ANEMIA: ICD-10-CM

## 2020-12-29 DIAGNOSIS — E53.8 B12 DEFICIENCY: ICD-10-CM

## 2020-12-29 DIAGNOSIS — D50.8 IRON DEFICIENCY ANEMIA SECONDARY TO INADEQUATE DIETARY IRON INTAKE: Primary | ICD-10-CM

## 2020-12-29 PROCEDURE — 96372 THER/PROPH/DIAG INJ SC/IM: CPT

## 2020-12-29 PROCEDURE — 96365 THER/PROPH/DIAG IV INF INIT: CPT

## 2020-12-29 RX ORDER — CYANOCOBALAMIN 1000 UG/ML
1000 INJECTION INTRAMUSCULAR; SUBCUTANEOUS ONCE
Status: COMPLETED | OUTPATIENT
Start: 2020-12-29 | End: 2020-12-29

## 2020-12-29 RX ORDER — SODIUM CHLORIDE 9 MG/ML
20 INJECTION, SOLUTION INTRAVENOUS ONCE
Status: COMPLETED | OUTPATIENT
Start: 2020-12-29 | End: 2020-12-29

## 2020-12-29 RX ORDER — CYANOCOBALAMIN 1000 UG/ML
1000 INJECTION INTRAMUSCULAR; SUBCUTANEOUS ONCE
Status: CANCELLED | OUTPATIENT
Start: 2021-01-05

## 2020-12-29 RX ORDER — SODIUM CHLORIDE 9 MG/ML
20 INJECTION, SOLUTION INTRAVENOUS ONCE
Status: CANCELLED | OUTPATIENT
Start: 2020-12-31

## 2020-12-29 RX ADMIN — SODIUM CHLORIDE 20 ML/HR: 9 INJECTION, SOLUTION INTRAVENOUS at 12:59

## 2020-12-29 RX ADMIN — CYANOCOBALAMIN 1000 MCG: 1000 INJECTION, SOLUTION INTRAMUSCULAR at 13:03

## 2020-12-29 RX ADMIN — IRON SUCROSE 200 MG: 20 INJECTION, SOLUTION INTRAVENOUS at 12:59

## 2020-12-29 NOTE — PLAN OF CARE
Problem: PAIN - ADULT  Goal: Verbalizes/displays adequate comfort level or baseline comfort level  Description: Interventions:  - Encourage patient to monitor pain and request assistance  - Assess pain using appropriate pain scale  - Administer analgesics based on type and severity of pain and evaluate response  - Implement non-pharmacological measures as appropriate and evaluate response  - Consider cultural and social influences on pain and pain management  - Notify physician/advanced practitioner if interventions unsuccessful or patient reports new pain  Outcome: Progressing     Problem: INFECTION - ADULT  Goal: Absence or prevention of progression during hospitalization  Description: INTERVENTIONS:  - Assess and monitor for signs and symptoms of infection  - Monitor lab/diagnostic results  - Monitor all insertion sites, i e  indwelling lines, tubes, and drains  - Monitor endotracheal if appropriate and nasal secretions for changes in amount and color  - [place] appropriate cooling/warming therapies per order  - Administer medications as ordered  - Instruct and encourage patient and family to use good hand hygiene technique  - Identify and instruct in appropriate isolation precautions for identified infection/condition  Outcome: Progressing  Goal: Absence of fever/infection during neutropenic period  Description: INTERVENTIONS:  - Monitor WBC    Outcome: Progressing     Problem: SAFETY ADULT  Goal: Patient will remain free of falls  Description: INTERVENTIONS:  - Assess patient frequently for physical needs  -  Identify cognitive and physical deficits and behaviors that affect risk of falls    -  [place] fall precautions as indicated by assessment   - Educate patient/family on patient safety including physical limitations  - Instruct patient to call for assistance with activity based on assessment  - Modify environment to reduce risk of injury  - Consider OT/PT consult to assist with strengthening/mobility  Outcome: Progressing  Goal: Maintain or return to baseline ADL function  Description: INTERVENTIONS:  -  Assess patient's ability to carry out ADLs; assess patient's baseline for ADL function and identify physical deficits which impact ability to perform ADLs (bathing, care of mouth/teeth, toileting, grooming, dressing, etc )  - Assess/evaluate cause of self-care deficits   - Assess range of motion  - Assess patient's mobility; develop plan if impaired  - Assess patient's need for assistive devices and provide as appropriate  - Encourage maximum independence but intervene and supervise when necessary  - Involve family in performance of ADLs  - Assess for home care needs following discharge   - Consider OT consult to assist with ADL evaluation and planning for discharge  - Provide patient education as appropriate  Outcome: Progressing  Goal: Maintain or return mobility status to optimal level  Description: INTERVENTIONS:  - Assess patient's baseline mobility status (ambulation, transfers, stairs, etc )    - Identify cognitive and physical deficits and behaviors that affect mobility  - Identify mobility aids required to assist with transfers and/or ambulation (gait belt, sit-to-stand, lift, walker, cane, etc )  - [place] fall precautions as indicated by assessment  - Record patient progress and toleration of activity level on Mobility SBAR; progress patient to next Phase/Stage  - Instruct patient to call for assistance with activity based on assessment  - Consider rehabilitation consult to assist with strengthening/weightbearing, etc   Outcome: Progressing     Problem: DISCHARGE PLANNING  Goal: Discharge to home or other facility with appropriate resources  Description: INTERVENTIONS:  - Identify barriers to discharge w/patient and caregiver  - Arrange for needed discharge resources and transportation as appropriate  - Identify discharge learning needs (meds, wound care, etc )  - Arrange for interpretive services to assist at discharge as needed  - Refer to Case Management Department for coordinating discharge planning if the patient needs post-hospital services based on physician/advanced practitioner order or complex needs related to functional status, cognitive ability, or social support system  Outcome: Progressing     Problem: Knowledge Deficit  Goal: Patient/family/caregiver demonstrates understanding of disease process, treatment plan, medications, and discharge instructions  Description: Complete learning assessment and assess knowledge base    Interventions:  - Provide teaching at level of understanding  - Provide teaching via preferred learning methods  Outcome: Progressing

## 2020-12-29 NOTE — PROGRESS NOTES
Patient tolerated treatment today with no adverse reactions  Pt educated to notify MD with s/s of reactions including SOB, Rash, and hives Patient gave verbal understanding  Patient then d/cd home ambulatory with steady gait   Patient refused AVS

## 2020-12-31 ENCOUNTER — HOSPITAL ENCOUNTER (OUTPATIENT)
Dept: INFUSION CENTER | Facility: HOSPITAL | Age: 30
Discharge: HOME | End: 2020-12-31
Attending: INTERNAL MEDICINE
Payer: COMMERCIAL

## 2020-12-31 VITALS
TEMPERATURE: 98 F | DIASTOLIC BLOOD PRESSURE: 67 MMHG | HEART RATE: 88 BPM | RESPIRATION RATE: 20 BRPM | SYSTOLIC BLOOD PRESSURE: 138 MMHG

## 2020-12-31 DIAGNOSIS — D50.8 IRON DEFICIENCY ANEMIA SECONDARY TO INADEQUATE DIETARY IRON INTAKE: Primary | ICD-10-CM

## 2020-12-31 DIAGNOSIS — D62 ACUTE BLOOD LOSS ANEMIA: ICD-10-CM

## 2020-12-31 PROCEDURE — 96365 THER/PROPH/DIAG IV INF INIT: CPT

## 2020-12-31 RX ORDER — SODIUM CHLORIDE 9 MG/ML
20 INJECTION, SOLUTION INTRAVENOUS ONCE
Status: CANCELLED | OUTPATIENT
Start: 2021-01-07

## 2020-12-31 RX ORDER — SODIUM CHLORIDE 9 MG/ML
20 INJECTION, SOLUTION INTRAVENOUS ONCE
Status: COMPLETED | OUTPATIENT
Start: 2020-12-31 | End: 2020-12-31

## 2020-12-31 RX ADMIN — IRON SUCROSE 200 MG: 20 INJECTION, SOLUTION INTRAVENOUS at 11:41

## 2020-12-31 RX ADMIN — SODIUM CHLORIDE 20 ML/HR: 9 INJECTION, SOLUTION INTRAVENOUS at 11:51

## 2021-01-07 ENCOUNTER — HOSPITAL ENCOUNTER (OUTPATIENT)
Dept: INFUSION CENTER | Facility: HOSPITAL | Age: 31
Discharge: HOME | End: 2021-01-07
Attending: INTERNAL MEDICINE
Payer: COMMERCIAL

## 2021-01-07 VITALS
HEART RATE: 91 BPM | DIASTOLIC BLOOD PRESSURE: 59 MMHG | SYSTOLIC BLOOD PRESSURE: 122 MMHG | OXYGEN SATURATION: 100 % | TEMPERATURE: 97.8 F | RESPIRATION RATE: 18 BRPM

## 2021-01-07 DIAGNOSIS — E53.8 B12 DEFICIENCY: ICD-10-CM

## 2021-01-07 DIAGNOSIS — D62 ACUTE BLOOD LOSS ANEMIA: ICD-10-CM

## 2021-01-07 DIAGNOSIS — Z98.84 BARIATRIC SURGERY STATUS: Primary | ICD-10-CM

## 2021-01-07 DIAGNOSIS — D50.8 IRON DEFICIENCY ANEMIA SECONDARY TO INADEQUATE DIETARY IRON INTAKE: ICD-10-CM

## 2021-01-07 PROCEDURE — 96365 THER/PROPH/DIAG IV INF INIT: CPT

## 2021-01-07 RX ORDER — SODIUM CHLORIDE 9 MG/ML
20 INJECTION, SOLUTION INTRAVENOUS ONCE
Status: COMPLETED | OUTPATIENT
Start: 2021-01-07 | End: 2021-01-07

## 2021-01-07 RX ORDER — SODIUM CHLORIDE 9 MG/ML
20 INJECTION, SOLUTION INTRAVENOUS ONCE
Status: CANCELLED | OUTPATIENT
Start: 2021-01-08

## 2021-01-07 RX ADMIN — SODIUM CHLORIDE 20 ML/HR: 9 INJECTION, SOLUTION INTRAVENOUS at 11:10

## 2021-01-07 RX ADMIN — IRON SUCROSE 200 MG: 20 INJECTION, SOLUTION INTRAVENOUS at 11:10

## 2021-02-16 ENCOUNTER — TELEPHONE (OUTPATIENT)
Dept: HEMATOLOGY ONCOLOGY | Facility: CLINIC | Age: 31
End: 2021-02-16

## 2021-02-16 NOTE — TELEPHONE ENCOUNTER
Patient had an appointment in May, but says she needs sooner  Changed to Friday the 19th  Patient will have blood work done before appt

## 2021-02-17 ENCOUNTER — TELEPHONE (OUTPATIENT)
Dept: HEMATOLOGY ONCOLOGY | Facility: HOSPITAL | Age: 31
End: 2021-02-17

## 2021-02-18 ENCOUNTER — TELEPHONE (OUTPATIENT)
Dept: HEMATOLOGY ONCOLOGY | Facility: CLINIC | Age: 31
End: 2021-02-18

## 2021-02-18 NOTE — TELEPHONE ENCOUNTER
I called and left a voicemail for the patient regarding tomorrow's appointment  The patient is to have recent blood work done or the appointment will have to be rescheduled

## 2021-02-22 NOTE — TELEPHONE ENCOUNTER
Patient requested lab orders be faxed to [phone] for the upcoming appt with Dr Babatunde Dos Santos, office was notified and will have orders faxed

## 2021-02-24 ENCOUNTER — OFFICE VISIT (OUTPATIENT)
Dept: HEMATOLOGY ONCOLOGY | Facility: HOSPITAL | Age: 31
End: 2021-02-24
Payer: COMMERCIAL

## 2021-02-24 VITALS
WEIGHT: 188 LBS | DIASTOLIC BLOOD PRESSURE: 64 MMHG | RESPIRATION RATE: 16 BRPM | BODY MASS INDEX: 30.34 KG/M2 | SYSTOLIC BLOOD PRESSURE: 102 MMHG | OXYGEN SATURATION: 99 % | TEMPERATURE: 97.8 F | HEART RATE: 89 BPM

## 2021-02-24 DIAGNOSIS — E53.8 B12 DEFICIENCY: Primary | ICD-10-CM

## 2021-02-24 DIAGNOSIS — D50.8 IRON DEFICIENCY ANEMIA SECONDARY TO INADEQUATE DIETARY IRON INTAKE: ICD-10-CM

## 2021-02-24 DIAGNOSIS — Z98.84 BARIATRIC SURGERY STATUS: ICD-10-CM

## 2021-02-24 DIAGNOSIS — D62 ACUTE BLOOD LOSS ANEMIA: ICD-10-CM

## 2021-02-24 PROCEDURE — 99214 OFFICE O/P EST MOD 30 MIN: CPT | Performed by: INTERNAL MEDICINE

## 2021-02-24 RX ORDER — SODIUM CHLORIDE 9 MG/ML
20 INJECTION, SOLUTION INTRAVENOUS ONCE
Status: CANCELLED | OUTPATIENT
Start: 2021-03-05

## 2021-02-24 RX ORDER — CYANOCOBALAMIN 1000 UG/ML
1000 INJECTION INTRAMUSCULAR; SUBCUTANEOUS ONCE
Status: CANCELLED | OUTPATIENT
Start: 2021-03-05

## 2021-02-24 RX ORDER — ETONOGESTREL AND ETHINYL ESTRADIOL 11.7; 2.7 MG/1; MG/1
INSERT, EXTENDED RELEASE VAGINAL
COMMUNITY
Start: 2020-11-17 | End: 2021-09-09 | Stop reason: ALTCHOICE
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
## 2021-02-25 LAB
ALBUMIN SERPL-MCNC: 4.8 G/DL (ref 3.9–5)
ALBUMIN/GLOB SERPL: 2 {RATIO} (ref 1.2–2.2)
ALP SERPL-CCNC: 72 IU/L (ref 39–117)
ALT SERPL-CCNC: 18 IU/L (ref 0–32)
AST SERPL-CCNC: 16 IU/L (ref 0–40)
BASOPHILS # BLD AUTO: 0 X10E3/UL (ref 0–0.2)
BASOPHILS NFR BLD AUTO: 1 %
BILIRUB SERPL-MCNC: <0.2 MG/DL (ref 0–1.2)
BUN SERPL-MCNC: 14 MG/DL (ref 6–20)
BUN/CREAT SERPL: 19 (ref 9–23)
CALCIUM SERPL-MCNC: 9.6 MG/DL (ref 8.7–10.2)
CHLORIDE SERPL-SCNC: 103 MMOL/L (ref 96–106)
CO2 SERPL-SCNC: 25 MMOL/L (ref 20–29)
CREAT SERPL-MCNC: 0.74 MG/DL (ref 0.57–1)
EOSINOPHIL # BLD AUTO: 0 X10E3/UL (ref 0–0.4)
EOSINOPHIL NFR BLD AUTO: 1 %
ERYTHROCYTE [DISTWIDTH] IN BLOOD BY AUTOMATED COUNT: 14.6 % (ref 11.7–15.4)
FERRITIN SERPL-MCNC: 79 NG/ML (ref 15–150)
GLOBULIN SER-MCNC: 2.4 G/DL (ref 1.5–4.5)
GLUCOSE SERPL-MCNC: 98 MG/DL (ref 65–99)
HCT VFR BLD AUTO: 37.8 % (ref 34–46.6)
HGB BLD-MCNC: 12.6 G/DL (ref 11.1–15.9)
IMM GRANULOCYTES # BLD: 0 X10E3/UL (ref 0–0.1)
IMM GRANULOCYTES NFR BLD: 0 %
IRON SATN MFR SERPL: 16 % (ref 15–55)
IRON SERPL-MCNC: 48 UG/DL (ref 27–159)
LYMPHOCYTES # BLD AUTO: 1.7 X10E3/UL (ref 0.7–3.1)
LYMPHOCYTES NFR BLD AUTO: 34 %
MCH RBC QN AUTO: 29.3 PG (ref 26.6–33)
MCHC RBC AUTO-ENTMCNC: 33.3 G/DL (ref 31.5–35.7)
MCV RBC AUTO: 88 FL (ref 79–97)
METHYLMALONATE SERPL-SCNC: 101 NMOL/L (ref 0–378)
MONOCYTES # BLD AUTO: 0.4 X10E3/UL (ref 0.1–0.9)
MONOCYTES NFR BLD AUTO: 7 %
NEUTROPHILS # BLD AUTO: 2.9 X10E3/UL (ref 1.4–7)
NEUTROPHILS NFR BLD AUTO: 57 %
PLATELET # BLD AUTO: 268 X10E3/UL (ref 150–450)
POTASSIUM SERPL-SCNC: 5 MMOL/L (ref 3.5–5.2)
PROT SERPL-MCNC: 7.2 G/DL (ref 6–8.5)
RBC # BLD AUTO: 4.3 X10E6/UL (ref 3.77–5.28)
SL AMB DISCLAIMER: NORMAL
SL AMB EGFR AFRICAN AMERICAN: 127 ML/MIN/1.73
SL AMB EGFR NON AFRICAN AMERICAN: 110 ML/MIN/1.73
SODIUM SERPL-SCNC: 140 MMOL/L (ref 134–144)
TIBC SERPL-MCNC: 300 UG/DL (ref 250–450)
UIBC SERPL-MCNC: 252 UG/DL (ref 131–425)
WBC # BLD AUTO: 5 X10E3/UL (ref 3.4–10.8)

## 2021-03-01 DIAGNOSIS — F33.0 MILD EPISODE OF RECURRENT MAJOR DEPRESSIVE DISORDER (HCC): ICD-10-CM

## 2021-03-01 RX ORDER — SERTRALINE HYDROCHLORIDE 100 MG/1
TABLET, FILM COATED ORAL
Qty: 45 TABLET | Refills: 2 | Status: SHIPPED | OUTPATIENT
Start: 2021-03-01 | End: 2021-05-19

## 2021-03-04 ENCOUNTER — HOSPITAL ENCOUNTER (OUTPATIENT)
Dept: INFUSION CENTER | Facility: HOSPITAL | Age: 31
End: 2021-03-04
Attending: INTERNAL MEDICINE

## 2021-03-05 ENCOUNTER — HOSPITAL ENCOUNTER (OUTPATIENT)
Dept: INFUSION CENTER | Facility: HOSPITAL | Age: 31
Discharge: HOME | End: 2021-03-05
Attending: INTERNAL MEDICINE
Payer: COMMERCIAL

## 2021-03-05 VITALS
DIASTOLIC BLOOD PRESSURE: 58 MMHG | RESPIRATION RATE: 16 BRPM | SYSTOLIC BLOOD PRESSURE: 125 MMHG | OXYGEN SATURATION: 100 % | HEART RATE: 77 BPM | TEMPERATURE: 97.3 F

## 2021-03-05 DIAGNOSIS — E53.8 B12 DEFICIENCY: ICD-10-CM

## 2021-03-05 DIAGNOSIS — D62 ACUTE BLOOD LOSS ANEMIA: ICD-10-CM

## 2021-03-05 DIAGNOSIS — D50.8 IRON DEFICIENCY ANEMIA SECONDARY TO INADEQUATE DIETARY IRON INTAKE: Primary | ICD-10-CM

## 2021-03-05 DIAGNOSIS — Z98.84 BARIATRIC SURGERY STATUS: ICD-10-CM

## 2021-03-05 PROCEDURE — 96365 THER/PROPH/DIAG IV INF INIT: CPT

## 2021-03-05 PROCEDURE — 96372 THER/PROPH/DIAG INJ SC/IM: CPT

## 2021-03-05 RX ORDER — CYANOCOBALAMIN 1000 UG/ML
1000 INJECTION INTRAMUSCULAR; SUBCUTANEOUS ONCE
Status: CANCELLED | OUTPATIENT
Start: 2021-04-02

## 2021-03-05 RX ORDER — CYANOCOBALAMIN 1000 UG/ML
1000 INJECTION INTRAMUSCULAR; SUBCUTANEOUS ONCE
Status: COMPLETED | OUTPATIENT
Start: 2021-03-05 | End: 2021-03-05

## 2021-03-05 RX ORDER — SODIUM CHLORIDE 9 MG/ML
20 INJECTION, SOLUTION INTRAVENOUS ONCE
Status: COMPLETED | OUTPATIENT
Start: 2021-03-05 | End: 2021-03-05

## 2021-03-05 RX ORDER — SODIUM CHLORIDE 9 MG/ML
20 INJECTION, SOLUTION INTRAVENOUS ONCE
Status: CANCELLED | OUTPATIENT
Start: 2021-04-01

## 2021-03-05 RX ADMIN — SODIUM CHLORIDE 20 ML/HR: 9 INJECTION, SOLUTION INTRAVENOUS at 12:27

## 2021-03-05 RX ADMIN — CYANOCOBALAMIN 1000 MCG: 1000 INJECTION, SOLUTION INTRAMUSCULAR at 12:28

## 2021-03-05 RX ADMIN — IRON SUCROSE 200 MG: 20 INJECTION, SOLUTION INTRAVENOUS at 12:27

## 2021-04-01 ENCOUNTER — HOSPITAL ENCOUNTER (OUTPATIENT)
Dept: INFUSION CENTER | Facility: HOSPITAL | Age: 31
Discharge: HOME | End: 2021-04-01
Attending: INTERNAL MEDICINE
Payer: COMMERCIAL

## 2021-04-01 VITALS
DIASTOLIC BLOOD PRESSURE: 63 MMHG | TEMPERATURE: 97.1 F | SYSTOLIC BLOOD PRESSURE: 134 MMHG | HEART RATE: 105 BPM | OXYGEN SATURATION: 99 %

## 2021-04-01 DIAGNOSIS — D50.8 IRON DEFICIENCY ANEMIA SECONDARY TO INADEQUATE DIETARY IRON INTAKE: ICD-10-CM

## 2021-04-01 DIAGNOSIS — Z98.84 BARIATRIC SURGERY STATUS: ICD-10-CM

## 2021-04-01 DIAGNOSIS — D62 ACUTE BLOOD LOSS ANEMIA: ICD-10-CM

## 2021-04-01 DIAGNOSIS — E53.8 B12 DEFICIENCY: Primary | ICD-10-CM

## 2021-04-01 PROCEDURE — 96365 THER/PROPH/DIAG IV INF INIT: CPT

## 2021-04-01 PROCEDURE — 96366 THER/PROPH/DIAG IV INF ADDON: CPT

## 2021-04-01 PROCEDURE — 96372 THER/PROPH/DIAG INJ SC/IM: CPT

## 2021-04-01 RX ORDER — SODIUM CHLORIDE 9 MG/ML
20 INJECTION, SOLUTION INTRAVENOUS ONCE
Status: CANCELLED | OUTPATIENT
Start: 2021-04-29

## 2021-04-01 RX ORDER — CYANOCOBALAMIN 1000 UG/ML
1000 INJECTION INTRAMUSCULAR; SUBCUTANEOUS ONCE
Status: COMPLETED | OUTPATIENT
Start: 2021-04-01 | End: 2021-04-01

## 2021-04-01 RX ORDER — SODIUM CHLORIDE 9 MG/ML
20 INJECTION, SOLUTION INTRAVENOUS ONCE
Status: COMPLETED | OUTPATIENT
Start: 2021-04-01 | End: 2021-04-01

## 2021-04-01 RX ORDER — CYANOCOBALAMIN 1000 UG/ML
1000 INJECTION INTRAMUSCULAR; SUBCUTANEOUS ONCE
Status: CANCELLED | OUTPATIENT
Start: 2021-04-29

## 2021-04-01 RX ADMIN — CYANOCOBALAMIN 1000 MCG: 1000 INJECTION, SOLUTION INTRAMUSCULAR at 11:17

## 2021-04-01 RX ADMIN — SODIUM CHLORIDE 20 ML/HR: 9 INJECTION, SOLUTION INTRAVENOUS at 11:16

## 2021-04-01 RX ADMIN — IRON SUCROSE 200 MG: 20 INJECTION, SOLUTION INTRAVENOUS at 11:16

## 2021-04-01 NOTE — PLAN OF CARE
Problem: Knowledge Deficit  Goal: Patient/family/caregiver demonstrates understanding of disease process, treatment plan, medications, and discharge instructions  Description: Complete learning assessment and assess knowledge base    Interventions:  - Provide teaching at level of understanding  - Provide teaching via preferred learning methods  4/1/2021 1310 by Julio Ba RN  Outcome: Progressing  4/1/2021 1133 by Julio Ba RN  Outcome: Progressing

## 2021-04-01 NOTE — PROGRESS NOTES
Pt tolerated IV Venofer infusion without adverse reaction  Pt left unit ambulatory with steady gait   Pt refused AVS

## 2021-04-29 ENCOUNTER — HOSPITAL ENCOUNTER (OUTPATIENT)
Dept: INFUSION CENTER | Facility: HOSPITAL | Age: 31
Discharge: HOME | End: 2021-04-29
Attending: INTERNAL MEDICINE
Payer: COMMERCIAL

## 2021-04-29 VITALS
RESPIRATION RATE: 18 BRPM | SYSTOLIC BLOOD PRESSURE: 122 MMHG | HEART RATE: 72 BPM | TEMPERATURE: 97.5 F | DIASTOLIC BLOOD PRESSURE: 63 MMHG | OXYGEN SATURATION: 100 %

## 2021-04-29 DIAGNOSIS — D50.8 IRON DEFICIENCY ANEMIA SECONDARY TO INADEQUATE DIETARY IRON INTAKE: Primary | ICD-10-CM

## 2021-04-29 DIAGNOSIS — Z98.84 BARIATRIC SURGERY STATUS: ICD-10-CM

## 2021-04-29 DIAGNOSIS — E53.8 B12 DEFICIENCY: ICD-10-CM

## 2021-04-29 DIAGNOSIS — D62 ACUTE BLOOD LOSS ANEMIA: ICD-10-CM

## 2021-04-29 PROCEDURE — 96372 THER/PROPH/DIAG INJ SC/IM: CPT

## 2021-04-29 PROCEDURE — 96365 THER/PROPH/DIAG IV INF INIT: CPT

## 2021-04-29 RX ORDER — CYANOCOBALAMIN 1000 UG/ML
1000 INJECTION INTRAMUSCULAR; SUBCUTANEOUS ONCE
Status: CANCELLED | OUTPATIENT
Start: 2021-05-27

## 2021-04-29 RX ORDER — SODIUM CHLORIDE 9 MG/ML
20 INJECTION, SOLUTION INTRAVENOUS ONCE
Status: CANCELLED | OUTPATIENT
Start: 2021-05-27

## 2021-04-29 RX ORDER — CYANOCOBALAMIN 1000 UG/ML
1000 INJECTION INTRAMUSCULAR; SUBCUTANEOUS ONCE
Status: COMPLETED | OUTPATIENT
Start: 2021-04-29 | End: 2021-04-29

## 2021-04-29 RX ORDER — SODIUM CHLORIDE 9 MG/ML
20 INJECTION, SOLUTION INTRAVENOUS ONCE
Status: COMPLETED | OUTPATIENT
Start: 2021-04-29 | End: 2021-04-29

## 2021-04-29 RX ADMIN — SODIUM CHLORIDE 20 ML/HR: 9 INJECTION, SOLUTION INTRAVENOUS at 11:36

## 2021-04-29 RX ADMIN — CYANOCOBALAMIN 1000 MCG: 1000 INJECTION, SOLUTION INTRAMUSCULAR at 11:42

## 2021-04-29 RX ADMIN — IRON SUCROSE 200 MG: 20 INJECTION, SOLUTION INTRAVENOUS at 11:36

## 2021-05-19 DIAGNOSIS — F33.0 MILD EPISODE OF RECURRENT MAJOR DEPRESSIVE DISORDER (HCC): ICD-10-CM

## 2021-05-19 RX ORDER — SERTRALINE HYDROCHLORIDE 100 MG/1
TABLET, FILM COATED ORAL
Qty: 45 TABLET | Refills: 2 | Status: SHIPPED | OUTPATIENT
Start: 2021-05-19 | End: 2021-11-04

## 2021-05-27 ENCOUNTER — HOSPITAL ENCOUNTER (OUTPATIENT)
Dept: INFUSION CENTER | Facility: HOSPITAL | Age: 31
Discharge: HOME | End: 2021-05-27
Attending: INTERNAL MEDICINE
Payer: COMMERCIAL

## 2021-05-27 VITALS
HEART RATE: 82 BPM | DIASTOLIC BLOOD PRESSURE: 59 MMHG | OXYGEN SATURATION: 100 % | SYSTOLIC BLOOD PRESSURE: 118 MMHG | RESPIRATION RATE: 16 BRPM | TEMPERATURE: 97.2 F

## 2021-05-27 DIAGNOSIS — E53.8 B12 DEFICIENCY: ICD-10-CM

## 2021-05-27 DIAGNOSIS — Z98.84 BARIATRIC SURGERY STATUS: ICD-10-CM

## 2021-05-27 DIAGNOSIS — D62 ACUTE BLOOD LOSS ANEMIA: ICD-10-CM

## 2021-05-27 DIAGNOSIS — D50.8 IRON DEFICIENCY ANEMIA SECONDARY TO INADEQUATE DIETARY IRON INTAKE: Primary | ICD-10-CM

## 2021-05-27 PROCEDURE — 96365 THER/PROPH/DIAG IV INF INIT: CPT

## 2021-05-27 PROCEDURE — 96372 THER/PROPH/DIAG INJ SC/IM: CPT

## 2021-05-27 RX ORDER — CYANOCOBALAMIN 1000 UG/ML
1000 INJECTION INTRAMUSCULAR; SUBCUTANEOUS ONCE
Status: COMPLETED | OUTPATIENT
Start: 2021-05-27 | End: 2021-05-27

## 2021-05-27 RX ORDER — CYANOCOBALAMIN 1000 UG/ML
1000 INJECTION INTRAMUSCULAR; SUBCUTANEOUS ONCE
Status: CANCELLED | OUTPATIENT
Start: 2021-06-24

## 2021-05-27 RX ORDER — SODIUM CHLORIDE 9 MG/ML
20 INJECTION, SOLUTION INTRAVENOUS ONCE
Status: CANCELLED | OUTPATIENT
Start: 2021-06-24

## 2021-05-27 RX ORDER — SODIUM CHLORIDE 9 MG/ML
20 INJECTION, SOLUTION INTRAVENOUS ONCE
Status: COMPLETED | OUTPATIENT
Start: 2021-05-27 | End: 2021-05-27

## 2021-05-27 RX ADMIN — IRON SUCROSE 200 MG: 20 INJECTION, SOLUTION INTRAVENOUS at 11:53

## 2021-05-27 RX ADMIN — SODIUM CHLORIDE 20 ML/HR: 9 INJECTION, SOLUTION INTRAVENOUS at 11:53

## 2021-05-27 RX ADMIN — CYANOCOBALAMIN 1000 MCG: 1000 INJECTION, SOLUTION INTRAMUSCULAR at 11:56

## 2021-05-27 NOTE — PROGRESS NOTES
Patient completed chemotherapy infusion and B12 injection with no issues  Refused AVS, left unit ambulatory with steady gait

## 2021-07-15 ENCOUNTER — TELEPHONE (OUTPATIENT)
Dept: BARIATRICS | Facility: CLINIC | Age: 31
End: 2021-07-15

## 2021-07-15 NOTE — TELEPHONE ENCOUNTER
----- Message from Ren Kruse RN sent at 2021 10:45 AM EDT -----  Regardin year f/u appointment  Please contact patient to schedule a 2 year follow up appointment    Thank You

## 2021-08-26 ENCOUNTER — TELEPHONE (OUTPATIENT)
Dept: HEMATOLOGY ONCOLOGY | Facility: CLINIC | Age: 31
End: 2021-08-26

## 2021-08-26 NOTE — TELEPHONE ENCOUNTER
The patient will need labs prior to her visit with Dr Osei Hernandez or we will have to reschedule her appointment

## 2021-08-27 ENCOUNTER — TELEPHONE (OUTPATIENT)
Dept: HEMATOLOGY ONCOLOGY | Facility: HOSPITAL | Age: 31
End: 2021-08-27

## 2021-09-09 ENCOUNTER — OFFICE VISIT (OUTPATIENT)
Dept: BARIATRICS | Facility: CLINIC | Age: 31
End: 2021-09-09
Payer: COMMERCIAL

## 2021-09-09 VITALS
HEIGHT: 66 IN | HEART RATE: 77 BPM | TEMPERATURE: 97 F | DIASTOLIC BLOOD PRESSURE: 70 MMHG | WEIGHT: 188.5 LBS | BODY MASS INDEX: 30.29 KG/M2 | SYSTOLIC BLOOD PRESSURE: 120 MMHG

## 2021-09-09 DIAGNOSIS — Z98.84 BARIATRIC SURGERY STATUS: Primary | ICD-10-CM

## 2021-09-09 PROCEDURE — 1036F TOBACCO NON-USER: CPT | Performed by: SURGERY

## 2021-09-09 PROCEDURE — 99212 OFFICE O/P EST SF 10 MIN: CPT | Performed by: SURGERY

## 2021-09-09 PROCEDURE — 3008F BODY MASS INDEX DOCD: CPT | Performed by: SURGERY
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
## 2021-09-29 ENCOUNTER — APPOINTMENT (OUTPATIENT)
Dept: LAB | Facility: HOSPITAL | Age: 31
End: 2021-09-29
Attending: INTERNAL MEDICINE
Payer: COMMERCIAL

## 2021-09-29 DIAGNOSIS — Z98.84 BARIATRIC SURGERY STATUS: ICD-10-CM

## 2021-09-29 DIAGNOSIS — E53.8 B12 DEFICIENCY: ICD-10-CM

## 2021-09-29 DIAGNOSIS — D50.8 IRON DEFICIENCY ANEMIA SECONDARY TO INADEQUATE DIETARY IRON INTAKE: ICD-10-CM

## 2021-09-29 DIAGNOSIS — D62 ACUTE BLOOD LOSS ANEMIA: ICD-10-CM

## 2021-09-29 LAB
25(OH)D3 SERPL-MCNC: 24.9 NG/ML (ref 30–100)
ALBUMIN SERPL BCP-MCNC: 3.9 G/DL (ref 3.5–5)
ALP SERPL-CCNC: 65 U/L (ref 46–116)
ALT SERPL W P-5'-P-CCNC: 23 U/L (ref 12–78)
ANION GAP SERPL CALCULATED.3IONS-SCNC: 4 MMOL/L (ref 4–13)
AST SERPL W P-5'-P-CCNC: 14 U/L (ref 5–45)
BASOPHILS # BLD AUTO: 0.03 THOUSANDS/ΜL (ref 0–0.1)
BASOPHILS NFR BLD AUTO: 1 % (ref 0–1)
BILIRUB SERPL-MCNC: 0.4 MG/DL (ref 0.2–1)
BUN SERPL-MCNC: 14 MG/DL (ref 5–25)
CALCIUM SERPL-MCNC: 8.3 MG/DL (ref 8.3–10.1)
CHLORIDE SERPL-SCNC: 105 MMOL/L (ref 100–108)
CO2 SERPL-SCNC: 30 MMOL/L (ref 21–32)
CREAT SERPL-MCNC: 0.73 MG/DL (ref 0.6–1.3)
EOSINOPHIL # BLD AUTO: 0.06 THOUSAND/ΜL (ref 0–0.61)
EOSINOPHIL NFR BLD AUTO: 2 % (ref 0–6)
ERYTHROCYTE [DISTWIDTH] IN BLOOD BY AUTOMATED COUNT: 12.1 % (ref 11.6–15.1)
FERRITIN SERPL-MCNC: 19 NG/ML (ref 8–388)
FOLATE SERPL-MCNC: 18.6 NG/ML (ref 3.1–17.5)
GFR SERPL CREATININE-BSD FRML MDRD: 112 ML/MIN/1.73SQ M
GLUCOSE P FAST SERPL-MCNC: 84 MG/DL (ref 65–99)
HCT VFR BLD AUTO: 41 % (ref 34.8–46.1)
HGB BLD-MCNC: 13.4 G/DL (ref 11.5–15.4)
IMM GRANULOCYTES # BLD AUTO: 0.02 THOUSAND/UL (ref 0–0.2)
IMM GRANULOCYTES NFR BLD AUTO: 1 % (ref 0–2)
IRON SATN MFR SERPL: 20 % (ref 15–50)
IRON SERPL-MCNC: 70 UG/DL (ref 50–170)
LYMPHOCYTES # BLD AUTO: 1.09 THOUSANDS/ΜL (ref 0.6–4.47)
LYMPHOCYTES NFR BLD AUTO: 27 % (ref 14–44)
MCH RBC QN AUTO: 30.2 PG (ref 26.8–34.3)
MCHC RBC AUTO-ENTMCNC: 32.7 G/DL (ref 31.4–37.4)
MCV RBC AUTO: 92 FL (ref 82–98)
MONOCYTES # BLD AUTO: 0.34 THOUSAND/ΜL (ref 0.17–1.22)
MONOCYTES NFR BLD AUTO: 8 % (ref 4–12)
NEUTROPHILS # BLD AUTO: 2.49 THOUSANDS/ΜL (ref 1.85–7.62)
NEUTS SEG NFR BLD AUTO: 61 % (ref 43–75)
NRBC BLD AUTO-RTO: 0 /100 WBCS
PLATELET # BLD AUTO: 241 THOUSANDS/UL (ref 149–390)
PMV BLD AUTO: 9.8 FL (ref 8.9–12.7)
POTASSIUM SERPL-SCNC: 3.7 MMOL/L (ref 3.5–5.3)
PROT SERPL-MCNC: 7.2 G/DL (ref 6.4–8.2)
PTH-INTACT SERPL-MCNC: 69.9 PG/ML (ref 18.4–80.1)
RBC # BLD AUTO: 4.44 MILLION/UL (ref 3.81–5.12)
SODIUM SERPL-SCNC: 139 MMOL/L (ref 136–145)
TIBC SERPL-MCNC: 342 UG/DL (ref 250–450)
VIT B12 SERPL-MCNC: 386 PG/ML (ref 100–900)
WBC # BLD AUTO: 4.03 THOUSAND/UL (ref 4.31–10.16)

## 2021-09-29 PROCEDURE — 36415 COLL VENOUS BLD VENIPUNCTURE: CPT

## 2021-09-29 PROCEDURE — 82728 ASSAY OF FERRITIN: CPT

## 2021-09-29 PROCEDURE — 82746 ASSAY OF FOLIC ACID SERUM: CPT

## 2021-09-29 PROCEDURE — 83970 ASSAY OF PARATHORMONE: CPT

## 2021-09-29 PROCEDURE — 83550 IRON BINDING TEST: CPT

## 2021-09-29 PROCEDURE — 83540 ASSAY OF IRON: CPT

## 2021-09-29 PROCEDURE — 84590 ASSAY OF VITAMIN A: CPT

## 2021-09-29 PROCEDURE — 80053 COMPREHEN METABOLIC PANEL: CPT

## 2021-09-29 PROCEDURE — 85025 COMPLETE CBC W/AUTO DIFF WBC: CPT

## 2021-09-29 PROCEDURE — 82607 VITAMIN B-12: CPT

## 2021-09-29 PROCEDURE — 82306 VITAMIN D 25 HYDROXY: CPT

## 2021-09-29 PROCEDURE — 83918 ORGANIC ACIDS TOTAL QUANT: CPT

## 2021-09-29 PROCEDURE — 84425 ASSAY OF VITAMIN B-1: CPT

## 2021-09-29 PROCEDURE — 84630 ASSAY OF ZINC: CPT

## 2021-10-03 LAB
METHYLMALONATE SERPL-SCNC: 138 NMOL/L (ref 0–378)
SL AMB DISCLAIMER: NORMAL
VIT B1 BLD-SCNC: 106.4 NMOL/L (ref 66.5–200)

## 2021-10-05 LAB — ZINC SERPL-MCNC: 76 UG/DL (ref 44–115)

## 2021-10-06 LAB — VIT A SERPL-MCNC: 44 UG/DL (ref 18.9–57.3)

## 2021-10-11 NOTE — PROGRESS NOTES
Assessment/Plan:    Bariatric surgery status  -s/p Bisi-En-Y Gastric Bypass with Dr Balwinder Faustin on 2/24/2019  Overall doing Well  Initial:275 lb  Current:194 lb  EWL:66% which is a little above average for this time frame  [place]: Current  Current BMI is Body mass index is 31 79 kg/m²  Tolerating a regular diet-yes  Eating at least 60 grams of protein per day-yes  Following 30/60 minute rule with liquids-yes  Drinking at least 64 ounces of fluid per day-close--advised on importance of same and ways to boost fluid intake  Drinking carbonated beverages-no  Sufficient exercise-yes  Using NSAIDs regularly-no  Using nicotine-no  Using alcohol-tried a half glass of alcohol-advised on effect after her surgery and encouraged to avoid    Advised we recommend avoidance of pregnancy and use of 2 forms of birth control for up to 2 years post-op if she is sexually active-to have the healthiest pregnancy    Postsurgical malabsorption  Malabsorption- patient is at risk for malabsorption of vitamins/minerals secondary to malabsorption from her procedure and restriction of intakes  Reviewed current supplements and advised on same    She is taking one procare with 45 mg of iron and  Taking 2-500 mg calcium citrate with D    Will check her vitamin/mineral levels now    Vitamin D deficiency  She had vitamin D deficiency pre-op but did not take high dose vitamin D supplement-so likely will be quite low-will await repeat labs for advice  Mixed hyperlipidemia  January/pre-op lipids with high TG/high LDL and low hdl-likely to improve with her weight loss and regular exercise     Will recheck this level for her annual visit if not done already at that point by her PCP or other provider    Obesity, Class I, BMI 30-34 9  Improved from morbid obesity with bmi of 41 8 at her first post-op visit with her surgeon to current bmi of 31 8 -obesity I  Net EWL is a little above average at 66%       Diagnoses and all orders for this visit:    Bariatric surgery status  -     CBC and Platelet; Future  -     Comprehensive metabolic panel; Future  -     Copper Level; Future  -     Ferritin; Future  -     Folate; Future  -     Iron Saturation %; Future  -     PTH, intact; Future  -     Vitamin A; Future  -     Vitamin B1, whole blood; Future  -     Vitamin B12; Future  -     Vitamin D 25 hydroxy; Future  -     Zinc; Future  -     CBC and Platelet  -     Comprehensive metabolic panel  -     Copper Level  -     Ferritin  -     Folate  -     PTH, intact  -     Vitamin A  -     Vitamin B1, whole blood  -     Vitamin B12  -     Vitamin D 25 hydroxy  -     Zinc    Postsurgical malabsorption  -     CBC and Platelet; Future  -     Comprehensive metabolic panel; Future  -     Copper Level; Future  -     Ferritin; Future  -     Folate; Future  -     Iron Saturation %; Future  -     PTH, intact; Future  -     Vitamin A; Future  -     Vitamin B1, whole blood; Future  -     Vitamin B12; Future  -     Vitamin D 25 hydroxy; Future  -     Zinc; Future  -     CBC and Platelet  -     Comprehensive metabolic panel  -     Copper Level  -     Ferritin  -     Folate  -     PTH, intact  -     Vitamin A  -     Vitamin B1, whole blood  -     Vitamin B12  -     Vitamin D 25 hydroxy  -     Zinc    Obesity, Class I, BMI 30-34 9  -     CBC and Platelet; Future  -     Comprehensive metabolic panel; Future  -     Copper Level; Future  -     Ferritin; Future  -     Folate; Future  -     Iron Saturation %; Future  -     PTH, intact; Future  -     Vitamin A; Future  -     Vitamin B1, whole blood; Future  -     Vitamin B12; Future  -     Vitamin D 25 hydroxy;  Future  -     Zinc; Future  -     CBC and Platelet  -     Comprehensive metabolic panel  -     Copper Level  -     Ferritin  -     Folate  -     PTH, intact  -     Vitamin A  -     Vitamin B1, whole blood  -     Vitamin B12  -     Vitamin D 25 hydroxy  -     Zinc    Vitamin D deficiency    Mixed hyperlipidemia Subjective:      Patient ID: Mitchell Umaña is a 33 y o  female  She is here in routine follow-up  She is tolerating a regular diet  She is exercising with a combination of cardio and weights  She feels good and is taking vitamins  She has no complaints today      The following portions of the patient's history were reviewed and updated as appropriate: allergies, current medications, past family history, past medical history, past social history, past surgical history and problem list     Review of Systems   Constitutional: Negative for chills and fever  Unexpected weight change: planned weight loss  Respiratory: Negative for shortness of breath and wheezing  Cardiovascular: Negative for chest pain and palpitations  Gastrointestinal: Negative for abdominal pain, constipation, diarrhea, nausea and vomiting  Psychiatric/Behavioral: Suicidal ideas: no complait of anxiety or depression  Objective:      /70 (BP Location: Left arm, Patient Position: Sitting, Cuff Size: Large)   Pulse 74   Temp 98 °F (36 7 °C) (Tympanic)   Resp 16   Ht 5' 5 5" (1 664 m)   Wt 88 kg (194 lb)   BMI 31 79 kg/m²          Physical Exam   Constitutional: She is oriented to person, place, and time  She appears well-developed and well-nourished  HENT:   Mouth/Throat: Oropharynx is clear and moist    Eyes: Conjunctivae are normal  No scleral icterus  Cardiovascular: Normal rate and regular rhythm  Pulmonary/Chest: Effort normal and breath sounds normal    Abdominal: Soft  There is no tenderness  No incisional hernias appreciated   Musculoskeletal:   Normal gait   Neurological: She is alert and oriented to person, place, and time  Psychiatric: She has a normal mood and affect  Her behavior is normal  Judgment and thought content normal    Nursing note and vitals reviewed  GOALS: Continued weight loss with good nutrition intakes    Normal vitamin and mineral levels  Exercise as tolerated    BARRIERS: none identified Unknown

## 2021-11-04 ENCOUNTER — TELEMEDICINE (OUTPATIENT)
Dept: FAMILY MEDICINE CLINIC | Facility: CLINIC | Age: 31
End: 2021-11-04
Payer: COMMERCIAL

## 2021-11-04 ENCOUNTER — OFFICE VISIT (OUTPATIENT)
Dept: BARIATRICS | Facility: CLINIC | Age: 31
End: 2021-11-04

## 2021-11-04 VITALS — WEIGHT: 187 LBS | BODY MASS INDEX: 30.65 KG/M2

## 2021-11-04 VITALS — WEIGHT: 187 LBS | HEIGHT: 66 IN | BODY MASS INDEX: 30.05 KG/M2

## 2021-11-04 DIAGNOSIS — F33.0 MILD EPISODE OF RECURRENT MAJOR DEPRESSIVE DISORDER (HCC): Primary | ICD-10-CM

## 2021-11-04 DIAGNOSIS — K91.2 POSTSURGICAL MALABSORPTION: Primary | Chronic | ICD-10-CM

## 2021-11-04 DIAGNOSIS — K90.9 INTESTINAL MALABSORPTION, UNSPECIFIED TYPE: ICD-10-CM

## 2021-11-04 DIAGNOSIS — E66.9 OBESITY, CLASS I, BMI 30-34.9: ICD-10-CM

## 2021-11-04 DIAGNOSIS — Z98.84 BARIATRIC SURGERY STATUS: ICD-10-CM

## 2021-11-04 DIAGNOSIS — Z98.84 BARIATRIC SURGERY STATUS: Chronic | ICD-10-CM

## 2021-11-04 PROCEDURE — 99214 OFFICE O/P EST MOD 30 MIN: CPT | Performed by: PHYSICIAN ASSISTANT

## 2021-11-04 PROCEDURE — 3008F BODY MASS INDEX DOCD: CPT | Performed by: PHYSICIAN ASSISTANT

## 2021-11-04 PROCEDURE — 3725F SCREEN DEPRESSION PERFORMED: CPT | Performed by: PHYSICIAN ASSISTANT

## 2021-11-04 PROCEDURE — RECHECK: Performed by: DIETITIAN, REGISTERED

## 2021-11-04 PROCEDURE — 1036F TOBACCO NON-USER: CPT | Performed by: PHYSICIAN ASSISTANT

## 2021-11-04 RX ORDER — VENLAFAXINE HYDROCHLORIDE 37.5 MG/1
CAPSULE, EXTENDED RELEASE ORAL
Qty: 50 CAPSULE | Refills: 5 | Status: SHIPPED | OUTPATIENT
Start: 2021-11-04 | End: 2021-11-12 | Stop reason: SDUPTHER

## 2021-11-05 ENCOUNTER — TELEPHONE (OUTPATIENT)
Dept: ADMINISTRATIVE | Facility: OTHER | Age: 31
End: 2021-11-05

## 2021-11-12 ENCOUNTER — TELEPHONE (OUTPATIENT)
Dept: FAMILY MEDICINE CLINIC | Facility: CLINIC | Age: 31
End: 2021-11-12

## 2021-11-12 DIAGNOSIS — F33.0 MILD EPISODE OF RECURRENT MAJOR DEPRESSIVE DISORDER (HCC): ICD-10-CM

## 2021-11-12 RX ORDER — VENLAFAXINE HYDROCHLORIDE 37.5 MG/1
CAPSULE, EXTENDED RELEASE ORAL
Qty: 14 CAPSULE | Refills: 0 | Status: SHIPPED | OUTPATIENT
Start: 2021-11-12 | End: 2022-02-09

## 2021-12-08 ENCOUNTER — OFFICE VISIT (OUTPATIENT)
Dept: BARIATRICS | Facility: CLINIC | Age: 31
End: 2021-12-08

## 2021-12-08 DIAGNOSIS — K91.2 POSTSURGICAL MALABSORPTION: Primary | Chronic | ICD-10-CM

## 2021-12-08 DIAGNOSIS — Z98.84 BARIATRIC SURGERY STATUS: Chronic | ICD-10-CM

## 2021-12-08 PROCEDURE — RECHECK: Performed by: DIETITIAN, REGISTERED

## 2022-02-09 ENCOUNTER — OFFICE VISIT (OUTPATIENT)
Dept: BARIATRICS | Facility: CLINIC | Age: 32
End: 2022-02-09

## 2022-02-09 ENCOUNTER — TELEMEDICINE (OUTPATIENT)
Dept: FAMILY MEDICINE CLINIC | Facility: CLINIC | Age: 32
End: 2022-02-09
Payer: COMMERCIAL

## 2022-02-09 VITALS — BODY MASS INDEX: 29.66 KG/M2 | WEIGHT: 181 LBS

## 2022-02-09 DIAGNOSIS — Z98.84 BARIATRIC SURGERY STATUS: Chronic | ICD-10-CM

## 2022-02-09 DIAGNOSIS — K91.2 POSTSURGICAL MALABSORPTION: Primary | Chronic | ICD-10-CM

## 2022-02-09 DIAGNOSIS — F33.0 MILD EPISODE OF RECURRENT MAJOR DEPRESSIVE DISORDER (HCC): ICD-10-CM

## 2022-02-09 PROCEDURE — 1036F TOBACCO NON-USER: CPT | Performed by: PHYSICIAN ASSISTANT

## 2022-02-09 PROCEDURE — RECHECK: Performed by: DIETITIAN, REGISTERED

## 2022-02-09 PROCEDURE — 99213 OFFICE O/P EST LOW 20 MIN: CPT | Performed by: PHYSICIAN ASSISTANT

## 2022-02-09 RX ORDER — VENLAFAXINE HYDROCHLORIDE 150 MG/1
CAPSULE, EXTENDED RELEASE ORAL
Qty: 30 CAPSULE | Refills: 3 | Status: SHIPPED | OUTPATIENT
Start: 2022-02-09 | End: 2022-03-12

## 2022-02-09 NOTE — PROGRESS NOTES
Virtual Regular Visit    Verification of patient location:    Patient is located in the following state in which I hold an active license [place]      Assessment/Plan:    Mild episode of recurrent major depression-  Still out of control,  Increase Effexor to 150 mg  Daily  Monitor side effects, and   Blood pressure -  Follow up in 4 weeks for progress  Reason for visit is   Chief Complaint   Patient presents with    Follow-up     medication check up    Virtual Regular Visit        Encounter provider Tez Parnell PA-C    Provider located at [place] [id number]  [address] [facility] [place] [address]      Recent Visits  No visits were found meeting these conditions  Showing recent visits within past 7 days and meeting all other requirements  Today's Visits  Date Type Provider Dept   02/09/22 Telemedicine Tez Parnell PA-C [facility]   Showing today's visits and meeting all other requirements  Future Appointments  No visits were found meeting these conditions  Showing future appointments within next 150 days and meeting all other requirements       The patient was identified by name and date of birth  Stefan Millan was informed that this is a telemedicine visit and that the visit is being conducted through [address] [place] [address] [facility] [employer] and patient was informed that this is a secure, HIPAA-compliant platform  She agrees to proceed     My office door was closed  No one else was in the room  She acknowledged consent and understanding of privacy and security of the video platform  The patient has agreed to participate and understands they can discontinue the visit at any time  Patient is aware this is a billable service  Subjective  Stefan Millan is a 28 y o  female for follow up on depression; notices   Slight improvement, feels needs to be increased         Having low motivation, low energy, appetite same, sleep is good, still depressed      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, as a nurse; concentration is good, able to work, but very tired in the morning  Has absorption issues due to hx  Of gastric bypass  Past Medical History:   Diagnosis Date    Abnormal weight gain     Resolved 2017     Anemia     Anxiety     Bariatric surgery status     Depression     DUB (dysfunctional uterine bleeding)     Last assessed 2/15/2013     Fatigue     Fracture of left ankle     Fracture of radius and ulna     Hyperopia     Seen in  by Dr Ashley Jean; no correction required at that time   Miscarriage     Previous miscarriage during first trimester - 11 weeks     Postsurgical malabsorption     Varicella     Wears glasses        Past Surgical History:   Procedure Laterality Date     SECTION      x2    DILATION AND CURETTAGE OF UTERUS      MI EGD TRANSORAL BIOPSY SINGLE/MULTIPLE N/A 2017    Procedure: ESOPHAGOGASTRODUODENOSCOPY (EGD) with bx;  Surgeon: Saadia Cuevas MD;  [facility]; Service: Bariatrics    MI LAP GASTRIC BYPASS/MARIA ELENA-EN-Y N/A 2019    Procedure: LAPAROSCOPIC MARIA ELENA-EN-Y GASTRIC BYPASS AND INTRAOPERATIVE EGD;  Surgeon: Saadia Cuevas MD;  [facility];  Service: Bariatrics       Current Outpatient Medications   Medication Sig Dispense Refill    levonorgestrel (MIRENA) 20 MCG/24HR IUD 1 each by Intrauterine route once      Multiple Vitamin (MULTIVITAMIN) tablet Take 1 tablet by mouth daily      venlafaxine (EFFEXOR-XR) 37 5 mg 24 hr capsule Take  2 tabs in the morning  14 capsule 0    Iron Carbonyl-Vitamin C-FOS (CHEWABLE IRON PO) Take by mouth 2 (two) times a day  (Patient not taking: Reported on 2021)       No current facility-administered medications for this visit  No Known Allergies    Review of Systems   Constitutional: Positive for fatigue  Negative for chills, diaphoresis and fever  Respiratory: Negative for cough, chest tightness and shortness of breath      Gastrointestinal: Negative for abdominal pain, constipation, diarrhea, nausea and vomiting  Musculoskeletal: Negative for arthralgias, back pain, myalgias, neck pain and neck stiffness  Neurological: Negative for dizziness, light-headedness and headaches  Psychiatric/Behavioral: Positive for dysphoric mood  Negative for decreased concentration, self-injury, sleep disturbance and suicidal ideas  The patient is not nervous/anxious  Video Exam    There were no vitals filed for this visit  Physical Exam  Constitutional:       General: She is not in acute distress  Appearance: Normal appearance  She is not ill-appearing, toxic-appearing or diaphoretic  Pulmonary:      Effort: Pulmonary effort is normal    Neurological:      General: No focal deficit present  Mental Status: She is alert and oriented to person, place, and time  Psychiatric:         Behavior: Behavior normal          Thought Content: Thought content normal          Judgment: Judgment normal           I spent 15 minutes directly with the patient during this visit    VIRTUAL VISIT [address] verbally agrees to participate in [employer]  Pt is aware that [employer] could be limited without vital signs or the ability to perform a full hands-on physical Laila Su understands she or the provider may request at any time to terminate the video visit and request the patient to seek care or treatment in person

## 2022-02-09 NOTE — PROGRESS NOTES
Bariatric Follow Up Nutrition Note Virtual visit  i  Name was verified by patient stating name  ii   verified by patient stating  iii  Verification of patient location  iv  Verified patient is in state in which I hold an active license  v  Verified the patient is alone  vi  I would like to verify that you were offered a live visit but are now consenting to this telephone visit  vii  This visit is free    Type of surgery  Gastric bypass: laparoscopic  Surgery Date: 2019  3 years  post-op  Surgeon: Dr Kathleen Muniz  28 y o   female  Wt 82 1 kg (181 lb) Comment: per pt report due to virtual visit  BMI 29 66 kg/m²    Wt Readings from Last 3 Encounters:   22 82 1 kg (181 lb)   21 84 8 kg (187 lb)   21 84 8 kg (187 lb)     Daily [place] Equation:     Weight maintenance= 1892 kcal/day  Estimated calories for weight loss 1392 kcal/day (1# per wk wt loss - sedentary )  Estimated protein needs 69-82 9lbs (1 0-1 2 gms/kg IBW )   Estimated fluid needs 4547-0233 ml/day (30-35 ml/kg IBW )      2017 initial office ypujm=819 6lbs  Weight on Day of Weight Loss Surgery: 262lbs  Weight in (lb) to have BMI = 25: 151 9lbs  Pre-Op Excess Wt: 110 1lbs  Post-Op Wt Loss: 93 9#/ 76% EBWL in 3 year(s)  Pt's self-reported post-op [id number]  Review of History and Medications   Past Medical History:   Diagnosis Date    Abnormal weight gain     Resolved 2017     Anemia     Anxiety     Bariatric surgery status     Depression     DUB (dysfunctional uterine bleeding)     Last assessed 2/15/2013     Fatigue     Fracture of left ankle     Fracture of radius and ulna     Hyperopia     Seen in  by Dr Marta Dawn; no correction required at that time       Miscarriage     Previous miscarriage during first trimester - 11 weeks     Postsurgical malabsorption     Varicella     Wears glasses      Past Surgical History:   Procedure Laterality Date   SECTION      x2    DILATION AND CURETTAGE OF UTERUS      [place] EGD TRANSORAL BIOPSY SINGLE/MULTIPLE N/A 2017    Procedure: ESOPHAGOGASTRODUODENOSCOPY (EGD) with bx;  Surgeon: Flaquito Cornejo MD;  Location: [facility]; Service: Bariatrics    NH LAP GASTRIC BYPASS/MARIA ELENA-EN-Y N/A 2019    Procedure: LAPAROSCOPIC MARIA ELENA-EN-Y GASTRIC BYPASS AND INTRAOPERATIVE EGD;  Surgeon: Flaquito Cornejo MD;  Location[facility];  Service: Bariatrics     Social History     Socioeconomic History    Marital status: /Civil Union     Spouse name: Not on file    Number of children: Not on file    Years of education: Not on file    Highest education level: Not on file   Occupational History    Not on file   Tobacco Use    Smoking status: Former Smoker     Packs/day: 1 00     Years: 5 00     Pack years: 5 00     Types: Cigarettes     Quit date: 2015     Years since quittin 6    Smokeless tobacco: Never Used   Vaping Use    Vaping Use: Never used   Substance and Sexual Activity    Alcohol use: No    Drug use: No    Sexual activity: Yes     Partners: Male     Birth control/protection: I U D     Other Topics Concern    Not on file   Social History Narrative    Does not exercise      Social Determinants of Health     Financial Resource Strain: Not on file   Food Insecurity: Not on file   Transportation Needs: Not on file   Physical Activity: Not on file   Stress: Not on file   Social Connections: Not on file   Intimate Partner Violence: Not on file   Housing Stability: Not on file       Current Outpatient Medications:     Iron Carbonyl-Vitamin C-FOS (CHEWABLE IRON PO), Take by mouth 2 (two) times a day  (Patient not taking: Reported on 2021), Disp: , Rfl:     levonorgestrel (MIRENA) 20 MCG/24HR IUD, 1 each by Intrauterine route once, Disp: , Rfl:     Multiple Vitamin (MULTIVITAMIN) tablet, Take 1 tablet by mouth daily, Disp: , Rfl:     venlafaxine (EFFEXOR-XR) 150 mg 24 hr capsule, Take  1 tabs in the morning , Disp: 30 capsule, Rfl: 3    Food Intake and Lifestyle Assessment   Food Intake Assessment completed via usual diet recall  Wakes 8am  Breakfast: 10:00: one egg, sometimes keto bread 1 slice or sausage/morris  If in a rush keto yogurt  Snack: stopped snacking between  Sometimes pack of 4 PB crackers   Lunch: plant-based wrap  Snack: none  Dinner: has been decreasing dinner portions  Does not sit down to eat but either takes bites while working or tries to eat very fast   Either packs or gets cafeteria food  Chicken chili the past two nights, sometimes salalds  Snack: none  Beverage intake: water, sugar free beverages and coffee  Diet texture/stage: regular  Protein supplement: [employer] occasionally, drinks on the way to the gym  Estimated protein intake per day: 30-60g  Estimated fluid intake per day: 32oz water bottle 2-3 per day  Meals eaten away from home: rare  Typical meal pattern: 1 meals per day and 0-2 snacks per day  Eating Behaviors: Large portion sizes and skips meals, does not eat meals during the day then eats one large meal for dinner, pt reports occasionally eating until uncomfortably full  Pt does not have regular meal routine  Pt reports since her last appointment she has been much more consistent with eating regular meals, portioning meals, and decreasing snacking between meals  Food allergies or intolerances: NKFA  Cultural or Muslim considerations: none noted    Physical Assessment    Physical Activity  Types of exercise:   Pt has completed the 30 day yoga challenge and has now started a new 30-day yoga challenge  Pt reports she is doing well being consistent with this and enjoys yoga  Easier to fit into her day than driving to and from the gym  Current physical limitations: none noted    Psychosocial Assessment   Support systems: spouse and children  Socioeconomic factors: 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, has two young boys    Pt reports she will be switching to first shift in mid-March so she will have a more structured lunch break and will be able to be home to eat dinner with her family  Nutrition Diagnosis-Improving  Diagnosis: Overweight / Obesity (NC-3 3) and Altered GI function (NC-1 4)  Related to: Physical inactivity, Excessive energy intake and Altered GI function  As Evidenced by: BMI >25 and Excessive energy intake     Interventions and Teaching   Patient educated on post-op nutrition guidelines  Patient educated and handouts provided  Adequate hydration  Expected weight loss  Weight loss plateaus/ possibility of weight regain  Exercise  Nutrition considerations after surgery  Protein supplements  Meal planning and preparation  Appropriate carbohydrate, protein, and fat intake, and food/fluid choices to maximize safe weight loss, nutrient intake, and tolerance   Dietary and lifestyle changes  Possible problems with poor eating habits  Techniques for self monitoring and keeping daily food journal  Potential for food intolerance after surgery, and ways to deal with them including: lactose intolerance, nausea, reflux, vomiting, diarrhea, food intolerance, appetite changes, gas  Vitamin / Mineral supplementation of Multivitamin with minerals and Calcium  Once daily capsule multi  Vitamin D3  Calcium    Education provided to: patient  Barriers to learning: No barriers identified  Readiness to change: action and monitoring  Comprehension: verbalizes understanding   Expected Compliance: good    Evaluation/Monitoring   Eating pattern as discussed Tolerance of nutrition prescription Body weight Lab values Physical activity    Goals  Food journal, Exercise 30 minutes 5 times per week and Eat 3 meals per day   Try to eat a small breakfast and a small lunch  Can use a frozen entree when she makes lunch for her son, or can try a protein drink early on in work shift to prevent overeating at dinner    Continue her yoga program      Time Spent:   30 Minutes

## 2022-05-30 ENCOUNTER — APPOINTMENT (EMERGENCY)
Dept: CT IMAGING | Facility: HOSPITAL | Age: 32
End: 2022-05-30
Payer: COMMERCIAL

## 2022-05-30 ENCOUNTER — APPOINTMENT (EMERGENCY)
Dept: RADIOLOGY | Facility: HOSPITAL | Age: 32
End: 2022-05-30
Payer: COMMERCIAL

## 2022-05-30 ENCOUNTER — HOSPITAL ENCOUNTER (EMERGENCY)
Facility: HOSPITAL | Age: 32
Discharge: HOME | End: 2022-05-30
Attending: EMERGENCY MEDICINE | Admitting: EMERGENCY MEDICINE
Payer: COMMERCIAL

## 2022-05-30 VITALS
SYSTOLIC BLOOD PRESSURE: 115 MMHG | BODY MASS INDEX: 29.09 KG/M2 | HEART RATE: 106 BPM | RESPIRATION RATE: 16 BRPM | HEIGHT: 66 IN | DIASTOLIC BLOOD PRESSURE: 60 MMHG | TEMPERATURE: 99.2 F | OXYGEN SATURATION: 96 % | WEIGHT: 181 LBS

## 2022-05-30 DIAGNOSIS — N12 PYELONEPHRITIS: Primary | ICD-10-CM

## 2022-05-30 LAB
ALBUMIN SERPL BCP-MCNC: 3.6 G/DL (ref 3.5–5)
ALP SERPL-CCNC: 61 U/L (ref 46–116)
ALT SERPL W P-5'-P-CCNC: 14 U/L (ref 12–78)
ANION GAP SERPL CALCULATED.3IONS-SCNC: 8 MMOL/L (ref 4–13)
APTT PPP: 48 SECONDS (ref 23–37)
AST SERPL W P-5'-P-CCNC: 12 U/L (ref 5–45)
BACTERIA UR QL AUTO: ABNORMAL /HPF
BASOPHILS # BLD AUTO: 0.03 THOUSANDS/ΜL (ref 0–0.1)
BASOPHILS NFR BLD AUTO: 0 % (ref 0–1)
BILIRUB SERPL-MCNC: 0.6 MG/DL (ref 0.2–1)
BILIRUB UR QL STRIP: NEGATIVE
BUN SERPL-MCNC: 10 MG/DL (ref 5–25)
CALCIUM SERPL-MCNC: 8.2 MG/DL (ref 8.3–10.1)
CHLORIDE SERPL-SCNC: 101 MMOL/L (ref 100–108)
CLARITY UR: ABNORMAL
CO2 SERPL-SCNC: 26 MMOL/L (ref 21–32)
COLOR UR: YELLOW
CREAT SERPL-MCNC: 0.9 MG/DL (ref 0.6–1.3)
EOSINOPHIL # BLD AUTO: 0 THOUSAND/ΜL (ref 0–0.61)
EOSINOPHIL NFR BLD AUTO: 0 % (ref 0–6)
ERYTHROCYTE [DISTWIDTH] IN BLOOD BY AUTOMATED COUNT: 12.2 % (ref 11.6–15.1)
EXT PREG TEST URINE: NEGATIVE
EXT. CONTROL ED NAV: NORMAL
GFR SERPL CREATININE-BSD FRML MDRD: 86 ML/MIN/1.73SQ M
GLUCOSE SERPL-MCNC: 132 MG/DL (ref 65–140)
GLUCOSE UR STRIP-MCNC: NEGATIVE MG/DL
HCT VFR BLD AUTO: 39.4 % (ref 34.8–46.1)
HGB BLD-MCNC: 13.1 G/DL (ref 11.5–15.4)
HGB UR QL STRIP.AUTO: ABNORMAL
IMM GRANULOCYTES # BLD AUTO: 0.06 THOUSAND/UL (ref 0–0.2)
IMM GRANULOCYTES NFR BLD AUTO: 1 % (ref 0–2)
INR PPP: 1.2 (ref 0.84–1.19)
KETONES UR STRIP-MCNC: NEGATIVE MG/DL
LACTATE SERPL-SCNC: 1.6 MMOL/L (ref 0.5–2)
LEUKOCYTE ESTERASE UR QL STRIP: ABNORMAL
LYMPHOCYTES # BLD AUTO: 0.7 THOUSANDS/ΜL (ref 0.6–4.47)
LYMPHOCYTES NFR BLD AUTO: 7 % (ref 14–44)
MCH RBC QN AUTO: 30.5 PG (ref 26.8–34.3)
MCHC RBC AUTO-ENTMCNC: 33.2 G/DL (ref 31.4–37.4)
MCV RBC AUTO: 92 FL (ref 82–98)
MONOCYTES # BLD AUTO: 0.98 THOUSAND/ΜL (ref 0.17–1.22)
MONOCYTES NFR BLD AUTO: 10 % (ref 4–12)
NEUTROPHILS # BLD AUTO: 8.57 THOUSANDS/ΜL (ref 1.85–7.62)
NEUTS SEG NFR BLD AUTO: 82 % (ref 43–75)
NITRITE UR QL STRIP: POSITIVE
NON-SQ EPI CELLS URNS QL MICRO: ABNORMAL /HPF
NRBC BLD AUTO-RTO: 0 /100 WBCS
OTHER STN SPEC: ABNORMAL
PH UR STRIP.AUTO: 7 [PH]
PLATELET # BLD AUTO: 198 THOUSANDS/UL (ref 149–390)
PMV BLD AUTO: 9.7 FL (ref 8.9–12.7)
POTASSIUM SERPL-SCNC: 3.5 MMOL/L (ref 3.5–5.3)
PROCALCITONIN SERPL-MCNC: 0.26 NG/ML
PROT SERPL-MCNC: 7.4 G/DL (ref 6.4–8.2)
PROT UR STRIP-MCNC: ABNORMAL MG/DL
PROTHROMBIN TIME: 15 SECONDS (ref 11.6–14.5)
RBC # BLD AUTO: 4.3 MILLION/UL (ref 3.81–5.12)
RBC #/AREA URNS AUTO: ABNORMAL /HPF
SODIUM SERPL-SCNC: 135 MMOL/L (ref 136–145)
SP GR UR STRIP.AUTO: 1.01 (ref 1–1.03)
UROBILINOGEN UR QL STRIP.AUTO: 0.2 E.U./DL
WBC # BLD AUTO: 10.34 THOUSAND/UL (ref 4.31–10.16)
WBC #/AREA URNS AUTO: ABNORMAL /HPF

## 2022-05-30 PROCEDURE — 80053 COMPREHEN METABOLIC PANEL: CPT

## 2022-05-30 PROCEDURE — 36415 COLL VENOUS BLD VENIPUNCTURE: CPT

## 2022-05-30 PROCEDURE — 99285 EMERGENCY DEPT VISIT HI MDM: CPT | Performed by: EMERGENCY MEDICINE

## 2022-05-30 PROCEDURE — G1004 CDSM NDSC: HCPCS

## 2022-05-30 PROCEDURE — 84145 PROCALCITONIN (PCT): CPT

## 2022-05-30 PROCEDURE — 83605 ASSAY OF LACTIC ACID: CPT

## 2022-05-30 PROCEDURE — 85730 THROMBOPLASTIN TIME PARTIAL: CPT

## 2022-05-30 PROCEDURE — 87086 URINE CULTURE/COLONY COUNT: CPT | Performed by: EMERGENCY MEDICINE

## 2022-05-30 PROCEDURE — 81025 URINE PREGNANCY TEST: CPT | Performed by: EMERGENCY MEDICINE

## 2022-05-30 PROCEDURE — 85025 COMPLETE CBC W/AUTO DIFF WBC: CPT

## 2022-05-30 PROCEDURE — 81001 URINALYSIS AUTO W/SCOPE: CPT | Performed by: EMERGENCY MEDICINE

## 2022-05-30 PROCEDURE — 87040 BLOOD CULTURE FOR BACTERIA: CPT

## 2022-05-30 PROCEDURE — 99284 EMERGENCY DEPT VISIT MOD MDM: CPT

## 2022-05-30 PROCEDURE — 85610 PROTHROMBIN TIME: CPT

## 2022-05-30 PROCEDURE — 96375 TX/PRO/DX INJ NEW DRUG ADDON: CPT

## 2022-05-30 PROCEDURE — 96365 THER/PROPH/DIAG IV INF INIT: CPT

## 2022-05-30 PROCEDURE — 74176 CT ABD & PELVIS W/O CONTRAST: CPT

## 2022-05-30 PROCEDURE — 87186 SC STD MICRODIL/AGAR DIL: CPT | Performed by: EMERGENCY MEDICINE

## 2022-05-30 PROCEDURE — 71045 X-RAY EXAM CHEST 1 VIEW: CPT

## 2022-05-30 PROCEDURE — 96367 TX/PROPH/DG ADDL SEQ IV INF: CPT

## 2022-05-30 PROCEDURE — 87077 CULTURE AEROBIC IDENTIFY: CPT | Performed by: EMERGENCY MEDICINE

## 2022-05-30 RX ORDER — SODIUM CHLORIDE, SODIUM GLUCONATE, SODIUM ACETATE, POTASSIUM CHLORIDE, MAGNESIUM CHLORIDE, SODIUM PHOSPHATE, DIBASIC, AND POTASSIUM PHOSPHATE .53; .5; .37; .037; .03; .012; .00082 G/100ML; G/100ML; G/100ML; G/100ML; G/100ML; G/100ML; G/100ML
1000 INJECTION, SOLUTION INTRAVENOUS ONCE
Status: COMPLETED | OUTPATIENT
Start: 2022-05-30 | End: 2022-05-30

## 2022-05-30 RX ORDER — KETOROLAC TROMETHAMINE 30 MG/ML
15 INJECTION, SOLUTION INTRAMUSCULAR; INTRAVENOUS ONCE
Status: COMPLETED | OUTPATIENT
Start: 2022-05-30 | End: 2022-05-30

## 2022-05-30 RX ORDER — CEFTRIAXONE 1 G/50ML
1000 INJECTION, SOLUTION INTRAVENOUS ONCE
Status: COMPLETED | OUTPATIENT
Start: 2022-05-30 | End: 2022-05-30

## 2022-05-30 RX ORDER — LEVOFLOXACIN 750 MG/1
750 TABLET ORAL EVERY 24 HOURS
Qty: 7 TABLET | Refills: 0 | Status: SHIPPED | OUTPATIENT
Start: 2022-05-30 | End: 2022-06-06

## 2022-05-30 RX ADMIN — KETOROLAC TROMETHAMINE 15 MG: 30 INJECTION, SOLUTION INTRAMUSCULAR at 15:23

## 2022-05-30 RX ADMIN — SODIUM CHLORIDE, SODIUM GLUCONATE, SODIUM ACETATE, POTASSIUM CHLORIDE, MAGNESIUM CHLORIDE, SODIUM PHOSPHATE, DIBASIC, AND POTASSIUM PHOSPHATE 1000 ML: .53; .5; .37; .037; .03; .012; .00082 INJECTION, SOLUTION INTRAVENOUS at 13:32

## 2022-05-30 RX ADMIN — CEFTRIAXONE 1000 MG: 1 INJECTION, SOLUTION INTRAVENOUS at 14:15

## 2022-05-30 NOTE — ED PROVIDER NOTES
History  Chief Complaint   Patient presents with    Flank Pain     Patient presents to ED with bilateral flank pain with intermittent fever since last night      81-year-old female presents for evaluation of bilateral flank pain intermittent fever since last evening  The patient states that she has had 3-4 days of dysuria with foul-smelling urine  The patient believed that she had a urinary tract infection which she did not treat right away and now it has extended into her back with fevers  The patient denies history of recurrent urinary tract infection or kidney infections  She has history of gastric bypass but otherwise without medical complaint  The patient has been taking Tylenol with incomplete relief for fevers  The patient denies any abnormal vaginal discharge  She states that she has had postcoital vaginal bleeding which she thinks is related to her IUD  The patient denies any nausea or vomiting or diarrhea  Prior to Admission Medications   Prescriptions Last Dose Informant Patient Reported? Taking? Iron Carbonyl-Vitamin C-FOS (CHEWABLE IRON PO)   Yes No   Sig: Take by mouth 2 (two) times a day    Patient not taking: Reported on 2021   Multiple Vitamin (MULTIVITAMIN) tablet  Self Yes No   Sig: Take 1 tablet by mouth daily   levonorgestrel (MIRENA) 20 MCG/24HR IUD   Yes No   Si each by Intrauterine route once   venlafaxine (EFFEXOR-XR) 150 mg 24 hr capsule   No No   Sig: TAKE 1 CAPSULE BY MOUTH IN THE MORNING      Facility-Administered Medications: None       Past Medical History:   Diagnosis Date    Abnormal weight gain     Resolved 2017     Anemia     Anxiety     Bariatric surgery status     Depression     DUB (dysfunctional uterine bleeding)     Last assessed 2/15/2013     Fatigue     Fracture of left ankle     Fracture of radius and ulna     Hyperopia     Seen in  by Dr Julieta Jasso; no correction required at that time       Miscarriage     Previous miscarriage during first trimester - 11 weeks     Postsurgical malabsorption     Varicella     Wears glasses        Past Surgical History:   Procedure Laterality Date     SECTION      x2    DILATION AND CURETTAGE OF UTERUS      [place] EGD TRANSORAL BIOPSY SINGLE/MULTIPLE N/A 2017    Procedure: ESOPHAGOGASTRODUODENOSCOPY (EGD) with bx;  Surgeon: Alem Chapman MD;  Location[facility]; Service: Bariatrics    [place] LAP GASTRIC BYPASS/MARIA ELENA-EN-Y N/A 2019    Procedure: LAPAROSCOPIC MARIA ELENA-EN-Y GASTRIC BYPASS AND INTRAOPERATIVE EGD;  Surgeon: Alem Chapman MD;  [facility];  Service: Bariatrics       Family History   Problem Relation Age of Onset    No Known Problems Mother     No Known Problems Father     Lung cancer Paternal Grandfather      I have reviewed and agree with the history as documented  E-Cigarette/Vaping    E-Cigarette Use Never User      E-Cigarette/Vaping Substances     Social History     Tobacco Use    Smoking status: Former Smoker     Packs/day: 1 00     Years: 5 00     Pack years: 5 00     Types: Cigarettes     Quit date: 2015     Years since quittin 9    Smokeless tobacco: Never Used   Vaping Use    Vaping Use: Never used   Substance Use Topics    Alcohol use: No    Drug use: No       Review of Systems   Constitutional: Positive for chills and fever  Genitourinary: Positive for flank pain  Negative for dysuria, vaginal discharge and vaginal pain  All other systems reviewed and are negative  Physical Exam  Physical Exam  Vitals and nursing note reviewed  Constitutional:       General: She is not in acute distress  Appearance: She is well-developed  HENT:      Head: Normocephalic and atraumatic  Right Ear: External ear normal       Left Ear: External ear normal    Eyes:      General: No scleral icterus  Conjunctiva/sclera: Conjunctivae normal       Pupils: Pupils are equal, round, and reactive to light     Cardiovascular: Rate and Rhythm: Regular rhythm  Tachycardia present  Heart sounds: Normal heart sounds  Pulmonary:      Effort: Pulmonary effort is normal  No respiratory distress  Breath sounds: Normal breath sounds  Abdominal:      General: Bowel sounds are normal       Palpations: Abdomen is soft  Tenderness: There is no abdominal tenderness  There is right CVA tenderness and left CVA tenderness  There is no guarding or rebound  Musculoskeletal:         General: Normal range of motion  Cervical back: Normal range of motion  Skin:     General: Skin is warm and dry  Findings: No rash  Neurological:      Mental Status: She is alert and oriented to person, place, and time           Vital Signs  ED Triage Vitals [05/30/22 1300]   Temperature Pulse Respirations Blood Pressure SpO2   (!) 103 1 °F (39 5 °C) (!) 124 16 109/58 98 %      Temp Source Heart Rate Source Patient Position - Orthostatic VS BP Location FiO2 (%)   Oral Monitor Sitting Left arm --      Pain Score       8           Vitals:    05/30/22 1430 05/30/22 1500 05/30/22 1530 05/30/22 1600   BP: 114/57 114/53 113/54 115/60   Pulse: (!) 108 (!) 111 (!) 110 (!) 106   Patient Position - Orthostatic VS: Sitting Sitting Sitting Sitting         Visual Acuity      ED Medications  Medications   multi-electrolyte (PLASMALYTE-A/ISOLYTE-S PH 7 4) IV solution 1,000 mL (0 mL Intravenous Stopped 5/30/22 1422)   cefTRIAXone (ROCEPHIN) IVPB (premix in dextrose) 1,000 mg 50 mL (0 mg Intravenous Stopped 5/30/22 1445)   ketorolac (TORADOL) injection 15 mg (15 mg Intravenous Given 5/30/22 1523)   barium (READI-CAT 2) suspension 900 mL (900 mL Oral Given 5/30/22 1545)       Diagnostic Studies  Results Reviewed     Procedure Component Value Units Date/Time    Procalcitonin [[id number]]  (Abnormal) Collected: 05/30/22 1316    Lab Status: Final result Specimen: Blood from Arm, Left Updated: 05/30/22 1358     Procalcitonin 0 26 ng/ml     Protime-INR [[id number]] (Abnormal) Collected: 05/30/22 1316    Lab Status: Final result Specimen: Blood from Arm, Left Updated: 05/30/22 1353     Protime 15 0 seconds      INR 1 20    APTT [[id number]]  (Abnormal) Collected: 05/30/22 1316    Lab Status: Final result Specimen: Blood from Arm, Left Updated: 05/30/22 1353     PTT 48 seconds     Lactic acid [[id number]]  (Normal) Collected: 05/30/22 1316    Lab Status: Final result Specimen: Blood from Arm, Left Updated: 05/30/22 1351     LACTIC ACID 1 6 mmol/L     Narrative:      Result may be elevated if tourniquet was used during collection  Blood culture #1 [[id number]] Collected: 05/30/22 1343    Lab Status:  In process Specimen: Blood from Arm, Left Updated: 05/30/22 1346    Comprehensive metabolic panel [[id number]]  (Abnormal) Collected: 05/30/22 1316    Lab Status: Final result Specimen: Blood from Arm, Left Updated: 05/30/22 1346     Sodium 135 mmol/L      Potassium 3 5 mmol/L      Chloride 101 mmol/L      CO2 26 mmol/L      ANION GAP 8 mmol/L      BUN 10 mg/dL      Creatinine 0 90 mg/dL      Glucose 132 mg/dL      Calcium 8 2 mg/dL      AST 12 U/L      ALT 14 U/L      Alkaline Phosphatase 61 U/L      Total Protein 7 4 g/dL      Albumin 3 6 g/dL      Total Bilirubin 0 60 mg/dL      eGFR 86 ml/min/1 73sq m     Narrative:      [place] guidelines for Chronic Kidney Disease (CKD):     Stage 1 with normal or high GFR (GFR > 90 mL/min/1 73 square meters)    Stage 2 Mild CKD (GFR = 60-89 mL/min/1 73 square meters)    Stage 3A Moderate CKD (GFR = 45-59 mL/min/1 73 square meters)    Stage 3B Moderate CKD (GFR = 30-44 mL/min/1 73 square meters)    Stage 4 Severe CKD (GFR = 15-29 mL/min/1 73 square meters)    Stage 5 End Stage CKD (GFR <15 mL/min/1 73 square meters)  Note: GFR calculation is accurate only with a steady state creatinine    CBC and differential [[id number]]  (Abnormal) Collected: 05/30/22 1316    Lab Status: Final result Specimen: Blood from Arm, Left Updated: 05/30/22 1344     WBC 10 34 Thousand/uL      RBC 4 30 Million/uL      Hemoglobin 13 1 g/dL      Hematocrit 39 4 %      MCV 92 fL      MCH 30 5 pg      MCHC 33 2 g/dL      RDW 12 2 %      MPV 9 7 fL      Platelets 281 Thousands/uL      nRBC 0 /100 WBCs      Neutrophils Relative 82 %      Immat GRANS % 1 %      Lymphocytes Relative 7 %      Monocytes Relative 10 %      Eosinophils Relative 0 %      Basophils Relative 0 %      Neutrophils Absolute 8 57 Thousands/µL      Immature Grans Absolute 0 06 Thousand/uL      Lymphocytes Absolute 0 70 Thousands/µL      Monocytes Absolute 0 98 Thousand/µL      Eosinophils Absolute 0 00 Thousand/µL      Basophils Absolute 0 03 Thousands/µL     UA w Reflex to Microscopic w Reflex to Culture [[id number]]  (Abnormal) Collected: 05/30/22 1302    Lab Status: Final result Specimen: Urine, Clean Catch Updated: 05/30/22 1331     Color, UA Yellow     Clarity, UA Slightly Cloudy     Specific [place], UA 1 010     pH, UA 7 0     Leukocytes, UA Moderate     Nitrite, UA Positive     Protein, UA 30 (1+) mg/dl      Glucose, UA Negative mg/dl      Ketones, UA Negative mg/dl      Urobilinogen, UA 0 2 E U /dl      Bilirubin, UA Negative     Blood, UA Trace    Urine Microscopic [[id number]]  (Abnormal) Collected: 05/30/22 1302    Lab Status: Final result Specimen: Urine, Clean Catch Updated: 05/30/22 1331     RBC, UA 2-4 /hpf      WBC, UA 20-30 /hpf      Epithelial Cells None Seen /hpf      Bacteria, UA Occasional /hpf      OTHER OBSERVATIONS WBCs Clumped    Urine culture [[id number]] Collected: 05/30/22 1302    Lab Status: In process Specimen: Urine, Clean Catch Updated: 05/30/22 1331    Blood culture #2 [[id number]] Collected: 05/30/22 1316    Lab Status:  In process Specimen: Blood from Hand, Left Updated: 05/30/22 1322    POCT pregnancy, urine [[id number]]  (Normal) Resulted: 05/30/22 1317    Lab Status: Final result Updated: 05/30/22 1317     EXT PREG TEST UR (Ref: Negative) negative Control vaild                 CT abdomen pelvis wo contrast   Final Result by Darleen Flores MD (05/30 1550)      No acute intra-abdominal abnormality  Workstation performed: [id number]         XR chest 1 view portable   ED Interpretation by Conor Boykin DO (05/30 1341)   No acute cardiopulmonary process      Final Result by Michael Izquierdo MD (05/30 1418)      No acute cardiopulmonary disease  Workstation performed: [id number]                    Procedures  Procedures         ED Course  ED Course as of 05/30/22 1651   Mon May 30, 2022   1514 Patient stable upon re-evaluation  I discussed the results all laboratories  Awaiting CT   1559 This patient was evaluated during a time of global shortage of iodinated contrast media  Based on guidance from the [employer] of Radiology, best practices and local institutional approaches, an alternative path for evaluating and managing the patient may have been employed in order to provide optimal care during this shortage  The current situation has been discussed with the patient  SBIRT 20yo+    Flowsheet Row Most Recent Value   SBIRT (25 yo +)    In order to provide better care to our patients, we are screening all of our patients for alcohol and drug use  Would it be okay to ask you these screening questions? No Filed at: 05/30/2022 1310                    MDM  Number of Diagnoses or Management Options  Pyelonephritis  Diagnosis management comments: Differential diagnosis:  Pyelonephritis, urinary tract infection, sepsis, other  Plan is for IV fluids, laboratories and CT abdomen pelvis  Diagnostics reviewed and discussed with patient  Patient improved after medication  The plan is for oral antibiotics after single dose of IV Rocephin      The patient (and any family present) verbalized understanding of the discharge instructions and warnings that would necessitate return to the Emergency Department  All questions were answered prior to discharge  The patient was once again reminded prior to discharge, that her CT scan was done without IV contrast secondary to global contrast shortage       Amount and/or Complexity of Data Reviewed  Clinical lab tests: reviewed  Tests in the radiology section of CPT®: reviewed  Independent visualization of images, tracings, or specimens: yes        Disposition  Final diagnoses:   Pyelonephritis     Time reflects when diagnosis was documented in both MDM as applicable and the Disposition within this note     Time User Action Codes Description Comment    5/30/2022  4:06 PM Natacha Galindo Add [N12] Pyelonephritis       ED Disposition     ED Disposition   Discharge    Condition   Stable    Date/Time   Mon May 30, 2022  4:06 PM    Comment   Ziggy Landrum discharge to home/self care                 Follow-up Information     Follow up With Specialties Details Why Contact Info Additional Information    Parvez Witt DO Family Medicine Schedule an appointment as soon as possible for a visit in 3 days For further evaluation, if not improved [address],  [address] Emergency Department Emergency Medicine Go to  If symptoms worsen [address] [address]  [address] [facility] Emergency Department, [address] [place] [address], [facility], [facility] 10          Discharge Medication List as of 5/30/2022  4:08 PM      START taking these medications    Details   levofloxacin (LEVAQUIN) 750 mg tablet Take 1 tablet (750 mg total) by mouth every 24 hours for 7 days, Starting Mon 5/30/2022, Until Mon 6/6/2022, Normal         CONTINUE these medications which have NOT CHANGED    Details   Iron Carbonyl-Vitamin C-FOS (CHEWABLE IRON PO) Take by mouth 2 (two) times a day , Historical Med      levonorgestrel (MIRENA) 20 MCG/24HR IUD 1 each by Intrauterine route once, Historical Med      Multiple Vitamin (MULTIVITAMIN) tablet Take 1 tablet by mouth daily, Historical Med      venlafaxine (EFFEXOR-XR) 150 mg 24 hr capsule TAKE 1 CAPSULE BY MOUTH IN THE MORNING, Normal             No discharge procedures on file      PDMP Review     None          ED Provider  Electronically Signed by           Radha Ozuna DO  05/30/22 5465

## 2022-06-01 LAB
BACTERIA UR CULT: ABNORMAL
BACTERIA UR CULT: ABNORMAL

## 2022-06-04 LAB
BACTERIA BLD CULT: NORMAL
BACTERIA BLD CULT: NORMAL

## 2022-09-12 ENCOUNTER — OCCMED (OUTPATIENT)
Dept: URGENT CARE | Facility: CLINIC | Age: 32
End: 2022-09-12
Payer: OTHER MISCELLANEOUS

## 2022-09-12 ENCOUNTER — APPOINTMENT (OUTPATIENT)
Dept: RADIOLOGY | Facility: CLINIC | Age: 32
End: 2022-09-12
Payer: COMMERCIAL

## 2022-09-12 DIAGNOSIS — S99.922A INJURY OF LEFT FOOT, INITIAL ENCOUNTER: ICD-10-CM

## 2022-09-12 DIAGNOSIS — S99.922A INJURY OF LEFT FOOT, INITIAL ENCOUNTER: Primary | ICD-10-CM

## 2022-09-12 PROCEDURE — G0382 LEV 3 HOSP TYPE B ED VISIT: HCPCS

## 2022-09-12 PROCEDURE — 99283 EMERGENCY DEPT VISIT LOW MDM: CPT

## 2022-09-12 PROCEDURE — 73630 X-RAY EXAM OF FOOT: CPT

## 2022-09-19 ENCOUNTER — APPOINTMENT (OUTPATIENT)
Dept: URGENT CARE | Facility: CLINIC | Age: 32
End: 2022-09-19
Payer: OTHER MISCELLANEOUS

## 2022-09-19 PROCEDURE — 99213 OFFICE O/P EST LOW 20 MIN: CPT | Performed by: PHYSICIAN ASSISTANT

## 2023-01-10 ENCOUNTER — TELEPHONE (OUTPATIENT)
Dept: FAMILY MEDICINE CLINIC | Facility: CLINIC | Age: 33
End: 2023-01-10

## 2023-01-10 ENCOUNTER — TELEMEDICINE (OUTPATIENT)
Dept: FAMILY MEDICINE CLINIC | Facility: CLINIC | Age: 33
End: 2023-01-10

## 2023-01-10 VITALS — WEIGHT: 198.41 LBS | HEIGHT: 66 IN | BODY MASS INDEX: 31.89 KG/M2

## 2023-01-10 DIAGNOSIS — Z98.84 BARIATRIC SURGERY STATUS: Chronic | ICD-10-CM

## 2023-01-10 DIAGNOSIS — F33.0 MILD EPISODE OF RECURRENT MAJOR DEPRESSIVE DISORDER (HCC): ICD-10-CM

## 2023-01-10 NOTE — PROGRESS NOTES
Virtual Regular Visit    Verification of patient location:    Patient is located in the following state in which I hold an active license [place]      Assessment/Plan:    Problem List Items Addressed This Visit        Other    Bariatric surgery status (Chronic)    Mild episode of recurrent major depressive disorder (HCC)     Stable on venlafaxine         BMI 32 0-32 9,adult - Primary     Recommend starting medication  BMI Counseling: Body mass index is 32 02 kg/m²  The BMI is above normal  Nutrition recommendations include decreasing portion sizes  Exercise recommendations include exercising 3-5 times per week  No pharmacotherapy was ordered  Rationale for BMI follow-up plan is due to patient being overweight or obese  Reason for visit is   Chief Complaint   Patient presents with   • Follow-up     Discuss injections for weight loss - Either WEGOVY or Saxenda  - regained weight after weightloss surgery (regained 21 lbs even after diet and exercise); fluctuate w/ gain and lose and back to 200lbs   Depression Screening complete  Due for physical  [phone]    • Virtual Regular Visit        Encounter provider Eddie Bradshaw DO    Provider located at [address] [place] [id number]      Recent Visits  Date Type Provider Dept   01/10/23 Telephone Wilbur B[facility]   01/10/23 Telemedicine DO Rayshawn Meehan   Showing recent visits within past 7 days and meeting all other requirements  Future Appointments  No visits were found meeting these conditions  Showing future appointments within next 150 days and meeting all other requirements       The patient was identified by name and date of birth  Binta Melissa was informed that this is a telemedicine visit and that the visit is being conducted through the [facility]  She agrees to proceed     My office door was closed  No one else was in the room    She acknowledged consent and understanding of privacy and security of the video platform  The patient has agreed to participate and understands they can discontinue the visit at any time  Patient is aware this is a billable service  Subjective  Phoebe Wallace is a 33 y o  female would like to discuss medications for weight loss   Pt seen to discuss weight loss  Eating healthy, exercising daily 45 min walk  Almost 4 years since weight loss surgery  Having trouble losing weight  Would like to try injectables for weight loss  Does not know if covered by her insurance       Past Medical History:   Diagnosis Date   • Abnormal weight gain     Resolved 2017    • Anemia    • Anxiety    • Bariatric surgery status    • Depression    • DUB (dysfunctional uterine bleeding)     Last assessed 2/15/2013    • Fatigue    • Fracture of left ankle    • Fracture of radius and ulna    • Hyperopia     Seen in  by Dr Mirna Emery; no correction required at that time  • Miscarriage     Previous miscarriage during first trimester - 11 weeks    • Postsurgical malabsorption    • Varicella    • Wears glasses        Past Surgical History:   Procedure Laterality Date   •  SECTION      x2   • DILATION AND CURETTAGE OF UTERUS     • OK EGD TRANSORAL BIOPSY SINGLE/MULTIPLE N/A 2017    Procedure: ESOPHAGOGASTRODUODENOSCOPY (EGD) with bx;  Surgeon: Robin Ramires MD;  Location: [facility];   Service: Bariatrics   • OK LAPS GSTR RSTCV PX W/BYP MARIA ELENA-EN-Y LIMB <150 CM N/A 2019    Procedure: LAPAROSCOPIC MARIA ELENA-EN-Y GASTRIC BYPASS AND INTRAOPERATIVE EGD;  Surgeon: Robin Ramires MD;  Location: [facility];  Service: Bariatrics       Current Outpatient Medications   Medication Sig Dispense Refill   • levonorgestrel (MIRENA) 20 MCG/24HR IUD 1 each by Intrauterine route once     • Multiple Vitamin (MULTIVITAMIN) tablet Take 1 tablet by mouth daily Multivitamin w/ Fe     • venlafaxine (EFFEXOR-XR) 150 mg 24 hr capsule TAKE 1 CAPSULE BY MOUTH EVERY MORNING 30 capsule 0   • tirzepatide (Mounjaro) 2 5 MG/0 5ML Inject 0 5 mL (2 5 mg total) under the skin every 7 days 2 mL 1     No current facility-administered medications for this visit  No Known Allergies    Review of Systems   Constitutional: Negative  Negative for fatigue and fever  HENT: Negative  Eyes: Negative  Respiratory: Negative  Negative for cough  Cardiovascular: Negative  Gastrointestinal: Negative  Endocrine: Negative  Genitourinary: Negative  Musculoskeletal: Negative  Skin: Negative  Allergic/Immunologic: Negative  Neurological: Negative  Psychiatric/Behavioral: Negative  Video Exam    Vitals:    01/10/23 1431   Weight: 90 kg (198 lb 6 6 oz)   Height: 5' 6" (1 676 m)       Physical Exam  Vitals and nursing note reviewed  Constitutional:       Appearance: Normal appearance  She is well-developed  HENT:      Head: Normocephalic and atraumatic  Eyes:      Conjunctiva/sclera: Conjunctivae normal    Pulmonary:      Effort: Pulmonary effort is normal    Neurological:      Mental Status: She is alert and oriented to person, place, and time  Psychiatric:         Behavior: Behavior normal          Thought Content:  Thought content normal          Judgment: Judgment normal           I spent 15 minutes directly with the patient during this visit

## 2023-01-10 NOTE — TELEPHONE ENCOUNTER
Pt states that the prescription sent in today was not covered as you had discussed it may not be  She is requesting that you send in an alternate

## 2023-01-11 DIAGNOSIS — E66.9 OBESITY (BMI 30-39.9): Primary | ICD-10-CM

## 2023-01-11 DIAGNOSIS — E66.09 CLASS 1 OBESITY DUE TO EXCESS CALORIES WITHOUT SERIOUS COMORBIDITY WITH BODY MASS INDEX (BMI) OF 32.0 TO 32.9 IN ADULT: Primary | ICD-10-CM

## 2023-01-11 NOTE — TELEPHONE ENCOUNTER
Patient called back in stating her insurance doesn't cover it and she'll need an alternative called in  The alternative is going to be monoajero and she would need that one called in instead  This medication also needs a prior auth as well  Prior Auth number is going to be, [phone]

## 2023-01-12 NOTE — TELEPHONE ENCOUNTER
Submitted prior auth for patient's [facility] through Cover [employer]  Awaiting determination

## 2023-01-15 PROBLEM — E53.8 B12 DEFICIENCY: Status: RESOLVED | Noted: 2020-11-16 | Resolved: 2023-01-15

## 2023-01-16 ENCOUNTER — TELEPHONE (OUTPATIENT)
Dept: FAMILY MEDICINE CLINIC | Facility: CLINIC | Age: 33
End: 2023-01-16

## 2023-01-16 NOTE — TELEPHONE ENCOUNTER
Submitted prior auth for patient's Mounjaro on 1/12/23  According to patient's insurance Evangelista Orr is considered experimental/investigational and is a non covered medication  Lmom for patient to contact us regarding this rejection  Please have patient contact her insurance company to see if they cover any injectable weight loss medications

## 2023-01-16 NOTE — TELEPHONE ENCOUNTER
I spoke with patient and advised her medication was denied  She states that they will authorize it for diabetes/weight loss but as she is not diabetic and is strictly being used for weight loss it is considered experimental and her insurance will not pay for it  I advised patient that we can try again in the future if it becomes FDA approved

## 2023-02-26 ENCOUNTER — APPOINTMENT (EMERGENCY)
Dept: RADIOLOGY | Facility: HOSPITAL | Age: 33
End: 2023-02-26

## 2023-02-26 ENCOUNTER — HOSPITAL ENCOUNTER (EMERGENCY)
Facility: HOSPITAL | Age: 33
Discharge: HOME | End: 2023-02-26
Attending: EMERGENCY MEDICINE | Admitting: EMERGENCY MEDICINE

## 2023-02-26 VITALS
OXYGEN SATURATION: 97 % | RESPIRATION RATE: 20 BRPM | WEIGHT: 198 LBS | SYSTOLIC BLOOD PRESSURE: 109 MMHG | DIASTOLIC BLOOD PRESSURE: 58 MMHG | HEART RATE: 95 BPM | HEIGHT: 66 IN | BODY MASS INDEX: 31.82 KG/M2 | TEMPERATURE: 103 F

## 2023-02-26 DIAGNOSIS — N10 ACUTE PYELONEPHRITIS: Primary | ICD-10-CM

## 2023-02-26 LAB
2HR DELTA HS TROPONIN: -2 NG/L
ALBUMIN SERPL BCP-MCNC: 4.3 G/DL (ref 3.5–5)
ALP SERPL-CCNC: 63 U/L (ref 34–104)
ALT SERPL W P-5'-P-CCNC: 9 U/L (ref 7–52)
ANION GAP SERPL CALCULATED.3IONS-SCNC: 8 MMOL/L (ref 4–13)
APTT PPP: 40 SECONDS (ref 23–37)
AST SERPL W P-5'-P-CCNC: 11 U/L (ref 13–39)
BACTERIA UR QL AUTO: ABNORMAL /HPF
BASOPHILS # BLD AUTO: 0.03 THOUSANDS/ÂΜL (ref 0–0.1)
BASOPHILS NFR BLD AUTO: 0 % (ref 0–1)
BILIRUB SERPL-MCNC: 0.62 MG/DL (ref 0.2–1)
BILIRUB UR QL STRIP: NEGATIVE
BUN SERPL-MCNC: 11 MG/DL (ref 5–25)
CALCIUM SERPL-MCNC: 9.1 MG/DL (ref 8.4–10.2)
CARDIAC TROPONIN I PNL SERPL HS: 3 NG/L
CARDIAC TROPONIN I PNL SERPL HS: 5 NG/L
CHLORIDE SERPL-SCNC: 100 MMOL/L (ref 96–108)
CLARITY UR: ABNORMAL
CO2 SERPL-SCNC: 24 MMOL/L (ref 21–32)
COLOR UR: YELLOW
CREAT SERPL-MCNC: 0.86 MG/DL (ref 0.6–1.3)
EOSINOPHIL # BLD AUTO: 0.01 THOUSAND/ÂΜL (ref 0–0.61)
EOSINOPHIL NFR BLD AUTO: 0 % (ref 0–6)
ERYTHROCYTE [DISTWIDTH] IN BLOOD BY AUTOMATED COUNT: 12.4 % (ref 11.6–15.1)
EXT PREGNANCY TEST URINE: NEGATIVE
EXT. CONTROL: NORMAL
GFR SERPL CREATININE-BSD FRML MDRD: 90 ML/MIN/1.73SQ M
GLUCOSE SERPL-MCNC: 121 MG/DL (ref 65–140)
GLUCOSE UR STRIP-MCNC: NEGATIVE MG/DL
GRAN CASTS #/AREA URNS LPF: ABNORMAL /[LPF]
HCT VFR BLD AUTO: 41.3 % (ref 34.8–46.1)
HGB BLD-MCNC: 13.7 G/DL (ref 11.5–15.4)
HGB UR QL STRIP.AUTO: ABNORMAL
IMM GRANULOCYTES # BLD AUTO: 0.05 THOUSAND/UL (ref 0–0.2)
IMM GRANULOCYTES NFR BLD AUTO: 0 % (ref 0–2)
INR PPP: 1.08 (ref 0.84–1.19)
KETONES UR STRIP-MCNC: ABNORMAL MG/DL
LACTATE SERPL-SCNC: 0.9 MMOL/L (ref 0.5–2)
LEUKOCYTE ESTERASE UR QL STRIP: ABNORMAL
LYMPHOCYTES # BLD AUTO: 0.88 THOUSANDS/ÂΜL (ref 0.6–4.47)
LYMPHOCYTES NFR BLD AUTO: 7 % (ref 14–44)
MCH RBC QN AUTO: 29.6 PG (ref 26.8–34.3)
MCHC RBC AUTO-ENTMCNC: 33.2 G/DL (ref 31.4–37.4)
MCV RBC AUTO: 89 FL (ref 82–98)
MONOCYTES # BLD AUTO: 1.28 THOUSAND/ÂΜL (ref 0.17–1.22)
MONOCYTES NFR BLD AUTO: 11 % (ref 4–12)
MUCOUS THREADS UR QL AUTO: ABNORMAL
NEUTROPHILS # BLD AUTO: 9.73 THOUSANDS/ÂΜL (ref 1.85–7.62)
NEUTS SEG NFR BLD AUTO: 82 % (ref 43–75)
NITRITE UR QL STRIP: NEGATIVE
NON-SQ EPI CELLS URNS QL MICRO: ABNORMAL /HPF
NRBC BLD AUTO-RTO: 0 /100 WBCS
PH UR STRIP.AUTO: 6 [PH]
PLATELET # BLD AUTO: 244 THOUSANDS/UL (ref 149–390)
PMV BLD AUTO: 9.6 FL (ref 8.9–12.7)
POTASSIUM SERPL-SCNC: 3.5 MMOL/L (ref 3.5–5.3)
PROCALCITONIN SERPL-MCNC: 0.27 NG/ML
PROT SERPL-MCNC: 8.2 G/DL (ref 6.4–8.4)
PROT UR STRIP-MCNC: ABNORMAL MG/DL
PROTHROMBIN TIME: 14.8 SECONDS (ref 11.6–14.5)
RBC # BLD AUTO: 4.63 MILLION/UL (ref 3.81–5.12)
RBC #/AREA URNS AUTO: ABNORMAL /HPF
SODIUM SERPL-SCNC: 132 MMOL/L (ref 135–147)
SP GR UR STRIP.AUTO: 1.01 (ref 1–1.03)
UROBILINOGEN UR STRIP-ACNC: 2 MG/DL
WBC # BLD AUTO: 11.98 THOUSAND/UL (ref 4.31–10.16)
WBC #/AREA URNS AUTO: ABNORMAL /HPF

## 2023-02-26 RX ORDER — ACETAMINOPHEN 325 MG/1
650 TABLET ORAL ONCE
Status: COMPLETED | OUTPATIENT
Start: 2023-02-26 | End: 2023-02-26

## 2023-02-26 RX ORDER — CEFPODOXIME PROXETIL 200 MG/1
200 TABLET, FILM COATED ORAL 2 TIMES DAILY
Qty: 20 TABLET | Refills: 0 | Status: SHIPPED | OUTPATIENT
Start: 2023-02-26 | End: 2023-02-27

## 2023-02-26 RX ORDER — IBUPROFEN 600 MG/1
600 TABLET ORAL ONCE
Status: COMPLETED | OUTPATIENT
Start: 2023-02-26 | End: 2023-02-26

## 2023-02-26 RX ORDER — CEFTRIAXONE 2 G/50ML
2000 INJECTION, SOLUTION INTRAVENOUS ONCE
Status: COMPLETED | OUTPATIENT
Start: 2023-02-26 | End: 2023-02-26

## 2023-02-26 RX ADMIN — CEFTRIAXONE 2000 MG: 2 INJECTION, SOLUTION INTRAVENOUS at 12:35

## 2023-02-26 RX ADMIN — SODIUM CHLORIDE 1000 ML: 0.9 INJECTION, SOLUTION INTRAVENOUS at 11:46

## 2023-02-26 RX ADMIN — IBUPROFEN 600 MG: 600 TABLET, FILM COATED ORAL at 11:48

## 2023-02-26 RX ADMIN — SODIUM CHLORIDE 1000 ML: 0.9 INJECTION, SOLUTION INTRAVENOUS at 12:35

## 2023-02-26 RX ADMIN — ACETAMINOPHEN 650 MG: 325 TABLET, FILM COATED ORAL at 13:23

## 2023-02-26 NOTE — DISCHARGE INSTRUCTIONS
Rest, increase fluids  Take antibiotic twice a day for recheck  Follow up with family doctor in 2-3 days for recheck  Return to ER if symptoms worsen

## 2023-02-26 NOTE — Clinical Note
Larry Robles was seen and treated in our emergency department on 2/26/2023  Diagnosis:     Candido Aldridge  may return to work on return date  She may return on this date: 03/02/2023         If you have any questions or concerns, please don't hesitate to call        My Hatfield PA-C    ______________________________           _______________          _______________  Hospital Representative                              Date                                Time

## 2023-02-26 NOTE — ED PROVIDER NOTES
History  Chief Complaint   Patient presents with   • Back Pain     Pt is c/o back pain  Pt reports 6 months ago she had a kidney infection and it feels like the same thing  Took tylenol 4 hours ago  Patient is a 32 y/o F with h/o anxiety, pyelonephritis that presents to the ED with b/l flank pain and fevers for 3 days  Patient states this feels similar to when she had a kidney infection in the past   I reviewed records from 22, patient seen in the ER for back pain and fever  She was found to have pyelonephritis and given IV rocephin and discharged on levaquin  Urine culture growing E coli  She denies dysuria, frequency or urgency  No sick contacts  She states she did have covid a couple weeks ago  She feels lightheaded and had near syncope  She denies chest pain or SOB  No abdominal pain, nausea, or vomiting  History provided by:  Patient  Flank Pain  Pain location:  L flank and R flank  Pain quality: aching    Pain radiates to:  Does not radiate  Pain severity:  Moderate  Onset quality:  Gradual  Duration:  3 days  Timing:  Constant  Progression:  Worsening  Chronicity:  Recurrent  Context: not sick contacts, not suspicious food intake and not trauma    Relieved by:  Nothing  Worsened by:  Nothing  Ineffective treatments:  Acetaminophen and NSAIDs  Associated symptoms: chills, fatigue and fever    Associated symptoms: no chest pain, no constipation, no cough, no diarrhea, no dysuria, no hematuria, no nausea, no shortness of breath and no vomiting    Risk factors: not pregnant and no recent hospitalization        Prior to Admission Medications   Prescriptions Last Dose Informant Patient Reported? Taking?    Multiple Vitamin (MULTIVITAMIN) tablet   Yes No   Sig: Take 1 tablet by mouth daily Multivitamin w/ Fe   levonorgestrel (MIRENA) 20 MCG/24HR IUD   Yes No   Si each by Intrauterine route once   tirzepatide (Mounjaro) 2 5 MG/0 5ML   No No   Sig: Inject 0 5 mL (2 5 mg total) under the skin every 7 days   venlafaxine (EFFEXOR-XR) 150 mg 24 hr capsule   No No   Sig: TAKE 1 CAPSULE BY MOUTH EVERY DAY IN THE MORNING      Facility-Administered Medications: None       Past Medical History:   Diagnosis Date   • Abnormal weight gain     Resolved 2017    • Anemia    • Anxiety    • Bariatric surgery status    • Depression    • DUB (dysfunctional uterine bleeding)     Last assessed 2/15/2013    • Fatigue    • Fracture of left ankle    • Fracture of radius and ulna    • Hyperopia     Seen in  by Dr Chino Arroyo; no correction required at that time  • Miscarriage     Previous miscarriage during first trimester - 11 weeks    • Postsurgical malabsorption    • Varicella    • Wears glasses        Past Surgical History:   Procedure Laterality Date   •  SECTION      x2   • DILATION AND CURETTAGE OF UTERUS     • ND EGD TRANSORAL BIOPSY SINGLE/MULTIPLE N/A 2017    Procedure: ESOPHAGOGASTRODUODENOSCOPY (EGD) with bx;  Surgeon: Mindy Cazares MD;  Location: [facility]; Service: Bariatrics   • ND LAPS GSTR RSTCV PX W/BYP MARIA ELENA-EN-Y LIMB <150 CM N/A 2019    Procedure: LAPAROSCOPIC MARIA ELENA-EN-Y GASTRIC BYPASS AND INTRAOPERATIVE EGD;  Surgeon: Mindy Cazares MD;  Location: [facility];  Service: Bariatrics       Family History   Problem Relation Age of Onset   • No Known Problems Mother    • No Known Problems Father    • Lung cancer Paternal Grandfather      I have reviewed and agree with the history as documented      E-Cigarette/Vaping   • E-Cigarette Use Never User      E-Cigarette/Vaping Substances     Social History     Tobacco Use   • Smoking status: Former     Packs/day: 1 00     Years: 5 00     Pack years: 5 00     Types: Cigarettes     Quit date: 2015     Years since quittin 6   • Smokeless tobacco: Never   Vaping Use   • Vaping Use: Never used   Substance Use Topics   • Alcohol use: No   • Drug use: No       Review of Systems   Constitutional: Positive for chills, fatigue and fever  HENT: Negative  Respiratory: Negative for cough and shortness of breath  Cardiovascular: Negative for chest pain, palpitations and leg swelling  Gastrointestinal: Negative for abdominal pain, constipation, diarrhea, nausea and vomiting  Genitourinary: Positive for flank pain  Negative for dysuria, frequency and hematuria  Musculoskeletal: Positive for back pain  Negative for neck pain  Skin: Negative for color change, pallor and rash  Neurological: Positive for dizziness, weakness and light-headedness  Negative for numbness and headaches  Psychiatric/Behavioral: Negative for confusion  All other systems reviewed and are negative  Physical Exam  Physical Exam  Vitals and nursing note reviewed  Constitutional:       General: She is not in acute distress  Appearance: Normal appearance  She is well-developed, well-groomed and normal weight  She is not ill-appearing or diaphoretic  HENT:      Head: Normocephalic and atraumatic  Right Ear: Hearing normal       Left Ear: Hearing normal       Nose: Nose normal       Mouth/Throat:      Mouth: Mucous membranes are moist    Eyes:      Conjunctiva/sclera: Conjunctivae normal       Pupils: Pupils are equal    Cardiovascular:      Rate and Rhythm: Regular rhythm  Tachycardia present  Heart sounds: Normal heart sounds  Pulmonary:      Effort: Pulmonary effort is normal       Breath sounds: Normal breath sounds  No wheezing, rhonchi or rales  Abdominal:      General: Abdomen is flat  Bowel sounds are normal       Palpations: Abdomen is soft  Tenderness: There is no abdominal tenderness  There is right CVA tenderness and left CVA tenderness  Musculoskeletal:         General: Normal range of motion  Cervical back: Normal range of motion  Skin:     General: Skin is warm and dry  Coloration: Skin is not jaundiced or pale  Findings: No rash  Neurological:      General: No focal deficit present  Mental Status: She is alert and oriented to person, place, and time  Motor: No weakness  Psychiatric:         Mood and Affect: Mood normal          Behavior: Behavior is cooperative           Vital Signs  ED Triage Vitals [02/26/23 1127]   Temperature Pulse Respirations Blood Pressure SpO2   (!) 103 °F (39 4 °C) (!) 136 20 115/56 100 %      Temp Source Heart Rate Source Patient Position - Orthostatic VS BP Location FiO2 (%)   Oral Monitor Sitting Left arm --      Pain Score       4           Vitals:    02/26/23 1400 02/26/23 1415 02/26/23 1430 02/26/23 1445   BP: 108/55 106/54 106/60 109/58   Pulse: 101 95 98 95   Patient Position - Orthostatic VS:             Visual Acuity      ED Medications  Medications   sodium chloride 0 9 % bolus 1,000 mL (0 mL Intravenous Stopped 2/26/23 1235)   ibuprofen (MOTRIN) tablet 600 mg (600 mg Oral Given 2/26/23 1148)   cefTRIAXone (ROCEPHIN) IVPB (premix in dextrose) 2,000 mg 50 mL (0 mg Intravenous Stopped 2/26/23 1306)   sodium chloride 0 9 % bolus 1,000 mL (0 mL Intravenous Stopped 2/26/23 1355)   acetaminophen (TYLENOL) tablet 650 mg (650 mg Oral Given 2/26/23 1323)       Diagnostic Studies  Results Reviewed     Procedure Component Value Units Date/Time    HS Troponin I 2hr [[id number]]  (Normal) Collected: 02/26/23 1406    Lab Status: Final result Specimen: Blood from Arm, Left Updated: 02/26/23 1432     hs TnI 2hr 3 ng/L      Delta 2hr hsTnI -2 ng/L     Urine Microscopic [[id number]]  (Abnormal) Collected: 02/26/23 1142    Lab Status: Final result Specimen: Urine, Clean Catch Updated: 02/26/23 1239     RBC, UA None Seen /hpf      WBC, UA Innumerable /hpf      Epithelial Cells Moderate /hpf      Bacteria, UA Innumerable /hpf      MUCUS THREADS Occasional     Granular Casts, UA 0-3    HS Troponin 0hr (reflex protocol) [[id number]]  (Normal) Collected: 02/26/23 1157    Lab Status: Final result Specimen: Blood from Arm, Left Updated: 02/26/23 1230     hs TnI 0hr 5 ng/L     HS Troponin I 4hr [[id number]]     Lab Status: No result Specimen: Blood     Urine culture [[id number]] Collected: 02/26/23 1142    Lab Status: In process Specimen: Urine, Clean Catch Updated: 02/26/23 1221    Procalcitonin [[id number]]  (Abnormal) Collected: 02/26/23 1142    Lab Status: Final result Specimen: Blood from Arm, Right Updated: 02/26/23 1218     Procalcitonin 0 27 ng/ml     Lactic acid [[id number]]  (Normal) Collected: 02/26/23 1142    Lab Status: Final result Specimen: Blood from Arm, Right Updated: 02/26/23 1213     LACTIC ACID 0 9 mmol/L     Narrative:      Result may be elevated if tourniquet was used during collection      Protime-INR [[id number]]  (Abnormal) Collected: 02/26/23 1142    Lab Status: Final result Specimen: Blood from Arm, Right Updated: 02/26/23 1209     Protime 14 8 seconds      INR 1 08    APTT [[id number]]  (Abnormal) Collected: 02/26/23 1142    Lab Status: Final result Specimen: Blood from Arm, Right Updated: 02/26/23 1209     PTT 40 seconds     Comprehensive metabolic panel [[id number]]  (Abnormal) Collected: 02/26/23 1142    Lab Status: Final result Specimen: Blood from Arm, Right Updated: 02/26/23 1209     Sodium 132 mmol/L      Potassium 3 5 mmol/L      Chloride 100 mmol/L      CO2 24 mmol/L      ANION GAP 8 mmol/L      BUN 11 mg/dL      Creatinine 0 86 mg/dL      Glucose 121 mg/dL      Calcium 9 1 mg/dL      AST 11 U/L      ALT 9 U/L      Alkaline Phosphatase 63 U/L      Total Protein 8 2 g/dL      Albumin 4 3 g/dL      Total Bilirubin 0 62 mg/dL      eGFR 90 ml/min/1 73sq m     Narrative:      [facility] guidelines for Chronic Kidney Disease (CKD):   •  Stage 1 with normal or high GFR (GFR > 90 mL/min/1 73 square meters)  •  Stage 2 Mild CKD (GFR = 60-89 mL/min/1 73 square meters)  •  Stage 3A Moderate CKD (GFR = 45-59 mL/min/1 73 square meters)  •  Stage 3B Moderate CKD (GFR = 30-44 mL/min/1 73 square meters)  •  Stage 4 Severe CKD (GFR = 15-29 mL/min/1 73 square meters)  •  Stage 5 End Stage CKD (GFR <15 mL/min/1 73 square meters)  Note: GFR calculation is accurate only with a steady state creatinine    UA w Reflex to Microscopic w Reflex to Culture [[id number]]  (Abnormal) Collected: 02/26/23 1142    Lab Status: Final result Specimen: Urine, Clean Catch Updated: 02/26/23 1200     Color, UA Yellow     Clarity, UA Slightly Cloudy     Specific Gravity, UA 1 015     pH, UA 6 0     Leukocytes, UA Large     Nitrite, UA Negative     Protein, UA 30 (1+) mg/dl      Glucose, UA Negative mg/dl      Ketones, UA 10 (1+) mg/dl      Urobilinogen, UA 2 0 mg/dl      Bilirubin, UA Negative     Occult Blood, UA Trace    CBC and differential [[id number]]  (Abnormal) Collected: 02/26/23 1142    Lab Status: Final result Specimen: Blood from Arm, Right Updated: 02/26/23 1158     WBC 11 98 Thousand/uL      RBC 4 63 Million/uL      Hemoglobin 13 7 g/dL      Hematocrit 41 3 %      MCV 89 fL      MCH 29 6 pg      MCHC 33 2 g/dL      RDW 12 4 %      MPV 9 6 fL      Platelets 031 Thousands/uL      nRBC 0 /100 WBCs      Neutrophils Relative 82 %      Immat GRANS % 0 %      Lymphocytes Relative 7 %      Monocytes Relative 11 %      Eosinophils Relative 0 %      Basophils Relative 0 %      Neutrophils Absolute 9 73 Thousands/µL      Immature Grans Absolute 0 05 Thousand/uL      Lymphocytes Absolute 0 88 Thousands/µL      Monocytes Absolute 1 28 Thousand/µL      Eosinophils Absolute 0 01 Thousand/µL      Basophils Absolute 0 03 Thousands/µL     Blood culture #1 [[id number]] Collected: 02/26/23 1142    Lab Status: In process Specimen: Blood from Arm, Left Updated: 02/26/23 1149    Blood culture #2 [[id number]] Collected: 02/26/23 1142    Lab Status:  In process Specimen: Blood from Arm, Right Updated: 02/26/23 1149    POCT pregnancy, urine [[id number]]  (Normal) Resulted: 02/26/23 1147    Lab Status: Final result Specimen: Urine Updated: 02/26/23 1147     EXT Preg Test, Ur Negative     Control Valid XR chest portable    (Results Pending)              Procedures  ECG 12 Lead Documentation Only    Date/Time: 2/26/2023 11:41 AM  Performed by: Yoli Quintanilla PA-C  Authorized by: Yoli Quintanilla PA-C     Indications / Diagnosis:  Tachycardia  ECG reviewed by me, the ED Provider: yes    Patient location:  ED  Previous ECG:     Previous ECG:  Compared to current    Comparison ECG info:  ST depression now present in inferolateral leads  Similarity:  Changes noted  Rate:     ECG rate:  122    ECG rate assessment: tachycardic    Rhythm:     Rhythm: sinus tachycardia    ST segments:     ST segments:  Abnormal    Depression:  V4, V5, V6, II, III and aVF  T waves:     T waves: inverted      Inverted:  II, III, aVF, V6, V5 and V4    ECG 12 Lead Documentation Only    Date/Time: 2/26/2023 2:39 PM  Performed by: Yoli Quintanilla PA-C  Authorized by: Yoli Quintanilla PA-C     Indications / Diagnosis:  Repeat   ECG reviewed by me, the ED Provider: yes    Patient location:  ED  Previous ECG:     Previous ECG:  Compared to current    Comparison ECG info:  ST depression no longer present  PVCs now present  Similarity:  Changes noted  Rate:     ECG rate:  98  Rhythm:     Rhythm: sinus rhythm    Ectopy:     Ectopy: PVCs      PVCs:  Frequent  ST segments:     ST segments:  Normal  T waves:     T waves: normal               ED Course  ED Course as of 02/26/23 1509   Sun Feb 26, 2023   1320 Patient feeling better, heart rate improving  She is willing to stay for repeat trop  She is requesting tylenol for headache  SBIRT 20yo+    Flowsheet Row Most Recent Value   SBIRT (25 yo +)    In order to provide better care to our patients, we are screening all of our patients for alcohol and drug use  Would it be okay to ask you these screening questions? Yes Filed at: 02/26/2023 1152   Initial Alcohol Screen: US AUDIT-C     1   How often do you have a drink containing alcohol? 0 Filed at: 02/26/2023 1152   2  How many drinks containing alcohol do you have on a typical day you are drinking? 0 Filed at: 02/26/2023 1152   3a  Male UNDER 65: How often do you have five or more drinks on one occasion? 0 Filed at: 02/26/2023 1152   3b  FEMALE Any Age, or MALE 65+: How often do you have 4 or more drinks on one occassion? 0 Filed at: 02/26/2023 1152   Audit-C Score 0 Filed at: 02/26/2023 1152   MORIAH: How many times in the past year have you    Used an illegal drug or used a prescription medication for non-medical reasons? Never Filed at: 02/26/2023 1152                    Medical Decision Making  Patient is a 32 y/o F with h/o pyelonephritis presents to the ED with b/l flank pain and fevers, similar to prior infection  Will order labs, UA to r/o UTI, anemia, electrolyte abnormality  Lactic normal, BP normal, no concern for severe sepsis  Patient's heart rate improved with IV fluids and tylenol/motrin for fever  No concern for kidney stone, no need for further imaging  I reviewed prior urine culture, sensitive to cephalosporins, will give IV rocephin and vantin  Patient given strict return precautions  ADvised f/u with PCP for recheck  Acute pyelonephritis: acute illness or injury  Amount and/or Complexity of Data Reviewed  Labs: ordered  Radiology: ordered  ECG/medicine tests: ordered and independent interpretation performed  Risk  OTC drugs  Prescription drug management  Disposition  Final diagnoses:   Acute pyelonephritis     Time reflects when diagnosis was documented in both MDM as applicable and the Disposition within this note     Time User Action Codes Description Comment    2/26/2023  2:44 PM Kierra Benoit Add [N10] Acute pyelonephritis       ED Disposition     ED Disposition   Discharge    Condition   Stable    Date/Time   Sun Feb 26, 2023  2:44 PM    [address] discharge to home/self care                 Follow-up Information Follow up With Specialties Details Why Contact Info    Olga Hampton DO Family Medicine Call in 2 days For karin Lopes [address]  [phone]            Discharge Medication List as of 2/26/2023  2:48 PM      START taking these medications    Details   cefpodoxime (VANTIN) 200 mg tablet Take 1 tablet (200 mg total) by mouth 2 (two) times a day for 10 days, Starting Sun 2/26/2023, Until Wed 3/8/2023, Normal         CONTINUE these medications which have NOT CHANGED    Details   levonorgestrel (MIRENA) 20 MCG/24HR IUD 1 each by Intrauterine route once, Historical Med      Multiple Vitamin (MULTIVITAMIN) tablet Take 1 tablet by mouth daily Multivitamin w/ Fe, Historical Med      tirzepatide (Mounjaro) 2 5 MG/0 5ML Inject 0 5 mL (2 5 mg total) under the skin every 7 days, Starting Wed 1/11/2023, Normal      venlafaxine (EFFEXOR-XR) 150 mg 24 hr capsule TAKE 1 CAPSULE BY MOUTH EVERY DAY IN THE MORNING, Normal             No discharge procedures on file      PDMP Review     None          ED Provider  Electronically Signed by           Vita Enciso PA-C  02/26/23 4331

## 2023-02-27 ENCOUNTER — TELEMEDICINE (OUTPATIENT)
Dept: FAMILY MEDICINE CLINIC | Facility: CLINIC | Age: 33
End: 2023-02-27

## 2023-02-27 VITALS — TEMPERATURE: 102 F

## 2023-02-27 DIAGNOSIS — N12 PYELONEPHRITIS: Primary | ICD-10-CM

## 2023-02-27 RX ORDER — NORGESTIMATE AND ETHINYL ESTRADIOL 0.25-0.035
1 KIT ORAL DAILY
COMMUNITY
Start: 2023-01-13

## 2023-02-27 RX ORDER — CIPROFLOXACIN 500 MG/1
500 TABLET, FILM COATED ORAL EVERY 12 HOURS SCHEDULED
Qty: 14 TABLET | Refills: 0 | Status: SHIPPED | OUTPATIENT
Start: 2023-02-27 | End: 2023-03-06

## 2023-02-27 NOTE — PROGRESS NOTES
Virtual Regular Visit    Verification of patient location:    Patient is located in the following state in which I hold an active license [place]      Assessment/Plan:    Problem List Items Addressed This Visit        Genitourinary    Pyelonephritis - Primary     Awaiting urine culture and blood cultures  Stop keflex start cipro twice daily- going of sensitivity of culture 5/2022  Stay well hydrated  Encouraged deep breathing exercises  Ambulation   If still with high fevers after 36 hours on antibiotics will need to go back to ER         Relevant Medications    ciprofloxacin (CIPRO) 500 mg tablet            Reason for visit is   Chief Complaint   Patient presents with   • Virtual Regular Visit     Ed follow up, fever since Friday , tylenol , ibuprofen  • Virtual Regular Visit        Encounter provider DAJUAN Valles    Provider located at [address] [facility] [address]      Recent Visits  No visits were found meeting these conditions  Showing recent visits within past 7 days and meeting all other requirements  Today's Visits  Date Type Provider Dept   02/27/23 Telemedicine DAJUAN Valles [facility]   Showing today's visits and meeting all other requirements  Future Appointments  No visits were found meeting these conditions  Showing future appointments within next 150 days and meeting all other requirements       The patient was identified by name and date of birth  Sadie Villalta was informed that this is a telemedicine visit and that the visit is being conducted through the [employer]  She agrees to proceed     My office door was closed  No one else was in the room  She acknowledged consent and understanding of privacy and security of the video platform  The patient has agreed to participate and understands they can discontinue the visit at any time  Patient is aware this is a billable service       Subjective  Rivka Prows Cyndy Ellis is a 33 y o  female presenting for ER follow up with fevers  Had some low back pain last week saw a chiropractor earlier in the week thought its was overworked then on Friday afternoon developed a high fever  Fevers have been present since Friday  Tmax 104 last night  Went to ER on  was treated for pyelonephritis  Given Rocephin IV x 1 dose and sent home on keflex  Urine culture and blood cultures still pending today  Felt the worse last night  Gets 2 hours after taking tylenol/motrin where she sweats and feels better but temp has not come down below 100 since Friday  Feels drained  No urinary burning, urgency, frequency  Sweating so much, having a hard time keep up with fevers and hydration  Has been alternating between tylenol and motrin every 4 hours           Past Medical History:   Diagnosis Date   • Abnormal weight gain     Resolved 2017    • Anemia    • Anxiety    • Bariatric surgery status    • Depression    • DUB (dysfunctional uterine bleeding)     Last assessed 2/15/2013    • Fatigue    • Fracture of left ankle    • Fracture of radius and ulna    • Hyperopia     Seen in  by Dr Ana Arce; no correction required at that time  • Miscarriage     Previous miscarriage during first trimester - 11 weeks    • Postsurgical malabsorption    • Varicella    • Wears glasses        Past Surgical History:   Procedure Laterality Date   •  SECTION      x2   • DILATION AND CURETTAGE OF UTERUS     • [place] EGD TRANSORAL BIOPSY SINGLE/MULTIPLE N/A 2017    Procedure: ESOPHAGOGASTRODUODENOSCOPY (EGD) with bx;  Surgeon: Demetrius Wilkins MD;  [facility]: [facility];   Service: Bariatrics   • [place] LAPS GSTR RSTCV PX W/BYP MARIA ELENA-EN-Y LIMB <150 CM N/A 2019    Procedure: LAPAROSCOPIC MARIA ELENA-EN-Y GASTRIC BYPASS AND INTRAOPERATIVE EGD;  Surgeon: Demetrius Wilkins MD;  Location[facility];  Service: Bariatrics       Current Outpatient Medications   Medication Sig Dispense Refill   • ciprofloxacin (CIPRO) 500 mg tablet Take 1 tablet (500 mg total) by mouth every 12 (twelve) hours for 7 days 14 tablet 0   • levonorgestrel (MIRENA) 20 MCG/24HR IUD 1 each by Intrauterine route once     • Cassandra 0 25-35 MG-MCG per tablet Take 1 tablet by mouth daily     • Multiple Vitamin (MULTIVITAMIN) tablet Take 1 tablet by mouth daily Multivitamin w/ Fe     • tirzepatide (Mounjaro) 2 5 MG/0 5ML Inject 0 5 mL (2 5 mg total) under the skin every 7 days 2 mL 1   • venlafaxine (EFFEXOR-XR) 150 mg 24 hr capsule TAKE 1 CAPSULE BY MOUTH EVERY DAY IN THE MORNING 90 capsule 1     No current facility-administered medications for this visit  No Known Allergies    Review of Systems   Constitutional: Positive for activity change, appetite change, chills, diaphoresis, fatigue and fever  HENT: Negative  Respiratory: Negative  Cardiovascular: Negative  Gastrointestinal: Negative  Musculoskeletal: Positive for back pain  Skin: Negative  Neurological: Positive for dizziness and syncope (Saturday night)  Hematological: Negative  Video Exam    Vitals:    02/27/23 1001   Temp: (!) 102 °F (38 9 °C)   TempSrc: Tympanic       Physical Exam  Vitals and nursing note reviewed  Constitutional:       General: She is not in acute distress  Appearance: Normal appearance  She is well-developed  She is ill-appearing  HENT:      Head: Normocephalic and atraumatic  Eyes:      Conjunctiva/sclera: Conjunctivae normal    Pulmonary:      Effort: Pulmonary effort is normal  No respiratory distress  Neurological:      Mental Status: She is alert and oriented to person, place, and time  Psychiatric:         Behavior: Behavior normal          Thought Content:  Thought content normal          Judgment: Judgment normal           I spent 10 minutes directly with the patient during this visit

## 2023-02-27 NOTE — PATIENT INSTRUCTIONS
Awaiting urine culture and blood cultures  Stop keflex start cipro twice daily  Stay well hydrated  Encouraged deep breathing exercises  Ambulation

## 2023-02-27 NOTE — ASSESSMENT & PLAN NOTE
Awaiting urine culture and blood cultures  Stop keflex start cipro twice daily- going of sensitivity of culture 5/2022  Stay well hydrated  Encouraged deep breathing exercises  Ambulation   If still with high fevers after 36 hours on antibiotics will need to go back to ER

## 2023-02-28 LAB
ATRIAL RATE: 122 BPM
ATRIAL RATE: 98 BPM
BACTERIA UR CULT: ABNORMAL
P AXIS: 48 DEGREES
P AXIS: 57 DEGREES
PR INTERVAL: 126 MS
PR INTERVAL: 132 MS
QRS AXIS: 59 DEGREES
QRS AXIS: 74 DEGREES
QRSD INTERVAL: 76 MS
QRSD INTERVAL: 86 MS
QT INTERVAL: 282 MS
QT INTERVAL: 350 MS
QTC INTERVAL: 401 MS
QTC INTERVAL: 446 MS
T WAVE AXIS: -53 DEGREES
T WAVE AXIS: 15 DEGREES
VENTRICULAR RATE: 122 BPM
VENTRICULAR RATE: 98 BPM

## 2023-03-01 ENCOUNTER — HOSPITAL ENCOUNTER (EMERGENCY)
Facility: HOSPITAL | Age: 33
Discharge: HOME | End: 2023-03-01
Attending: EMERGENCY MEDICINE | Admitting: EMERGENCY MEDICINE

## 2023-03-01 ENCOUNTER — TELEMEDICINE (OUTPATIENT)
Dept: FAMILY MEDICINE CLINIC | Facility: CLINIC | Age: 33
End: 2023-03-01

## 2023-03-01 ENCOUNTER — APPOINTMENT (EMERGENCY)
Dept: CT IMAGING | Facility: HOSPITAL | Age: 33
End: 2023-03-01

## 2023-03-01 VITALS
TEMPERATURE: 98.4 F | RESPIRATION RATE: 16 BRPM | DIASTOLIC BLOOD PRESSURE: 78 MMHG | OXYGEN SATURATION: 100 % | HEART RATE: 88 BPM | SYSTOLIC BLOOD PRESSURE: 122 MMHG

## 2023-03-01 VITALS — BODY MASS INDEX: 31.96 KG/M2 | WEIGHT: 198 LBS

## 2023-03-01 DIAGNOSIS — N12 PYELONEPHRITIS: Primary | ICD-10-CM

## 2023-03-01 DIAGNOSIS — R42 DIZZINESS: Primary | ICD-10-CM

## 2023-03-01 DIAGNOSIS — G93.0 ARACHNOID CYST: ICD-10-CM

## 2023-03-01 DIAGNOSIS — N12 PYELONEPHRITIS: ICD-10-CM

## 2023-03-01 LAB
ALBUMIN SERPL BCP-MCNC: 3.9 G/DL (ref 3.5–5)
ALP SERPL-CCNC: 56 U/L (ref 34–104)
ALT SERPL W P-5'-P-CCNC: 19 U/L (ref 7–52)
ANION GAP SERPL CALCULATED.3IONS-SCNC: 6 MMOL/L (ref 4–13)
AST SERPL W P-5'-P-CCNC: 19 U/L (ref 13–39)
BASOPHILS # BLD AUTO: 0.03 THOUSANDS/ÂΜL (ref 0–0.1)
BASOPHILS NFR BLD AUTO: 1 % (ref 0–1)
BILIRUB SERPL-MCNC: 0.24 MG/DL (ref 0.2–1)
BILIRUB UR QL STRIP: NEGATIVE
BUN SERPL-MCNC: 10 MG/DL (ref 5–25)
CALCIUM SERPL-MCNC: 9.2 MG/DL (ref 8.4–10.2)
CHLORIDE SERPL-SCNC: 107 MMOL/L (ref 96–108)
CLARITY UR: CLEAR
CO2 SERPL-SCNC: 27 MMOL/L (ref 21–32)
COLOR UR: YELLOW
CREAT SERPL-MCNC: 0.65 MG/DL (ref 0.6–1.3)
EOSINOPHIL # BLD AUTO: 0.06 THOUSAND/ÂΜL (ref 0–0.61)
EOSINOPHIL NFR BLD AUTO: 1 % (ref 0–6)
ERYTHROCYTE [DISTWIDTH] IN BLOOD BY AUTOMATED COUNT: 12.1 % (ref 11.6–15.1)
GFR SERPL CREATININE-BSD FRML MDRD: 118 ML/MIN/1.73SQ M
GLUCOSE SERPL-MCNC: 120 MG/DL (ref 65–140)
GLUCOSE UR STRIP-MCNC: NEGATIVE MG/DL
HCT VFR BLD AUTO: 40 % (ref 34.8–46.1)
HGB BLD-MCNC: 12.8 G/DL (ref 11.5–15.4)
HGB UR QL STRIP.AUTO: NEGATIVE
IMM GRANULOCYTES # BLD AUTO: 0.03 THOUSAND/UL (ref 0–0.2)
IMM GRANULOCYTES NFR BLD AUTO: 1 % (ref 0–2)
KETONES UR STRIP-MCNC: NEGATIVE MG/DL
LEUKOCYTE ESTERASE UR QL STRIP: NEGATIVE
LIPASE SERPL-CCNC: 27 U/L (ref 11–82)
LYMPHOCYTES # BLD AUTO: 1.64 THOUSANDS/ÂΜL (ref 0.6–4.47)
LYMPHOCYTES NFR BLD AUTO: 33 % (ref 14–44)
MCH RBC QN AUTO: 29.4 PG (ref 26.8–34.3)
MCHC RBC AUTO-ENTMCNC: 32 G/DL (ref 31.4–37.4)
MCV RBC AUTO: 92 FL (ref 82–98)
MONOCYTES # BLD AUTO: 0.36 THOUSAND/ÂΜL (ref 0.17–1.22)
MONOCYTES NFR BLD AUTO: 7 % (ref 4–12)
NEUTROPHILS # BLD AUTO: 2.83 THOUSANDS/ÂΜL (ref 1.85–7.62)
NEUTS SEG NFR BLD AUTO: 57 % (ref 43–75)
NITRITE UR QL STRIP: NEGATIVE
NRBC BLD AUTO-RTO: 0 /100 WBCS
PH UR STRIP.AUTO: 6.5 [PH]
PLATELET # BLD AUTO: 278 THOUSANDS/UL (ref 149–390)
PMV BLD AUTO: 9.7 FL (ref 8.9–12.7)
POTASSIUM SERPL-SCNC: 4.3 MMOL/L (ref 3.5–5.3)
PROT SERPL-MCNC: 7.7 G/DL (ref 6.4–8.4)
PROT UR STRIP-MCNC: NEGATIVE MG/DL
RBC # BLD AUTO: 4.35 MILLION/UL (ref 3.81–5.12)
SODIUM SERPL-SCNC: 140 MMOL/L (ref 135–147)
SP GR UR STRIP.AUTO: 1.02 (ref 1–1.03)
TSH SERPL DL<=0.05 MIU/L-ACNC: 1.06 UIU/ML (ref 0.45–4.5)
UROBILINOGEN UR STRIP-ACNC: <2 MG/DL
WBC # BLD AUTO: 4.95 THOUSAND/UL (ref 4.31–10.16)

## 2023-03-01 RX ORDER — CIPROFLOXACIN 500 MG/1
500 TABLET, FILM COATED ORAL 2 TIMES DAILY
Qty: 6 TABLET | Refills: 0 | Status: SHIPPED | OUTPATIENT
Start: 2023-03-01 | End: 2023-03-04

## 2023-03-01 RX ADMIN — IOHEXOL 100 ML: 350 INJECTION, SOLUTION INTRAVENOUS at 17:41

## 2023-03-01 RX ADMIN — SODIUM CHLORIDE 1000 ML: 0.9 INJECTION, SOLUTION INTRAVENOUS at 17:51

## 2023-03-01 NOTE — DISCHARGE INSTRUCTIONS
Rest, increase fluids  Continue your current antibiotic as directed  Follow up with family doctor in 3 days for recheck or sooner if symptoms worsen  Return to ER if symptoms worsen

## 2023-03-01 NOTE — PROGRESS NOTES
Name: Brigido Olvera      : 1990      MRN: [id number]  Encounter Provider: DAJUAN Tran  Encounter Date: 3/1/2023   Encounter department: [facility]     {There are no diagnoses linked to this encounter  (Refresh or delete this SmartLink)}       Subjective      HPI     She is extremely dizzy, hard time standing     Blood culture negative  Urine culture e  coli    Review of Systems    Current Outpatient Medications on File Prior to Visit   Medication Sig   • ciprofloxacin (CIPRO) 500 mg tablet Take 1 tablet (500 mg total) by mouth every 12 (twelve) hours for 7 days   • levonorgestrel (MIRENA) 20 MCG/24HR IUD 1 each by Intrauterine route once   • Cassandra 0 25-35 MG-MCG per tablet Take 1 tablet by mouth daily   • Multiple Vitamin (MULTIVITAMIN) tablet Take 1 tablet by mouth daily Multivitamin w/ Fe   • tirzepatide (Mounjaro) 2 5 MG/0 5ML Inject 0 5 mL (2 5 mg total) under the skin every 7 days   • venlafaxine (EFFEXOR-XR) 150 mg 24 hr capsule TAKE 1 CAPSULE BY MOUTH EVERY DAY IN THE MORNING       Objective     Wt 89 8 kg (198 lb)   BMI 31 96 kg/m²     Physical Exam  DAJUAN Tran

## 2023-03-01 NOTE — Clinical Note
Eve Ghosh was seen and treated in our emergency department on 3/1/2023  Diagnosis:     Maynor Barker  may return to work on return date  She may return on this date: 03/07/2023         If you have any questions or concerns, please don't hesitate to call        Bo Workman PA-C    ______________________________           _______________          _______________  Hospital Representative                              Date                                Time

## 2023-03-01 NOTE — ASSESSMENT & PLAN NOTE
Urine culture back + e coli sensitive to cipro  Given dizziness, continued fevers, weakness would advise ER evaluation- likely requires IV fluids, electrolyte replacement possibly secondary to dehydration from 6 day hx of high fevers  Duncan Rosenberg is agreeable and will arrange childcare and friend to drive her to the ER today

## 2023-03-01 NOTE — ED PROVIDER NOTES
History  Chief Complaint   Patient presents with   • Weakness - Generalized     Pt says she was here  and was told she had a kidney infection, pt placed on keflex and then cipro on Tuesday, pt said she still has fevers, weakness, and back pain     Patient is a 34 y/o F with h/o pyelonephritis that presents to the ED with dizziness  SHe was seen in the ER on , diagnosed with pyelonephritis and given a dose of rocephin in the ER and discharged on vantin  Patient states she saw her PCP and was started on cipro  Patient states she has not had a fever today, but feels very dizzy  She denies vomiting, but has been running a fever for 6 days  She states she has had a headache for a few days  History provided by:  Patient  Dizziness  Quality:  Room spinning  Severity:  Moderate  Onset quality:  Gradual  Timing:  Constant  Progression:  Unchanged  Chronicity:  New  Relieved by:  Nothing  Worsened by: Movement  Associated symptoms: headaches and nausea    Associated symptoms: no chest pain, no diarrhea, no vomiting and no weakness    Risk factors: new medications (cipro)        Prior to Admission Medications   Prescriptions Last Dose Informant Patient Reported? Taking?    Cassandra 0 25-35 MG-MCG per tablet   Yes No   Sig: Take 1 tablet by mouth daily   Multiple Vitamin (MULTIVITAMIN) tablet   Yes No   Sig: Take 1 tablet by mouth daily Multivitamin w/ Fe   ciprofloxacin (CIPRO) 500 mg tablet   No No   Sig: Take 1 tablet (500 mg total) by mouth every 12 (twelve) hours for 7 days   levonorgestrel (MIRENA) 20 MCG/24HR IUD   Yes No   Si each by Intrauterine route once   tirzepatide (Mounjaro) 2 5 MG/0 5ML   No No   Sig: Inject 0 5 mL (2 5 mg total) under the skin every 7 days   venlafaxine (EFFEXOR-XR) 150 mg 24 hr capsule   No No   Sig: TAKE 1 CAPSULE BY MOUTH EVERY DAY IN THE MORNING      Facility-Administered Medications: None       Past Medical History:   Diagnosis Date   • Abnormal weight gain Resolved 2017    • Anemia    • Anxiety    • Bariatric surgery status    • Depression    • DUB (dysfunctional uterine bleeding)     Last assessed 2/15/2013    • Fatigue    • Fracture of left ankle    • Fracture of radius and ulna    • Hyperopia     Seen in  by Dr Mónica Rosario; no correction required at that time  • Miscarriage     Previous miscarriage during first trimester - 11 weeks    • Postsurgical malabsorption    • Varicella    • Wears glasses        Past Surgical History:   Procedure Laterality Date   •  SECTION      x2   • DILATION AND CURETTAGE OF UTERUS     • [place] EGD TRANSORAL BIOPSY SINGLE/MULTIPLE N/A 2017    Procedure: ESOPHAGOGASTRODUODENOSCOPY (EGD) with bx;  Surgeon: Edwin Galvin MD;  [facility]; Service: Bariatrics   • [place] LAPS GSTR RSTCV PX W/BYP MARIA ELENA-EN-Y LIMB <150 CM N/A 2019    Procedure: LAPAROSCOPIC MARIA ELENA-EN-Y GASTRIC BYPASS AND INTRAOPERATIVE EGD;  Surgeon: Edwin Galvin MD;  [facility];  Service: Bariatrics       Family History   Problem Relation Age of Onset   • No Known Problems Mother    • No Known Problems Father    • Lung cancer Paternal Grandfather      I have reviewed and agree with the history as documented  E-Cigarette/Vaping   • E-Cigarette Use Never User      E-Cigarette/Vaping Substances     Social History     Tobacco Use   • Smoking status: Former     Packs/day: 1 00     Years: 5 00     Pack years: 5 00     Types: Cigarettes     Quit date: 2015     Years since quittin 6   • Smokeless tobacco: Never   Vaping Use   • Vaping Use: Never used   Substance Use Topics   • Alcohol use: No   • Drug use: No       Review of Systems   Constitutional: Positive for chills, fatigue and fever  Cardiovascular: Negative for chest pain  Gastrointestinal: Positive for abdominal pain (left side) and nausea  Negative for diarrhea and vomiting  Genitourinary: Negative for dysuria     Musculoskeletal: Negative for back pain and neck pain  Skin: Negative for color change, pallor and rash  Neurological: Positive for dizziness and headaches  Negative for weakness  All other systems reviewed and are negative  Physical Exam  Physical Exam  Vitals and nursing note reviewed  Constitutional:       General: She is not in acute distress  Appearance: Normal appearance  She is well-developed, well-groomed and normal weight  She is not ill-appearing or diaphoretic  HENT:      Head: Normocephalic and atraumatic  Right Ear: External ear normal       Left Ear: External ear normal       Nose: Nose normal       Mouth/Throat:      Mouth: Mucous membranes are moist       Pharynx: Oropharynx is clear  Eyes:      Extraocular Movements: Extraocular movements intact  Conjunctiva/sclera: Conjunctivae normal       Pupils: Pupils are equal, round, and reactive to light  Cardiovascular:      Rate and Rhythm: Regular rhythm  Tachycardia present  Heart sounds: Normal heart sounds  Pulmonary:      Effort: Pulmonary effort is normal       Breath sounds: Normal breath sounds  No wheezing, rhonchi or rales  Abdominal:      General: Abdomen is flat  Bowel sounds are normal       Palpations: Abdomen is soft  Tenderness: There is abdominal tenderness in the left lower quadrant  There is right CVA tenderness and left CVA tenderness  Musculoskeletal:         General: Normal range of motion  Cervical back: Normal range of motion and neck supple  Right lower leg: No edema  Left lower leg: No edema  Skin:     General: Skin is warm and dry  Coloration: Skin is not jaundiced or pale  Findings: No rash  Neurological:      General: No focal deficit present  Mental Status: She is alert and oriented to person, place, and time  GCS: GCS eye subscore is 4  GCS verbal subscore is 5  GCS motor subscore is 6  Cranial Nerves: Cranial nerves 2-12 are intact  Motor: No weakness or tremor  Coordination: Coordination is intact  Finger-Nose-Finger Test normal    Psychiatric:         Mood and Affect: Mood normal          Behavior: Behavior is cooperative  Vital Signs  ED Triage Vitals   Temperature Pulse Respirations Blood Pressure SpO2   03/01/23 1447 03/01/23 1445 03/01/23 1445 03/01/23 1445 03/01/23 1445   97 5 °F (36 4 °C) (!) 109 18 127/57 99 %      Temp Source Heart Rate Source Patient Position - Orthostatic VS BP Location FiO2 (%)   03/01/23 1447 03/01/23 1746 03/01/23 1746 03/01/23 1746 --   Oral Right Lying Right arm       Pain Score       03/01/23 1755       5           Vitals:    03/01/23 1445 03/01/23 1746   BP: 127/57 121/58   Pulse: (!) 109 84   Patient Position - Orthostatic VS:  Lying         Visual Acuity      ED Medications  Medications   sodium chloride 0 9 % bolus 1,000 mL (0 mL Intravenous Stopped 3/1/23 1912)   iohexol (OMNIPAQUE) 350 MG/ML injection (SINGLE-DOSE) 100 mL (100 mL Intravenous Given 3/1/23 1741)       Diagnostic Studies  Results Reviewed     Procedure Component Value Units Date/Time    UA w Reflex to Microscopic w Reflex to Culture [[id number]] Collected: 03/01/23 1759    Lab Status: Final result Specimen: Urine, Clean Catch Updated: 03/01/23 1810     Color, UA Yellow     Clarity, UA Clear     Specific [place], UA 1 020     pH, UA 6 5     Leukocytes, UA Negative     Nitrite, UA Negative     Protein, UA Negative mg/dl      Glucose, UA Negative mg/dl      Ketones, UA Negative mg/dl      Urobilinogen, UA <2 0 mg/dl      Bilirubin, UA Negative     Occult Blood, UA Negative    TSH [[id number]]  (Normal) Collected: 03/01/23 1451    Lab Status: Final result Specimen: Blood from Arm, Right Updated: 03/01/23 1805     TSH 3RD GENERATON 1 057 uIU/mL     Narrative:      Patients undergoing fluorescein dye angiography may retain small amounts of fluorescein in the body for 48-72 hours post procedure  Samples containing fluorescein can produce falsely depressed TSH values   If the patient had this procedure,a specimen should be resubmitted post fluorescein clearance        Comprehensive metabolic panel [[id number]] Collected: 03/01/23 1451    Lab Status: Final result Specimen: Blood from Arm, Right Updated: 03/01/23 1514     Sodium 140 mmol/L      Potassium 4 3 mmol/L      Chloride 107 mmol/L      CO2 27 mmol/L      ANION GAP 6 mmol/L      BUN 10 mg/dL      Creatinine 0 65 mg/dL      Glucose 120 mg/dL      Calcium 9 2 mg/dL      AST 19 U/L      ALT 19 U/L      Alkaline Phosphatase 56 U/L      Total Protein 7 7 g/dL      Albumin 3 9 g/dL      Total Bilirubin 0 24 mg/dL      eGFR 118 ml/min/1 73sq m     Narrative:      National Kidney Disease Foundation guidelines for Chronic Kidney Disease (CKD):   •  Stage 1 with normal or high GFR (GFR > 90 mL/min/1 73 square meters)  •  Stage 2 Mild CKD (GFR = 60-89 mL/min/1 73 square meters)  •  Stage 3A Moderate CKD (GFR = 45-59 mL/min/1 73 square meters)  •  Stage 3B Moderate CKD (GFR = 30-44 mL/min/1 73 square meters)  •  Stage 4 Severe CKD (GFR = 15-29 mL/min/1 73 square meters)  •  Stage 5 End Stage CKD (GFR <15 mL/min/1 73 square meters)  Note: GFR calculation is accurate only with a steady state creatinine    Lipase [[id number]]  (Normal) Collected: 03/01/23 1451    Lab Status: Final result Specimen: Blood from Arm, Right Updated: 03/01/23 1514     Lipase 27 u/L     CBC and differential [[id number]] Collected: 03/01/23 1451    Lab Status: Final result Specimen: Blood from Arm, Right Updated: 03/01/23 1458     WBC 4 95 Thousand/uL      RBC 4 35 Million/uL      Hemoglobin 12 8 g/dL      Hematocrit 40 0 %      MCV 92 fL      MCH 29 4 pg      MCHC 32 0 g/dL      RDW 12 1 %      MPV 9 7 fL      Platelets 084 Thousands/uL      nRBC 0 /100 WBCs      Neutrophils Relative 57 %      Immat GRANS % 1 %      Lymphocytes Relative 33 %      Monocytes Relative 7 %      Eosinophils Relative 1 %      Basophils Relative 1 %      Neutrophils Absolute 2 83 Thousands/µL Immature Grans Absolute 0 03 Thousand/uL      Lymphocytes Absolute 1 64 Thousands/µL      Monocytes Absolute 0 36 Thousand/µL      Eosinophils Absolute 0 06 Thousand/µL      Basophils Absolute 0 03 Thousands/µL                  CT renal stone study abdomen pelvis without contrast   Final Result by Ren Balnco MD (03/01 1834)         1  Subtle left perinephric fat stranding is nonspecific  Possible inflammatory or congestive change  No evidence of nephrolithiasis or obstructive uropathy  Pyelonephritis not excluded  2   No evidence of bowel obstruction, colitis or diverticulitis  Workstation performed: [id number]         CT head without contrast   Final Result by Kristina Jim MD (03/01 1850)      No acute intracranial abnormality  Workstation performed: [id number]                    Procedures  Procedures         ED Course                               SBIRT 22yo+    Flowsheet Row Most Recent Value   SBIRT (25 yo +)    In order to provide better care to our patients, we are screening all of our patients for alcohol and drug use  Would it be okay to ask you these screening questions? Yes Filed at: 03/01/2023 1802   Initial Alcohol Screen: US AUDIT-C     1  How often do you have a drink containing alcohol? 0 Filed at: 03/01/2023 1802   2  How many drinks containing alcohol do you have on a typical day you are drinking? 0 Filed at: 03/01/2023 1802   3a  Male UNDER 65: How often do you have five or more drinks on one occasion? 0 Filed at: 03/01/2023 1802   3b  FEMALE Any Age, or MALE 65+: How often do you have 4 or more drinks on one occassion? 0 Filed at: 03/01/2023 1802   Audit-C Score 0 Filed at: 03/01/2023 5880   MORIAH: How many times in the past year have you    Used an illegal drug or used a prescription medication for non-medical reasons?  Never Filed at: 03/01/2023 1540                    Medical Decision Making  Patient with dizziness after taking cipro for acute pyelonephritis, will check labs to r/o dehydration, electrolyte abnormality, thyroid disease, anemia  Will give IV fluids and reassess  Labs improved from visit on 2/26, UA normal, CT showing inflammation surrounding left kidney c/w pyelo  Patient improved with fluids, will D/C with close f/u with PCP  Return precautions given  Patient with arachnoid cyst on CT head, no prior CT scans, advised f/u with PCP for further evaluation, possible MRI  Arachnoid cyst: acute illness or injury  Dizziness: acute illness or injury  Pyelonephritis: self-limited or minor problem  Amount and/or Complexity of Data Reviewed  Labs: ordered  Radiology: ordered  Risk  Prescription drug management  Disposition  Final diagnoses:   Dizziness   Pyelonephritis   Arachnoid cyst     Time reflects when diagnosis was documented in both MDM as applicable and the Disposition within this note     Time User Action Codes Description Comment    3/1/2023  6:56 PM Nav Louder Add [R42] Dizziness     3/1/2023  6:56 PM Nav Louder Add [N12] Pyelonephritis     3/1/2023  6:59 PM Nav Louder Add [G93 0] Arachnoid cyst       ED Disposition     ED Disposition   Discharge    Condition   Stable    Date/Time   Wed Mar 1, 2023  6:56 PM    Comment   Margie Jim discharge to home/self care                 Follow-up Information     Follow up With Specialties Details Why Contact Info Additional Information    Emily Calderon, DO Family Medicine Call in 3 days For recheck and further evaluation of arachnoid cyst [place] [phone]        [facility] Emergency Medicine Go to  If symptoms worsen [address] [place], [phone]  [address] [facility] Emergency Department, [address] [place] [address], Elvis [address]on [address]          Patient's Medications   Discharge Prescriptions CIPROFLOXACIN (CIPRO) 500 MG TABLET    Take 1 tablet (500 mg total) by mouth 2 (two) times a day for 3 days       Start Date: 3/1/2023  End Date: 3/4/2023       Order Dose: 500 mg       Quantity: 6 tablet    Refills: 0       No discharge procedures on file      PDMP Review     None          ED Provider  Electronically Signed by           Vita Enciso PA-C  03/01/23 7127

## 2023-03-01 NOTE — PROGRESS NOTES
Virtual Regular Visit    Verification of patient location:    Patient is located in the following state in which I hold an active license [place]      Assessment/Plan:    Problem List Items Addressed This Visit        Genitourinary    Pyelonephritis - Primary     Urine culture back + e coli sensitive to cipro  Given dizziness, continued fevers, weakness would advise ER evaluation- likely requires IV fluids, electrolyte replacement possibly secondary to dehydration from 6 day hx of high fevers  Raad Redding is agreeable and will arrange childcare and friend to drive her to the ER today  Reason for visit is   Chief Complaint   Patient presents with   • Follow-up     Fu from her antibiotics her fever going down day 6 she has a kidney infection she is dizzy she can not stand and have trouble word finding her words dose not come out right, she can not sit or stand and speak right    • Virtual Regular Visit        Encounter provider DAJUAN Gergorio    Provider located at [address] [place] [id number]      Recent Visits  Date Type Provider Dept   02/27/23 Telemedicine DJAUAN Gregorio Pg   Showing recent visits within past 7 days and meeting all other requirements  Today's Visits  Date Type Provider Dept   03/01/23 Telemedicine DAJUAN Gregorio Pg   Showing today's visits and meeting all other requirements  Future Appointments  No visits were found meeting these conditions  Showing future appointments within next 150 days and meeting all other requirements       The patient was identified by name and date of birth  Angelina Ervin was informed that this is a telemedicine visit and that the visit is being conducted through the [employer]  She agrees to proceed     My office door was closed  No one else was in the room    She acknowledged consent and understanding of privacy and security of the video platform  The patient has agreed to participate and understands they can discontinue the visit at any time  Patient is aware this is a billable service  Subjective  Felecia Quintero is a 33 y o  female presenting for follow up on pyelonephritis   Follow up visit, was in ER 23 for pyelonephritis  Was given IV rocephin x 1 dose and sent home on cefpodoxime  Fevers peaked and sustained 104 all weekend finally down to 101 today  Last dose of tylenol 9am  Has had episodes of diaphoresis with fever lowering but comes right back  Telehealth visit on 23 patient was feeling worse since ER visit the day prior, urine culture and blood cultures were still pending at time of visit  Switched abx to PO cipro on  based off urine culture from 2022 sensitivity  Last two days has been feeling a little better since her fevers arent as high but she is extremely dizzy, has a hard time standing, feels she is searching for words, brain feels foggy  Urine culture from  back shows e  Coli sensitive to cipro  Blood cultures x 2 negative  Past Medical History:   Diagnosis Date   • Abnormal weight gain     Resolved 2017    • Anemia    • Anxiety    • Bariatric surgery status    • Depression    • DUB (dysfunctional uterine bleeding)     Last assessed 2/15/2013    • Fatigue    • Fracture of left ankle    • Fracture of radius and ulna    • Hyperopia     Seen in  by Dr Serene Weber; no correction required at that time  • Miscarriage     Previous miscarriage during first trimester - 11 weeks    • Postsurgical malabsorption    • Varicella    • Wears glasses        Past Surgical History:   Procedure Laterality Date   •  SECTION      x2   • DILATION AND CURETTAGE OF UTERUS     • FL EGD TRANSORAL BIOPSY SINGLE/MULTIPLE N/A 2017    Procedure: ESOPHAGOGASTRODUODENOSCOPY (EGD) with bx;  Surgeon: Gurmeet Esteves MD;  Location: [facility];   Service: Bariatrics   • FL LAPS GSTR RSTCV 36 [facility] W/BYP MARIA ELENA-EN-Y LIMB <150 CM N/A 2/25/2019    Procedure: LAPAROSCOPIC MARIA ELENA-EN-Y GASTRIC BYPASS AND INTRAOPERATIVE EGD;  Surgeon: Rupert August MD;  Location: [facility];  Service: Bariatrics       Current Outpatient Medications   Medication Sig Dispense Refill   • ciprofloxacin (CIPRO) 500 mg tablet Take 1 tablet (500 mg total) by mouth every 12 (twelve) hours for 7 days 14 tablet 0   • levonorgestrel (MIRENA) 20 MCG/24HR IUD 1 each by Intrauterine route once     • Cassandra 0 25-35 MG-MCG per tablet Take 1 tablet by mouth daily     • Multiple Vitamin (MULTIVITAMIN) tablet Take 1 tablet by mouth daily Multivitamin w/ Fe     • tirzepatide (Mounjaro) 2 5 MG/0 5ML Inject 0 5 mL (2 5 mg total) under the skin every 7 days 2 mL 1   • venlafaxine (EFFEXOR-XR) 150 mg 24 hr capsule TAKE 1 CAPSULE BY MOUTH EVERY DAY IN THE MORNING 90 capsule 1     No current facility-administered medications for this visit  No Known Allergies    Review of Systems   Constitutional: Positive for activity change, appetite change, chills, diaphoresis, fatigue and fever  HENT: Negative  Respiratory: Negative  Cardiovascular: Negative  Gastrointestinal: Negative  Genitourinary: Negative  Musculoskeletal: Positive for back pain  Skin: Negative  Neurological: Positive for dizziness, syncope (Saturday night), weakness and light-headedness  Hematological: Negative  Psychiatric/Behavioral: Positive for confusion  Video Exam    Vitals:    03/01/23 0948   Weight: 89 8 kg (198 lb)       Physical Exam  Vitals and nursing note reviewed  Constitutional:       General: She is not in acute distress  Appearance: Normal appearance  She is well-developed  She is ill-appearing  HENT:      Head: Normocephalic and atraumatic  Eyes:      Conjunctiva/sclera: Conjunctivae normal    Pulmonary:      Effort: Pulmonary effort is normal  No respiratory distress     Neurological:      Mental Status: She is alert and oriented to person, place, and time  Psychiatric:         Mood and Affect: Mood normal          Behavior: Behavior normal          Thought Content:  Thought content normal          Judgment: Judgment normal           I spent 10 minutes directly with the patient during this visit

## 2023-03-03 LAB
BACTERIA BLD CULT: NORMAL
BACTERIA BLD CULT: NORMAL

## 2023-03-16 ENCOUNTER — TELEPHONE (OUTPATIENT)
Dept: FAMILY MEDICINE CLINIC | Facility: CLINIC | Age: 33
End: 2023-03-16

## 2023-03-16 NOTE — TELEPHONE ENCOUNTER
Pt called in stating that she had a CT scan of her head done and they diagnosed her with a cyst on her brain  She has an apt to follow up with Ashley Serna on Wednesday but she is wondering if she should maybe try and have an MRI done prior to the apt  Is this necessary? Please advise in Dr Centeno absence

## 2023-03-16 NOTE — TELEPHONE ENCOUNTER
We can discuss at her office visit next week to review any symptoms she may be having and how she is feeling if further imaging or referrals are needed

## 2023-03-17 DIAGNOSIS — G93.0 ARACHNOID CYST: Primary | ICD-10-CM

## 2023-03-17 NOTE — TELEPHONE ENCOUNTER
Spoke with Pt  Advised her that we would discuss at next visit  She states that [facility] told her to get an MRI because the CT scan showed the presence of her cyst but they were not able to measure it to determine it's size  That is why she was requesting the MRI  I advised her that I would relay the information and leave it up to her doctor as to if/when that order was placed

## 2023-03-22 ENCOUNTER — OFFICE VISIT (OUTPATIENT)
Dept: FAMILY MEDICINE CLINIC | Facility: CLINIC | Age: 33
End: 2023-03-22

## 2023-03-22 VITALS
HEIGHT: 66 IN | TEMPERATURE: 97.9 F | SYSTOLIC BLOOD PRESSURE: 116 MMHG | HEART RATE: 80 BPM | WEIGHT: 200 LBS | BODY MASS INDEX: 32.14 KG/M2 | OXYGEN SATURATION: 99 % | DIASTOLIC BLOOD PRESSURE: 78 MMHG

## 2023-03-22 DIAGNOSIS — Z00.00 WELL ADULT EXAM: ICD-10-CM

## 2023-03-22 DIAGNOSIS — G93.0 ARACHNOID CYST: ICD-10-CM

## 2023-03-22 DIAGNOSIS — Z13.220 SCREENING, LIPID: Primary | ICD-10-CM

## 2023-03-22 RX ORDER — NORGESTIMATE AND ETHINYL ESTRADIOL 0.25-0.035
1 KIT ORAL DAILY
COMMUNITY
Start: 2023-02-09 | End: 2024-02-09

## 2023-03-22 NOTE — PATIENT INSTRUCTIONS
Schedule mri brain without contrast  Schedule with neurology   Schedule with urology \  Lipid panel     Wellness Visit for Adults   AMBULATORY CARE:   A wellness visit  is when you see your healthcare provider to get screened for health problems  Your healthcare provider will also give you advice on how to stay healthy  Write down your questions so you remember to ask them  Ask your healthcare provider how often you should have a wellness visit  What happens at a wellness visit:  Your healthcare provider will ask about your health, and your family history of health problems  This includes high blood pressure, heart disease, and cancer  He or she will ask if you have symptoms that concern you, if you smoke, and about your mood  You may also be asked about your intake of medicines, supplements, food, and alcohol  Any of the following may be done: Your weight  will be checked  Your height may also be checked so your body mass index (BMI) can be calculated  Your BMI shows if you are at a healthy weight  Your blood pressure  and heart rate will be checked  Your temperature may also be checked  Blood and urine tests  may be done  Blood tests may be done to check your cholesterol levels  Abnormal cholesterol levels increase your risk for heart disease and stroke  You may also need a blood or urine test to check for diabetes if you are at increased risk  Urine tests may be done to look for signs of an infection or kidney disease  A physical exam  includes checking your heartbeat and lungs with a stethoscope  Your healthcare provider may also check your skin to look for sun damage  Screening tests  may be recommended  A screening test is done to check for diseases that may not cause symptoms  The screening tests you may need depend on your age, gender, family history, and lifestyle habits  For example, colorectal screening may be recommended if you are 48years old or older      Screening tests you need if you are a woman:   A Pap smear  is used to screen for cervical cancer  Pap smears are usually done every 3 to 5 years depending on your age  You may need them more often if you have had abnormal Pap smear test results in the past  Ask your healthcare provider how often you should have a Pap smear  A mammogram  is an x-ray of your breasts to screen for breast cancer  Experts recommend mammograms every 2 years starting at age 48 years  You may need a mammogram at age 52 years or younger if you have an increased risk for breast cancer  Talk to your healthcare provider about when you should start having mammograms and how often you need them  Vaccines you may need:   Get an influenza vaccine  every year  The influenza vaccine protects you from the flu  Several types of viruses cause the flu  The viruses change over time, so new vaccines are made each year  Get a tetanus-diphtheria (Td) booster vaccine  every 10 years  This vaccine protects you against tetanus and diphtheria  Tetanus is a severe infection that may cause painful muscle spasms and lockjaw  Diphtheria is a severe bacterial infection that causes a thick covering in the back of your mouth and throat  Get a human papillomavirus (HPV) vaccine  if you are female and aged 23 to 32 or male 23 to 24 and never received it  This vaccine protects you from HPV infection  HPV is the most common infection spread by sexual contact  HPV may also cause vaginal, penile, and anal cancers  Get a pneumococcal vaccine  if you are aged 72 years or older  The pneumococcal vaccine is an injection given to protect you from pneumococcal disease  Pneumococcal disease is an infection caused by pneumococcal bacteria  The infection may cause pneumonia, meningitis, or an ear infection  Get a shingles vaccine  if you are 60 or older, even if you have had shingles before  The shingles vaccine is an injection to protect you from the varicella-zoster virus   This is the same virus that causes chickenpox  Shingles is a painful rash that develops in people who had chickenpox or have been exposed to the virus  How to eat healthy:  My Plate is a model for planning healthy meals  It shows the types and amounts of foods that should go on your plate  Fruits and vegetables make up about half of your plate, and grains and protein make up the other half  A serving of dairy is included on the side of your plate  The amount of calories and serving sizes you need depends on your age, gender, weight, and height  Examples of healthy foods are listed below:  Eat a variety of vegetables  such as dark green, red, and orange vegetables  You can also include canned vegetables low in sodium (salt) and frozen vegetables without added butter or sauces  Eat a variety of fresh fruits , canned fruit in 100% juice, frozen fruit, and dried fruit  Include whole grains  At least half of the grains you eat should be whole grains  Examples include whole-wheat bread, wheat pasta, brown rice, and whole-grain cereals such as oatmeal     Eat a variety of protein foods such as seafood (fish and shellfish), lean meat, and poultry without skin (turkey and chicken)  Examples of lean meats include pork leg, shoulder, or tenderloin, and beef round, sirloin, tenderloin, and extra lean ground beef  Other protein foods include eggs and egg substitutes, beans, peas, soy products, nuts, and seeds  Choose low-fat dairy products such as skim or 1% milk or low-fat yogurt, cheese, and cottage cheese  Limit unhealthy fats  such as butter, hard margarine, and shortening  Exercise:  Exercise at least 30 minutes per day on most days of the week  Some examples of exercise include walking, biking, dancing, and swimming  You can also fit in more physical activity by taking the stairs instead of the elevator or parking farther away from stores  Include muscle strengthening activities 2 days each week   Regular exercise provides many health benefits  It helps you manage your weight, and decreases your risk for type 2 diabetes, heart disease, stroke, and high blood pressure  Exercise can also help improve your mood  Ask your healthcare provider about the best exercise plan for you  General health and safety guidelines:   Do not smoke  Nicotine and other chemicals in cigarettes and cigars can cause lung damage  Ask your healthcare provider for information if you currently smoke and need help to quit  E-cigarettes or smokeless tobacco still contain nicotine  Talk to your healthcare provider before you use these products  Limit alcohol  A drink of alcohol is 12 ounces of beer, 5 ounces of wine, or 1½ ounces of liquor  Lose weight, if needed  Being overweight increases your risk of certain health conditions  These include heart disease, high blood pressure, type 2 diabetes, and certain types of cancer  Protect your skin  Do not sunbathe or use tanning beds  Use sunscreen with a SPF 15 or higher  Apply sunscreen at least 15 minutes before you go outside  Reapply sunscreen every 2 hours  Wear protective clothing, hats, and sunglasses when you are outside  Drive safely  Always wear your seatbelt  Make sure everyone in your car wears a seatbelt  A seatbelt can save your life if you are in an accident  Do not use your cell phone when you are driving  This could distract you and cause an accident  Pull over if you need to make a call or send a text message  Practice safe sex  Use latex condoms if are sexually active and have more than one partner  Your healthcare provider may recommend screening tests for sexually transmitted infections (STIs)  Wear helmets, lifejackets, and protective gear  Always wear a helmet when you ride a bike or motorcycle, go skiing, or play sports that could cause a head injury  Wear protective equipment when you play sports   Wear a lifejacket when you are on a boat or doing water sports  © Copyright Ruven Claude 2022 Information is for End User's use only and may not be sold, redistributed or otherwise used for commercial purposes  The above information is an  only  It is not intended as medical advice for individual conditions or treatments  Talk to your doctor, nurse or pharmacist before following any medical regimen to see if it is safe and effective for you

## 2023-03-22 NOTE — PROGRESS NOTES
Subjective     Phyllis Rojo is a 33 y o   female and is here for routine health maintenance  The patient reports was hospitalized recently for kidney infection  Has had 2 episodes, has appt with urology    History of Present Illness     Pt is seen for a physical    Pt was seen in ed recently and admitted  Pt was aphasic   Will need mri after ct scan   Was out of work for 10 days  With Kidney infection- had no warning with uti symptoms  2 er visits  Had kidney infection  Has follow up with urology       Well Adult Physical   Patient here for a comprehensive physical exam       Diet and Physical Activity  Diet: well balanced diet  Weight concerns: Patient has class 1 obesity (BMI 30-34  9)  Exercise: frequently      Depression Screen  PHQ-2/9 Depression Screening    Little interest or pleasure in doing things: 0 - not at all  Feeling down, depressed, or hopeless: 1 - several days  Trouble falling or staying asleep, or sleeping too much: 1 - several days  Feeling tired or having little energy: 1 - several days  Poor appetite or overeatin - several days  Feeling bad about yourself - or that you are a failure or have let yourself or your family down: 1 - several days  Trouble concentrating on things, such as reading the newspaper or watching television: 2 - more than half the days  Moving or speaking so slowly that other people could have noticed  Or the opposite - being so fidgety or restless that you have been moving around a lot more than usual: 1 - several days  Thoughts that you would be better off dead, or of hurting yourself in some way: 0 - not at all  PHQ-9 Score: 8   PHQ-9 Interpretation: Mild depression           General Health  Hearing: Normal:  bilateral  Vision: no vision problems  Dental: regular dental visits     History:  LMP: No LMP recorded        Cancer Screening  Colononoscopy not indicated  Mammogram not indicated  Pap up to date, next   Abnormal pap? no  Smoker NO Annual screening with low-dose helical computed tomography (CT) for patients age 54 to 76 years with history of smoking at least 30 pack-years and, if a former smoker, had quit within the previous 15 years      The following portions of the patient's history were reviewed and updated as appropriate: allergies, current medications, past family history, past medical history, past social history, past surgical history and problem list     Review of Systems     Review of Systems   Constitutional: Negative  Negative for fatigue and fever  HENT: Negative  Eyes: Negative  Respiratory: Negative  Negative for cough  Cardiovascular: Negative  Gastrointestinal: Negative  Endocrine: Negative  Genitourinary: Negative  Musculoskeletal: Negative  Skin: Negative  Allergic/Immunologic: Negative  Neurological: Negative  Psychiatric/Behavioral: Negative  Past Medical History     Past Medical History:   Diagnosis Date   • Abnormal weight gain     Resolved 2017    • Anemia    • Anxiety    • Bariatric surgery status    • Depression    • DUB (dysfunctional uterine bleeding)     Last assessed 2/15/2013    • Fatigue    • Fracture of left ankle    • Fracture of radius and ulna    • Hyperopia     Seen in  by Dr Esha Rodríguez; no correction required at that time  • Miscarriage     Previous miscarriage during first trimester - 11 weeks    • Postsurgical malabsorption    • Varicella    • Wears glasses        Past Surgical History     Past Surgical History:   Procedure Laterality Date   •  SECTION      x2   • DILATION AND CURETTAGE OF UTERUS     • OR EGD TRANSORAL BIOPSY SINGLE/MULTIPLE N/A 2017    Procedure: ESOPHAGOGASTRODUODENOSCOPY (EGD) with bx;  Surgeon: Betty Cordova MD;  Location: [facility];   Service: Bariatrics   • OR LAPS GSTR RSTCV 36 [facility] W/BYP MARIA ELENA-EN-Y LIMB <150 CM N/A 2019    Procedure: LAPAROSCOPIC MARIA ELENA-EN-Y GASTRIC BYPASS AND INTRAOPERATIVE EGD;  Surgeon: Betty Cordova MD;  Location[facility];  Service: Bariatrics       Social History     Social History     Socioeconomic History   • Marital status: /Civil Union     Spouse name: None   • Number of children: None   • Years of education: None   • Highest education level: None   Occupational History   • None   Tobacco Use   • Smoking status: Former     Packs/day: 1 00     Years: 5 00     Pack years: 5 00     Types: Cigarettes     Quit date: 2015     Years since quittin 7   • Smokeless tobacco: Never   Vaping Use   • Vaping Use: Never used   Substance and Sexual Activity   • Alcohol use: No   • Drug use: No   • Sexual activity: Yes     Partners: Male     Birth control/protection: I U D     Other Topics Concern   • None   Social History Narrative    Does not exercise      Social Determinants of Health     Financial Resource Strain: Not on file   Food Insecurity: Not on file   Transportation Needs: Not on file   Physical Activity: Not on file   Stress: Not on file   Social Connections: Not on file   Intimate Partner Violence: Not on file   Housing Stability: Not on file       Family History     Family History   Problem Relation Age of Onset   • No Known Problems Mother    • No Known Problems Father    • Lung cancer Paternal Grandfather        Current Medications       Current Outpatient Medications:   •  levonorgestrel (MIRENA) 20 MCG/24HR IUD, 1 each by Intrauterine route once, Disp: , Rfl:   •  Cassandra 0 25-35 MG-MCG per tablet, Take 1 tablet by mouth daily, Disp: , Rfl:   •  Multiple Vitamin (MULTIVITAMIN) tablet, Take 1 tablet by mouth daily Multivitamin w/ Fe, Disp: , Rfl:   •  norgestimate-ethinyl estradiol (ORTHO-CYCLEN) 0 25-35 MG-MCG per tablet, Take 1 tablet by mouth daily, Disp: , Rfl:   •  tirzepatide (Mounjaro) 2 5 MG/0 5ML, Inject 0 5 mL (2 5 mg total) under the skin every 7 days, Disp: 2 mL, Rfl: 1  •  venlafaxine (EFFEXOR-XR) 150 mg 24 hr capsule, TAKE 1 CAPSULE BY MOUTH EVERY DAY IN THE MORNING, Disp: 90 "capsule, Rfl: 1     Allergies     No Known Allergies    Objective     /78   Pulse 80   Temp 97 9 °F (36 6 °C)   Ht 5' 6\" (1 676 m)   Wt 90 7 kg (200 lb)   SpO2 99%   BMI 32 28 kg/m²      Physical Exam  Vitals and nursing note reviewed  Constitutional:       Appearance: She is well-developed  HENT:      Head: Normocephalic and atraumatic  Cardiovascular:      Rate and Rhythm: Normal rate and regular rhythm  Heart sounds: Normal heart sounds  Pulmonary:      Effort: Pulmonary effort is normal       Breath sounds: Normal breath sounds  Abdominal:      General: Bowel sounds are normal       Palpations: Abdomen is soft  Skin:     General: Skin is warm and dry  Neurological:      Mental Status: She is alert and oriented to person, place, and time  Psychiatric:         Behavior: Behavior normal          Thought Content: Thought content normal          Judgment: Judgment normal            No results found      Health Maintenance     Health Maintenance   Topic Date Due   • Hepatitis C Screening  Never done   • COVID-19 Vaccine (1) Never done   • HIV Screening  Never done   • Influenza Vaccine (1) 06/30/2023 (Originally 9/1/2022)   • Depression Remission PHQ  09/22/2023   • BMI: Followup Plan  01/15/2024   • BMI: Adult  03/22/2024   • Annual Physical  03/22/2024   • Cervical Cancer Screening  04/13/2024   • DTaP,Tdap,and Td Vaccines (8 - Td or Tdap) 01/18/2028   • HIB Vaccine  Completed   • IPV Vaccine  Completed   • Pneumococcal Vaccine: Pediatrics (0 to 5 Years) and At-Risk Patients (6 to 59 Years)  Aged Out   • Hepatitis A Vaccine  Aged Out   • Meningococcal ACWY Vaccine  Aged Out   • HPV Vaccine  Aged [employer] History   Administered Date(s) Administered   • DTP 03/31/1992, 04/29/1992, 06/03/1993, 10/30/1995   • DTaP 5 01/07/1992   • Hep B, adult 11/05/1996, 09/10/1997, 12/16/1998   • Hib (PRP-OMP) 01/07/1992, 03/31/1992, 04/29/1992, 02/08/1993   • INFLUENZA 12/04/2015, " 01/14/2019, 12/03/2020   • IPV 01/07/1992, 03/31/1992, 06/03/1993, 10/30/1995   • MMR 02/08/1993, 11/05/1996   • Td (adult), adsorbed 08/05/2003   • Tdap 08/14/2013, 01/18/2018   • Tuberculin Skin Test-PPD Intradermal 08/14/2013       Assessment/Plan         1  Healthy female exam   2  Patient Counseling:   · Nutrition: Stressed importance of a well balanced diet, moderation of sodium/saturated fat, caloric balance and sufficient intake of fiber  · Exercise: Stressed the importance of regular exercise with a goal of 150 minutes per week  · Dental Health: Discussed daily flossing and brushing and regular dental visits     · Immunizations reviewed  · Discussed benefits of screening   · Discussed the patient's BMI with her  The BMI is above average; BMI management plan is completed  3  Cancer Screening   4  Labs   5  Schedule mri and neurology evaluation  Schedule with urology  6  Follow up in one year      Natty Taylor DO

## 2023-03-27 PROBLEM — Z00.00 WELL ADULT EXAM: Status: ACTIVE | Noted: 2023-03-27

## 2023-03-27 PROBLEM — Z13.220 SCREENING, LIPID: Status: ACTIVE | Noted: 2023-03-27

## 2023-03-27 PROBLEM — G93.0 ARACHNOID CYST: Status: ACTIVE | Noted: 2023-03-27

## 2023-04-24 ENCOUNTER — TELEPHONE (OUTPATIENT)
Dept: FAMILY MEDICINE CLINIC | Facility: CLINIC | Age: 33
End: 2023-04-24

## 2023-04-24 NOTE — TELEPHONE ENCOUNTER
----- Message from Macy Tamayo DO sent at 4/21/2023  6:39 PM EDT -----  The mri of the brain is ok   Shows the small cyst- benign that is unchanged in size

## 2023-04-28 PROBLEM — N12 PYELONEPHRITIS: Status: RESOLVED | Noted: 2023-02-27 | Resolved: 2023-04-28

## 2023-05-26 PROBLEM — Z13.220 SCREENING, LIPID: Status: RESOLVED | Noted: 2023-03-27 | Resolved: 2023-05-26

## 2023-05-26 PROBLEM — Z00.00 WELL ADULT EXAM: Status: RESOLVED | Noted: 2023-03-27 | Resolved: 2023-05-26

## 2023-06-14 ENCOUNTER — APPOINTMENT (EMERGENCY)
Dept: CT IMAGING | Facility: HOSPITAL | Age: 33
End: 2023-06-14
Attending: EMERGENCY MEDICINE
Payer: COMMERCIAL

## 2023-06-14 ENCOUNTER — APPOINTMENT (EMERGENCY)
Dept: ULTRASOUND IMAGING | Facility: HOSPITAL | Age: 33
End: 2023-06-14
Payer: COMMERCIAL

## 2023-06-14 ENCOUNTER — HOSPITAL ENCOUNTER (EMERGENCY)
Facility: HOSPITAL | Age: 33
Discharge: HOME | End: 2023-06-14
Attending: EMERGENCY MEDICINE
Payer: COMMERCIAL

## 2023-06-14 VITALS
HEART RATE: 87 BPM | RESPIRATION RATE: 14 BRPM | TEMPERATURE: 98 F | DIASTOLIC BLOOD PRESSURE: 75 MMHG | SYSTOLIC BLOOD PRESSURE: 122 MMHG | OXYGEN SATURATION: 100 %

## 2023-06-14 DIAGNOSIS — K80.50 BILIARY COLIC: ICD-10-CM

## 2023-06-14 DIAGNOSIS — K80.20 CHOLELITHIASIS: Primary | ICD-10-CM

## 2023-06-14 LAB
ALBUMIN SERPL BCP-MCNC: 4.3 G/DL (ref 3.5–5)
ALP SERPL-CCNC: 55 U/L (ref 34–104)
ALT SERPL W P-5'-P-CCNC: 8 U/L (ref 7–52)
ANION GAP SERPL CALCULATED.3IONS-SCNC: 6 MMOL/L (ref 4–13)
AST SERPL W P-5'-P-CCNC: 13 U/L (ref 13–39)
B-HCG SERPL-ACNC: <1 MIU/ML (ref 0–5)
BASOPHILS # BLD AUTO: 0.02 THOUSANDS/ÂΜL (ref 0–0.1)
BASOPHILS NFR BLD AUTO: 0 % (ref 0–1)
BILIRUB SERPL-MCNC: 0.6 MG/DL (ref 0.2–1)
BUN SERPL-MCNC: 9 MG/DL (ref 5–25)
CALCIUM SERPL-MCNC: 9.2 MG/DL (ref 8.4–10.2)
CHLORIDE SERPL-SCNC: 105 MMOL/L (ref 96–108)
CO2 SERPL-SCNC: 26 MMOL/L (ref 21–32)
CREAT SERPL-MCNC: 0.79 MG/DL (ref 0.6–1.3)
EOSINOPHIL # BLD AUTO: 0.02 THOUSAND/ÂΜL (ref 0–0.61)
EOSINOPHIL NFR BLD AUTO: 0 % (ref 0–6)
ERYTHROCYTE [DISTWIDTH] IN BLOOD BY AUTOMATED COUNT: 12.8 % (ref 11.6–15.1)
GFR SERPL CREATININE-BSD FRML MDRD: 100 ML/MIN/1.73SQ M
GLUCOSE SERPL-MCNC: 128 MG/DL (ref 65–140)
HCT VFR BLD AUTO: 38 % (ref 34.8–46.1)
HGB BLD-MCNC: 12.6 G/DL (ref 11.5–15.4)
IMM GRANULOCYTES # BLD AUTO: 0.02 THOUSAND/UL (ref 0–0.2)
IMM GRANULOCYTES NFR BLD AUTO: 0 % (ref 0–2)
LIPASE SERPL-CCNC: 36 U/L (ref 11–82)
LYMPHOCYTES # BLD AUTO: 1.89 THOUSANDS/ÂΜL (ref 0.6–4.47)
LYMPHOCYTES NFR BLD AUTO: 32 % (ref 14–44)
MCH RBC QN AUTO: 29.7 PG (ref 26.8–34.3)
MCHC RBC AUTO-ENTMCNC: 33.2 G/DL (ref 31.4–37.4)
MCV RBC AUTO: 90 FL (ref 82–98)
MONOCYTES # BLD AUTO: 0.56 THOUSAND/ÂΜL (ref 0.17–1.22)
MONOCYTES NFR BLD AUTO: 10 % (ref 4–12)
NEUTROPHILS # BLD AUTO: 3.37 THOUSANDS/ÂΜL (ref 1.85–7.62)
NEUTS SEG NFR BLD AUTO: 58 % (ref 43–75)
NRBC BLD AUTO-RTO: 0 /100 WBCS
PLATELET # BLD AUTO: 221 THOUSANDS/UL (ref 149–390)
PMV BLD AUTO: 9.1 FL (ref 8.9–12.7)
POTASSIUM SERPL-SCNC: 3.8 MMOL/L (ref 3.5–5.3)
PROT SERPL-MCNC: 7.5 G/DL (ref 6.4–8.4)
RBC # BLD AUTO: 4.24 MILLION/UL (ref 3.81–5.12)
SODIUM SERPL-SCNC: 137 MMOL/L (ref 135–147)
WBC # BLD AUTO: 5.88 THOUSAND/UL (ref 4.31–10.16)

## 2023-06-14 PROCEDURE — 76705 ECHO EXAM OF ABDOMEN: CPT

## 2023-06-14 PROCEDURE — 80053 COMPREHEN METABOLIC PANEL: CPT | Performed by: EMERGENCY MEDICINE

## 2023-06-14 PROCEDURE — 84702 CHORIONIC GONADOTROPIN TEST: CPT | Performed by: EMERGENCY MEDICINE

## 2023-06-14 PROCEDURE — G1004 CDSM NDSC: HCPCS

## 2023-06-14 PROCEDURE — 83690 ASSAY OF LIPASE: CPT | Performed by: EMERGENCY MEDICINE

## 2023-06-14 PROCEDURE — 36415 COLL VENOUS BLD VENIPUNCTURE: CPT | Performed by: EMERGENCY MEDICINE

## 2023-06-14 PROCEDURE — 85025 COMPLETE CBC W/AUTO DIFF WBC: CPT | Performed by: EMERGENCY MEDICINE

## 2023-06-14 PROCEDURE — 74177 CT ABD & PELVIS W/CONTRAST: CPT

## 2023-06-14 RX ORDER — ONDANSETRON 4 MG/1
4 TABLET, ORALLY DISINTEGRATING ORAL EVERY 8 HOURS PRN
Qty: 20 TABLET | Refills: 0 | Status: SHIPPED | OUTPATIENT
Start: 2023-06-14 | End: 2023-06-21

## 2023-06-14 RX ORDER — ONDANSETRON 2 MG/ML
4 INJECTION INTRAMUSCULAR; INTRAVENOUS ONCE
Status: COMPLETED | OUTPATIENT
Start: 2023-06-14 | End: 2023-06-14

## 2023-06-14 RX ADMIN — ONDANSETRON 4 MG: 2 INJECTION INTRAMUSCULAR; INTRAVENOUS at 12:50

## 2023-06-14 RX ADMIN — IOHEXOL 80 ML: 350 INJECTION, SOLUTION INTRAVENOUS at 15:34

## 2023-06-14 RX ADMIN — SODIUM CHLORIDE 1000 ML: 0.9 INJECTION, SOLUTION INTRAVENOUS at 12:46

## 2023-06-14 NOTE — ED PROVIDER NOTES
"History  Chief Complaint   Patient presents with   • Vomiting      started with pain under right shoulder blade, which is still there, then yesterday started with pain under right rib  Vomited 3 times today unable to eat food bile in color  \"I think its my gallbladder because I had gastric bypass and they said this is common\"     History from patient and medical records past medical history gastric bypass anxiety anemia presents with concern for right upper quadrant abdomen pain that radiates to her shoulder  Started 4 days ago its been intermittent but since yesterday has been constant  She vomited 3 times today mostly bile she has not been eating much because she is nauseous  She is been eating salads and does not recall eating anything bad no sick contacts or travel  No fevers but does have some sweats  Pain does get worse when she takes deep breaths  Associated diarrhea  Prior to Admission Medications   Prescriptions Last Dose Informant Patient Reported? Taking?    Cassandra 0 25-35 MG-MCG per tablet   Yes No   Sig: Take 1 tablet by mouth daily   Multiple Vitamin (MULTIVITAMIN) tablet  Self Yes No   Sig: Take 1 tablet by mouth daily Multivitamin w/ Fe   levonorgestrel (MIRENA) 20 MCG/24HR IUD   Yes No   Si each by Intrauterine route once   norgestimate-ethinyl estradiol (ORTHO-CYCLEN) 0 25-35 MG-MCG per tablet   Yes No   Sig: Take 1 tablet by mouth daily   tirzepatide (Mounjaro) 2 5 MG/0 5ML   No No   Sig: Inject 0 5 mL (2 5 mg total) under the skin every 7 days   venlafaxine (EFFEXOR-XR) 150 mg 24 hr capsule   No No   Sig: TAKE 1 CAPSULE BY MOUTH EVERY DAY IN THE MORNING      Facility-Administered Medications: None       Past Medical History:   Diagnosis Date   • Abnormal weight gain     Resolved 2017    • Anemia    • Anxiety    • Bariatric surgery status    • Depression    • DUB (dysfunctional uterine bleeding)     Last assessed 2/15/2013    • Fatigue    • Fracture of left ankle    • " Fracture of radius and ulna    • Hyperopia     Seen in  by Dr Yelena Duffy; no correction required at that time  • Miscarriage     Previous miscarriage during first trimester - 11 weeks    • Postsurgical malabsorption    • Varicella    • Wears glasses        Past Surgical History:   Procedure Laterality Date   •  SECTION      x2   • DILATION AND CURETTAGE OF UTERUS     • [place] EGD TRANSORAL BIOPSY SINGLE/MULTIPLE N/A 2017    Procedure: ESOPHAGOGASTRODUODENOSCOPY (EGD) with bx;  Surgeon: Jonathan Weber MD;  Location: [facility]; Service: Bariatrics   • MS LAPS GSTR RSTCV PX W/BYP MARIA ELENA-EN-Y LIMB <150 CM N/A 2019    Procedure: LAPAROSCOPIC MARIA ELENA-EN-Y GASTRIC BYPASS AND INTRAOPERATIVE EGD;  Surgeon: Jonathan Weber MD;  Location[facility];  Service: Bariatrics       Family History   Problem Relation Age of Onset   • No Known Problems Mother    • No Known Problems Father    • Lung cancer Paternal Grandfather      I have reviewed and agree with the history as documented  E-Cigarette/Vaping   • E-Cigarette Use Never User      E-Cigarette/Vaping Substances     Social History     Tobacco Use   • Smoking status: Former     Packs/day: 1 00     Years: 5 00     Total pack years: 5 00     Types: Cigarettes     Quit date: 2015     Years since quittin 9   • Smokeless tobacco: Never   Vaping Use   • Vaping Use: Never used   Substance Use Topics   • Alcohol use: No   • Drug use: No       Review of Systems   Constitutional: Positive for diaphoresis  Negative for chills and fever  HENT: Negative for rhinorrhea and sore throat  Respiratory: Positive for shortness of breath  Cardiovascular: Negative for chest pain  Gastrointestinal: Positive for abdominal pain, diarrhea, nausea and vomiting  Negative for constipation  Genitourinary: Negative for dysuria and frequency  Skin: Negative for rash  All other systems reviewed and are negative        Physical Exam  Physical Exam  Vitals and nursing note reviewed  Constitutional:       Appearance: She is well-developed  HENT:      Head: Normocephalic and atraumatic  Right Ear: External ear normal       Left Ear: External ear normal       Nose: Nose normal    Eyes:      Conjunctiva/sclera: Conjunctivae normal       Pupils: Pupils are equal, round, and reactive to light  Cardiovascular:      Rate and Rhythm: Normal rate and regular rhythm  Heart sounds: Normal heart sounds  Pulmonary:      Effort: Pulmonary effort is normal  No respiratory distress  Breath sounds: Normal breath sounds  No wheezing  Abdominal:      General: Bowel sounds are normal  There is no distension  Palpations: Abdomen is soft  Tenderness: There is abdominal tenderness in the right upper quadrant and right lower quadrant  There is no right CVA tenderness, left CVA tenderness, guarding or rebound  Negative signs include psoas sign and obturator sign  Musculoskeletal:         General: No deformity  Normal range of motion  Cervical back: Normal range of motion and neck supple  No spinous process tenderness  Skin:     General: Skin is warm and dry  Findings: No rash  Neurological:      General: No focal deficit present  Mental Status: She is alert  GCS: GCS eye subscore is 4  GCS verbal subscore is 5  GCS motor subscore is 6  Sensory: No sensory deficit     Psychiatric:         Mood and Affect: Mood normal          Vital Signs  ED Triage Vitals   Temperature Pulse Respirations Blood Pressure SpO2   06/14/23 1230 06/14/23 1209 06/14/23 1209 06/14/23 1209 06/14/23 1209   98 °F (36 7 °C) 90 16 148/73 100 %      Temp Source Heart Rate Source Patient Position - Orthostatic VS BP Location FiO2 (%)   06/14/23 1230 06/14/23 1209 06/14/23 1209 06/14/23 1209 --   Tympanic Monitor Sitting Right arm       Pain Score       06/14/23 1209       7           Vitals:    06/14/23 1437 06/14/23 1445 06/14/23 1500 06/14/23 1515   BP: 119/62 119/62 122/75    Pulse: 78 78 87 87   Patient Position - Orthostatic VS: Sitting - Orthostatic VS Sitting           Visual Acuity  Visual Acuity    Flowsheet Row Most Recent Value   L Pupil Size (mm) 3   R Pupil Size (mm) 3          ED Medications  Medications   sodium chloride 0 9 % bolus 1,000 mL (0 mL Intravenous Stopped 6/14/23 1346)   ondansetron (ZOFRAN) injection 4 mg (4 mg Intravenous Given 6/14/23 1250)   iohexol (OMNIPAQUE) 350 MG/ML injection (MULTI-DOSE) 80 mL (80 mL Intravenous Given 6/14/23 1534)   iohexol (OMNIPAQUE) 240 MG/ML solution 50 mL (50 mL Oral Given 6/14/23 1534)       Diagnostic Studies  Results Reviewed     Procedure Component Value Units Date/Time    hCG, quantitative, pregnancy [[id number]]  (Normal) Collected: 06/14/23 1216    Lab Status: Final result Specimen: Blood from Arm, Left Updated: 06/14/23 1247     HCG, Quant <1 mIU/mL     Narrative:       Expected Ranges:    HCG results between 5 and 25 mIU/mL may be indicative of early pregnancy but should be interpreted in light of the total clinical presentation  HCG can rise to detectable levels in nelida and post menopausal women (0-11 6 mIU/mL)       Approximate               Approximate HCG  Gestation age          Concentration ( mIU/mL)  _____________          ______________________   Pamela Anchors                      HCG values  0 2-1                       5-50  1-2                           2-3                         100-5000  3-4                         500-62927  4-5                         1000-74697  5-6                         [id number]  6-8                         [id number]  8-12                        77230-535277      Lipase [[id number]]  (Normal) Collected: 06/14/23 1216    Lab Status: Final result Specimen: Blood from Arm, Left Updated: 06/14/23 1238     Lipase 36 u/L     Comprehensive metabolic panel [[id number]] Collected: 06/14/23 1216    Lab Status: Final result Specimen: Blood from Arm, Left Updated: 06/14/23 1238 Sodium 137 mmol/L      Potassium 3 8 mmol/L      Chloride 105 mmol/L      CO2 26 mmol/L      ANION GAP 6 mmol/L      BUN 9 mg/dL      Creatinine 0 79 mg/dL      Glucose 128 mg/dL      Calcium 9 2 mg/dL      AST 13 U/L      ALT 8 U/L      Alkaline Phosphatase 55 U/L      Total Protein 7 5 g/dL      Albumin 4 3 g/dL      Total Bilirubin 0 60 mg/dL      eGFR 100 ml/min/1 73sq m     Narrative:      National Kid[employer] Disease Foundation guidelines for Chronic Kidney Disease (CKD):   •  Stage 1 with normal or high GFR (GFR > 90 mL/min/1 73 square meters)  •  Stage 2 Mild CKD (GFR = 60-89 mL/min/1 73 square meters)  •  Stage 3A Moderate CKD (GFR = 45-59 mL/min/1 73 square meters)  •  Stage 3B Moderate CKD (GFR = 30-44 mL/min/1 73 square meters)  •  Stage 4 Severe CKD (GFR = 15-29 mL/min/1 73 square meters)  •  Stage 5 End Stage CKD (GFR <15 mL/min/1 73 square meters)  Note: GFR calculation is accurate only with a steady state creatinine    CBC and differential [[id number]] Collected: 06/14/23 1216    Lab Status: Final result Specimen: Blood from Arm, Left Updated: 06/14/23 1222     WBC 5 88 Thousand/uL      RBC 4 24 Million/uL      Hemoglobin 12 6 g/dL      Hematocrit 38 0 %      MCV 90 fL      MCH 29 7 pg      MCHC 33 2 g/dL      RDW 12 8 %      MPV 9 1 fL      Platelets 344 Thousands/uL      nRBC 0 /100 WBCs      Neutrophils Relative 58 %      Immat GRANS % 0 %      Lymphocytes Relative 32 %      Monocytes Relative 10 %      Eosinophils Relative 0 %      Basophils Relative 0 %      Neutrophils Absolute 3 37 Thousands/µL      Immature Grans Absolute 0 02 Thousand/uL      Lymphocytes Absolute 1 89 Thousands/µL      Monocytes Absolute 0 56 Thousand/µL      Eosinophils Absolute 0 02 Thousand/µL      Basophils Absolute 0 02 Thousands/µL                  CT abdomen pelvis with contrast   Final Result by Veronica Mccormack MD (06/14 2282)         1  Distended gallbladder   Mild indistinctness to the gallbladder wall although no wall thickening was seen on the ultrasound the same day  2  No inflammatory changes or bowel obstruction  3  Splenomegaly  The study was marked in [facility] for immediate notification  Workstation performed: [id number]         US right upper quadrant   Final Result by Brandon Hernandez MD (06/14 1421)   Cholelithiasis with no other signs of acute cholecystitis  Additional evaluation with gallbladder as can should be considered if clinically necessary  Workstation performed: [id number]                    Procedures  Procedures         ED Course                               SBIRT 20yo+    Flowsheet Row Most Recent Value   Initial Alcohol Screen: US AUDIT-C     1  How often do you have a drink containing alcohol? 1 Filed at: 06/14/2023 1230   2  How many drinks containing alcohol do you have on a typical day you are drinking? 1 Filed at: 06/14/2023 1230   3a  Male UNDER 65: How often do you have five or more drinks on one occasion? 0 Filed at: 06/14/2023 1230   3b  FEMALE Any Age, or MALE 65+: How often do you have 4 or more drinks on one occassion? 0 Filed at: 06/14/2023 1230   Audit-C Score 2 Filed at: 06/14/2023 1230   MORIAH: How many times in the past year have you    Used an illegal drug or used a prescription medication for non-medical reasons? Never Filed at: 06/14/2023 1230                    Medical Decision Making  Differential includes biliary colic, cholelithiasis, less likely cholecystitis, gastritis, gastroenteritis, appendicitis    Amount and/or Complexity of Data Reviewed  Labs: ordered  Radiology: ordered  Risk  Prescription drug management            Disposition  Final diagnoses:   Cholelithiasis   Biliary colic     Time reflects when diagnosis was documented in both MDM as applicable and the Disposition within this note     Time User Action Codes Description Comment    6/14/2023  4:50 PM Abdon Key Add [K80 20] Cholelithiasis     6/14/2023  4:50 PM Abdon Key Add [N54 63] Biliary colic       ED Disposition     ED Disposition   Discharge    Condition   Stable    Date/Time   Wed Jun 14, 2023  4:50 PM    Comment   Cristina Hoover discharge to home/self care  Follow-up Information     Follow up With Specialties Details Why Contact Info Additional Information    [facility] Schedule an appointment as soon as possible for a visit   [address] [id number]  [place], [address], [address], [place], [place], [address]          Discharge Medication List as of 6/14/2023  4:51 PM      START taking these medications    Details   ondansetron (ZOFRAN-ODT) 4 mg disintegrating tablet Take 1 tablet (4 mg total) by mouth every 8 (eight) hours as needed for nausea or vomiting for up to 7 days, Starting Wed 6/14/2023, Until Wed 6/21/2023 at 2359, Normal         CONTINUE these medications which have NOT CHANGED    Details   levonorgestrel (MIRENA) 20 MCG/24HR IUD 1 each by Intrauterine route once, Historical Med      !! Cassandra 0 25-35 MG-MCG per tablet Take 1 tablet by mouth daily, Starting Fri 1/13/2023, Historical Med      Multiple Vitamin (MULTIVITAMIN) tablet Take 1 tablet by mouth daily Multivitamin w/ Fe, Historical Med      !! norgestimate-ethinyl estradiol (ORTHO-CYCLEN) 0 25-35 MG-MCG per tablet Take 1 tablet by mouth daily, Starting Thu 2/9/2023, Until Fri 2/9/2024, Historical Med      tirzepatide (Mounjaro) 2 5 MG/0 5ML Inject 0 5 mL (2 5 mg total) under the skin every 7 days, Starting Wed 1/11/2023, Normal      venlafaxine (EFFEXOR-XR) 150 mg 24 hr capsule TAKE 1 CAPSULE BY MOUTH EVERY DAY IN THE MORNING, Normal       !! - Potential duplicate medications found  Please discuss with provider  No discharge procedures on file      PDMP Review     None          ED Provider  Electronically Signed by           Lex Gipson DO  06/14/23 5630

## 2023-06-16 ENCOUNTER — OFFICE VISIT (OUTPATIENT)
Dept: SURGERY | Facility: HOSPITAL | Age: 33
End: 2023-06-16
Payer: COMMERCIAL

## 2023-06-16 VITALS
WEIGHT: 194.8 LBS | TEMPERATURE: 98.3 F | HEART RATE: 112 BPM | DIASTOLIC BLOOD PRESSURE: 73 MMHG | HEIGHT: 66 IN | BODY MASS INDEX: 31.31 KG/M2 | RESPIRATION RATE: 16 BRPM | SYSTOLIC BLOOD PRESSURE: 108 MMHG

## 2023-06-16 DIAGNOSIS — K80.20 GALLSTONES WITHOUT OBSTRUCTION OF GALLBLADDER: Primary | ICD-10-CM

## 2023-06-16 DIAGNOSIS — R10.11 RUQ PAIN: ICD-10-CM

## 2023-06-16 PROCEDURE — 99203 OFFICE O/P NEW LOW 30 MIN: CPT | Performed by: PHYSICIAN ASSISTANT

## 2023-06-16 RX ORDER — SODIUM CHLORIDE, SODIUM LACTATE, POTASSIUM CHLORIDE, CALCIUM CHLORIDE 600; 310; 30; 20 MG/100ML; MG/100ML; MG/100ML; MG/100ML
125 INJECTION, SOLUTION INTRAVENOUS CONTINUOUS
OUTPATIENT
Start: 2023-06-28

## 2023-06-16 RX ORDER — METRONIDAZOLE 500 MG/100ML
500 INJECTION, SOLUTION INTRAVENOUS ONCE
OUTPATIENT
Start: 2023-06-28

## 2023-06-16 RX ORDER — CEFAZOLIN SODIUM 2 G/50ML
2000 SOLUTION INTRAVENOUS ONCE
OUTPATIENT
Start: 2023-06-28

## 2023-06-16 NOTE — H&P (VIEW-ONLY)
Assessment/Plan:   Judith Ravi is a 33 y  o female who is here for RUQ Pain  Patient with few day history of right upper quadrant pain, evaluated in the ED 2 days ago  History of gastric bypass  Work-up with finding of gallstones  No findings of acute cholecystitis  Presents for evaluation  RUQ pain/symptomatic cholelithiasis versus biliary colic  -Patient continues with episodes of intermittent right upper quadrant pain, appears to be associated with eating  -Patient does have findings of gallstones on previous work-up, no findings of acute cholecystitis  -Symptoms may be related to symptomatic gallstones or bilary colic which is common after bariatric surgery and weight loss  -Discussed laparoscopic cholecystectomy  Discussed alternatively waiting and keeping a symptom log to see if symptoms continue or recur  Patient prefers to proceed with laparoscopic cholecystectomy  We will schedule for the next few weeks so patient has time to monitor symptoms  Did discuss that cholecystectomy may not fully resolve abdominal pain   -Laparoscopic cholecystectomy discussed in detail with the patient as well as possible risks and complications and written consent has been obtained  -Preadmission blood work ordered, will repeat CBC and CMP in light of continued symptoms  -No medical clearances required  -If patient develops severe constant right upper quadrant pain with associated fevers or intractable symptoms she should return to the emergency department  -If patient continues with abdominal pain following cholecystectomy she will need further work-up by bariatric surgery  -All questions answered    History of bariatric surgery  -Patient has maintained 100 pound weight loss  -Continue diet and supplementation  -Continue bariatric follow-up      HPI:  Judith Ravi is a 33 y  o female who was referred for evaluation of upper quadrant pain    She was seen in the emergency department 2 days ago with few day history of right upper quadrant pain with associated nausea and vomiting  Patient continues with intermittent episodes of right upper quadrant pain  States she tried to eat the day following her ED visit and had recurrent pain  Has been sticking to protein shakes with some improvement  She is moving her bowels  No discoloration of stools or urine  No associated fevers  No vomiting over the past few days  Work-up in the emergency room with findings of cholelithiasis without cholecystitis  Lab work was unremarkable  Patient had gastric bypass in 2019  She has maintained 100 pound weight loss  Current BMI 31  She does follow with bariatric surgery  Denies any reflux or heartburn symptoms  Prior Abdominal Surgery: Gastric bypass,  x 2   Anticoagulation: none       ROS:  General ROS: negative for - chills, fatigue, fever or night sweats, weight loss  Respiratory ROS: no cough, shortness of breath, or wheezing  Cardiovascular ROS: no chest pain or dyspnea on exertion  Abdomen ROS: as per HPI  Genito-Urinary ROS: no dysuria, trouble voiding, or hematuria  Musculoskeletal ROS: negative for - gait disturbance, joint pain or muscle pain  Neurological ROS: no TIA or stroke symptoms  Skin ROS: no rashes, lesions, open wounds    ALLERGIES  Patient has no known allergies      Current Outpatient Medications:   •  levonorgestrel (MIRENA) 20 MCG/24HR IUD, 1 each by Intrauterine route once, Disp: , Rfl:   •  Cassandra 0 25-35 MG-MCG per tablet, Take 1 tablet by mouth daily, Disp: , Rfl:   •  Multiple Vitamin (MULTIVITAMIN) tablet, Take 1 tablet by mouth daily Multivitamin w/ Fe, Disp: , Rfl:   •  venlafaxine (EFFEXOR-XR) 150 mg 24 hr capsule, TAKE 1 CAPSULE BY MOUTH EVERY DAY IN THE MORNING, Disp: 90 capsule, Rfl: 1  •  norgestimate-ethinyl estradiol (ORTHO-CYCLEN) 0 25-35 MG-MCG per tablet, Take 1 tablet by mouth daily (Patient not taking: Reported on 2023), Disp: , Rfl:   •  ondansetron (ZOFRAN-ODT) 4 mg disintegrating tablet, Take 1 tablet (4 mg total) by mouth every 8 (eight) hours as needed for nausea or vomiting for up to 7 days (Patient not taking: Reported on 2023), Disp: 20 tablet, Rfl: 0  •  tirzepatide (Mounjaro) 2 5 MG/0 5ML, Inject 0 5 mL (2 5 mg total) under the skin every 7 days (Patient not taking: Reported on 2023), Disp: 2 mL, Rfl: 1  Past Medical History:   Diagnosis Date   • Abnormal weight gain     Resolved 2017    • Anemia    • Anxiety    • Bariatric surgery status    • Depression    • DUB (dysfunctional uterine bleeding)     Last assessed 2/15/2013    • Fatigue    • Fracture of left ankle    • Fracture of radius and ulna    • Hyperopia     Seen in  by Dr Linda Almeida; no correction required at that time  • Miscarriage     Previous miscarriage during first trimester - 11 weeks    • Postsurgical malabsorption    • Varicella    • Wears glasses      Past Surgical History:   Procedure Laterality Date   •  SECTION      x2   • DILATION AND CURETTAGE OF UTERUS     • [place] EGD TRANSORAL BIOPSY SINGLE/MULTIPLE N/A 2017    Procedure: ESOPHAGOGASTRODUODENOSCOPY (EGD) with bx;  Surgeon: Katheryn Martínez MD;  Location: [facility]; Service: Bariatrics   • UT LAPS GSTR RSTCV PX W/BYP MARIA ELENA-EN-Y LIMB <150 CM N/A 2019    Procedure: LAPAROSCOPIC MARIA ELENA-EN-Y GASTRIC BYPASS AND INTRAOPERATIVE EGD;  Surgeon: Katheryn Martínez MD;  Location: [facility];  Service: Bariatrics     Family History   Problem Relation Age of Onset   • No Known Problems Mother    • No Known Problems Father    • Lung cancer Paternal Grandfather       reports that she quit smoking about 7 years ago  Her smoking use included cigarettes  She has a 5 00 pack-year smoking history  She has never used smokeless tobacco  She reports that she does not drink alcohol and does not use drugs      Exam:   Vitals:    23 1107   BP: 108/73   Pulse: (!) 112   Resp: 16   Temp: 98 3 °F (36 8 °C)     Weight (last 2 days) Date/Time Weight    06/16/23 1107 88 4 (194 8)            PHYSICAL EXAM    Vitals:    06/16/23 1107   BP: 108/73   Pulse: (!) 112   Resp: 16   Temp: 98 3 °F (36 8 °C)     Weight (last 2 days)     Date/Time Weight    06/16/23 1107 88 4 (194 8)          General Appearance:    Alert, cooperative, no distress   Head:    Normocephalic without obvious abnormality   Eyes:    Conjunctiva/corneas clear, EOM's intact        Neck:   Supple, no adenopathy, no JVD   Back:     Symmetric, no spinal or CVA tenderness   Lungs:     Clear to auscultation bilaterally, no wheezing or rhonchi   Heart:   Mild tachycardia, regular rhythm, S1 and S2 normal, no murmur   Abdomen:    Mild right upper quadrant tenderness, no rebound or guarding, no Narvaez's sign, active bowel sounds   Extremities:   Extremities normal  No clubbing, cyanosis or edema   Psych:   Normal Affect, AOx3  Neurologic:  Skin:   CNII-XII intact   Strength symmetric, speech intact    Warm, dry, intact, no visible rashes or lesions         Chandrika Sewell

## 2023-06-16 NOTE — PROGRESS NOTES
Assessment/Plan:   Sherron Marion is a 33 y  o female who is here for RUQ Pain  Patient with few day history of right upper quadrant pain, evaluated in the ED 2 days ago  History of gastric bypass  Work-up with finding of gallstones  No findings of acute cholecystitis  Presents for evaluation  RUQ pain/symptomatic cholelithiasis versus biliary colic  -Patient continues with episodes of intermittent right upper quadrant pain, appears to be associated with eating  -Patient does have findings of gallstones on previous work-up, no findings of acute cholecystitis  -Symptoms may be related to symptomatic gallstones or bilary colic which is common after bariatric surgery and weight loss  -Discussed laparoscopic cholecystectomy  Discussed alternatively waiting and keeping a symptom log to see if symptoms continue or recur  Patient prefers to proceed with laparoscopic cholecystectomy  We will schedule for the next few weeks so patient has time to monitor symptoms  Did discuss that cholecystectomy may not fully resolve abdominal pain   -Laparoscopic cholecystectomy discussed in detail with the patient as well as possible risks and complications and written consent has been obtained  -Preadmission blood work ordered, will repeat CBC and CMP in light of continued symptoms  -No medical clearances required  -If patient develops severe constant right upper quadrant pain with associated fevers or intractable symptoms she should return to the emergency department  -If patient continues with abdominal pain following cholecystectomy she will need further work-up by bariatric surgery  -All questions answered    History of bariatric surgery  -Patient has maintained 100 pound weight loss  -Continue diet and supplementation  -Continue bariatric follow-up      HPI:  Sherron Marion is a 33 y  o female who was referred for evaluation of upper quadrant pain    She was seen in the emergency department 2 days ago with few day history of right upper quadrant pain with associated nausea and vomiting  Patient continues with intermittent episodes of right upper quadrant pain  States she tried to eat the day following her ED visit and had recurrent pain  Has been sticking to protein shakes with some improvement  She is moving her bowels  No discoloration of stools or urine  No associated fevers  No vomiting over the past few days  Work-up in the emergency room with findings of cholelithiasis without cholecystitis  Lab work was unremarkable  Patient had gastric bypass in 2019  She has maintained 100 pound weight loss  Current BMI 31  She does follow with bariatric surgery  Denies any reflux or heartburn symptoms  Prior Abdominal Surgery: Gastric bypass,  x 2   Anticoagulation: none       ROS:  General ROS: negative for - chills, fatigue, fever or night sweats, weight loss  Respiratory ROS: no cough, shortness of breath, or wheezing  Cardiovascular ROS: no chest pain or dyspnea on exertion  Abdomen ROS: as per HPI  Genito-Urinary ROS: no dysuria, trouble voiding, or hematuria  Musculoskeletal ROS: negative for - gait disturbance, joint pain or muscle pain  Neurological ROS: no TIA or stroke symptoms  Skin ROS: no rashes, lesions, open wounds    ALLERGIES  Patient has no known allergies      Current Outpatient Medications:   •  levonorgestrel (MIRENA) 20 MCG/24HR IUD, 1 each by Intrauterine route once, Disp: , Rfl:   •  Cassandra 0 25-35 MG-MCG per tablet, Take 1 tablet by mouth daily, Disp: , Rfl:   •  Multiple Vitamin (MULTIVITAMIN) tablet, Take 1 tablet by mouth daily Multivitamin w/ Fe, Disp: , Rfl:   •  venlafaxine (EFFEXOR-XR) 150 mg 24 hr capsule, TAKE 1 CAPSULE BY MOUTH EVERY DAY IN THE MORNING, Disp: 90 capsule, Rfl: 1  •  norgestimate-ethinyl estradiol (ORTHO-CYCLEN) 0 25-35 MG-MCG per tablet, Take 1 tablet by mouth daily (Patient not taking: Reported on 2023), Disp: , Rfl:   •  ondansetron (ZOFRAN-ODT) 4 mg disintegrating tablet, Take 1 tablet (4 mg total) by mouth every 8 (eight) hours as needed for nausea or vomiting for up to 7 days (Patient not taking: Reported on 2023), Disp: 20 tablet, Rfl: 0  •  tirzepatide (Mounjaro) 2 5 MG/0 5ML, Inject 0 5 mL (2 5 mg total) under the skin every 7 days (Patient not taking: Reported on 2023), Disp: 2 mL, Rfl: 1  Past Medical History:   Diagnosis Date   • Abnormal weight gain     Resolved 2017    • Anemia    • Anxiety    • Bariatric surgery status    • Depression    • DUB (dysfunctional uterine bleeding)     Last assessed 2/15/2013    • Fatigue    • Fracture of left ankle    • Fracture of radius and ulna    • Hyperopia     Seen in  by Dr Luann Yancey; no correction required at that time  • Miscarriage     Previous miscarriage during first trimester - 11 weeks    • Postsurgical malabsorption    • Varicella    • Wears glasses      Past Surgical History:   Procedure Laterality Date   •  SECTION      x2   • DILATION AND CURETTAGE OF UTERUS     • AK EGD TRANSORAL BIOPSY SINGLE/MULTIPLE N/A 2017    Procedure: ESOPHAGOGASTRODUODENOSCOPY (EGD) with bx;  Surgeon: Wyatt Mandujano MD;  Location: [facility]; Service: Bariatrics   • AK LAPS GSTR RSTCV PX W/BYP MARIA ELENA-EN-Y LIMB <150 CM N/A 2019    Procedure: LAPAROSCOPIC MARIA ELENA-EN-Y GASTRIC BYPASS AND INTRAOPERATIVE EGD;  Surgeon: Wyatt Mandujano MD;  Location: [facility];  Service: Bariatrics     Family History   Problem Relation Age of Onset   • No Known Problems Mother    • No Known Problems Father    • Lung cancer Paternal Grandfather       reports that she quit smoking about 7 years ago  Her smoking use included cigarettes  She has a 5 00 pack-year smoking history  She has never used smokeless tobacco  She reports that she does not drink alcohol and does not use drugs      Exam:   Vitals:    23 1107   BP: 108/73   Pulse: (!) 112   Resp: 16   Temp: 98 3 °F (36 8 °C)     Weight (last 2 days) Date/Time Weight    06/16/23 1107 88 4 (194 8)            PHYSICAL EXAM    Vitals:    06/16/23 1107   BP: 108/73   Pulse: (!) 112   Resp: 16   Temp: 98 3 °F (36 8 °C)     Weight (last 2 days)     Date/Time Weight    06/16/23 1107 88 4 (194 8)          General Appearance:    Alert, cooperative, no distress   Head:    Normocephalic without obvious abnormality   Eyes:    Conjunctiva/corneas clear, EOM's intact        Neck:   Supple, no adenopathy, no JVD   Back:     Symmetric, no spinal or CVA tenderness   Lungs:     Clear to auscultation bilaterally, no wheezing or rhonchi   Heart:   Mild tachycardia, regular rhythm, S1 and S2 normal, no murmur   Abdomen:    Mild right upper quadrant tenderness, no rebound or guarding, no Narvaez's sign, active bowel sounds   Extremities:   Extremities normal  No clubbing, cyanosis or edema   Psych:   Normal Affect, AOx3  Neurologic:  Skin:   CNII-XII intact   Strength symmetric, speech intact    Warm, dry, intact, no visible rashes or lesions         Ruiz Sewell

## 2023-06-26 ENCOUNTER — APPOINTMENT (OUTPATIENT)
Dept: LAB | Facility: HOSPITAL | Age: 33
End: 2023-06-26
Payer: COMMERCIAL

## 2023-06-26 DIAGNOSIS — K80.20 GALLSTONES WITHOUT OBSTRUCTION OF GALLBLADDER: ICD-10-CM

## 2023-06-26 LAB
ALBUMIN SERPL BCP-MCNC: 4.5 G/DL (ref 3.5–5)
ALP SERPL-CCNC: 54 U/L (ref 34–104)
ALT SERPL W P-5'-P-CCNC: 9 U/L (ref 7–52)
ANION GAP SERPL CALCULATED.3IONS-SCNC: 5 MMOL/L
AST SERPL W P-5'-P-CCNC: 15 U/L (ref 13–39)
BASOPHILS # BLD AUTO: 0.03 THOUSANDS/ÂΜL (ref 0–0.1)
BASOPHILS NFR BLD AUTO: 1 % (ref 0–1)
BILIRUB SERPL-MCNC: 0.59 MG/DL (ref 0.2–1)
BUN SERPL-MCNC: 10 MG/DL (ref 5–25)
CALCIUM SERPL-MCNC: 9.7 MG/DL (ref 8.4–10.2)
CHLORIDE SERPL-SCNC: 103 MMOL/L (ref 96–108)
CO2 SERPL-SCNC: 29 MMOL/L (ref 21–32)
CREAT SERPL-MCNC: 0.86 MG/DL (ref 0.6–1.3)
EOSINOPHIL # BLD AUTO: 0.03 THOUSAND/ÂΜL (ref 0–0.61)
EOSINOPHIL NFR BLD AUTO: 1 % (ref 0–6)
ERYTHROCYTE [DISTWIDTH] IN BLOOD BY AUTOMATED COUNT: 12.7 % (ref 11.6–15.1)
GFR SERPL CREATININE-BSD FRML MDRD: 90 ML/MIN/1.73SQ M
GLUCOSE SERPL-MCNC: 137 MG/DL (ref 65–140)
HCT VFR BLD AUTO: 39.8 % (ref 34.8–46.1)
HGB BLD-MCNC: 13.1 G/DL (ref 11.5–15.4)
IMM GRANULOCYTES # BLD AUTO: 0.03 THOUSAND/UL (ref 0–0.2)
IMM GRANULOCYTES NFR BLD AUTO: 1 % (ref 0–2)
LYMPHOCYTES # BLD AUTO: 1.46 THOUSANDS/ÂΜL (ref 0.6–4.47)
LYMPHOCYTES NFR BLD AUTO: 24 % (ref 14–44)
MCH RBC QN AUTO: 29.6 PG (ref 26.8–34.3)
MCHC RBC AUTO-ENTMCNC: 32.9 G/DL (ref 31.4–37.4)
MCV RBC AUTO: 90 FL (ref 82–98)
MONOCYTES # BLD AUTO: 0.5 THOUSAND/ÂΜL (ref 0.17–1.22)
MONOCYTES NFR BLD AUTO: 8 % (ref 4–12)
NEUTROPHILS # BLD AUTO: 4.14 THOUSANDS/ÂΜL (ref 1.85–7.62)
NEUTS SEG NFR BLD AUTO: 65 % (ref 43–75)
NRBC BLD AUTO-RTO: 0 /100 WBCS
PLATELET # BLD AUTO: 247 THOUSANDS/UL (ref 149–390)
PMV BLD AUTO: 9.1 FL (ref 8.9–12.7)
POTASSIUM SERPL-SCNC: 4.6 MMOL/L (ref 3.5–5.3)
PROT SERPL-MCNC: 7.8 G/DL (ref 6.4–8.4)
RBC # BLD AUTO: 4.43 MILLION/UL (ref 3.81–5.12)
SODIUM SERPL-SCNC: 137 MMOL/L (ref 135–147)
WBC # BLD AUTO: 6.19 THOUSAND/UL (ref 4.31–10.16)

## 2023-06-26 PROCEDURE — 80053 COMPREHEN METABOLIC PANEL: CPT

## 2023-06-26 PROCEDURE — 85025 COMPLETE CBC W/AUTO DIFF WBC: CPT

## 2023-06-26 PROCEDURE — 36415 COLL VENOUS BLD VENIPUNCTURE: CPT

## 2023-06-26 NOTE — PRE-PROCEDURE INSTRUCTIONS
Pre-Surgery Instructions:   Medication Instructions   • levonorgestrel (MIRENA) 20 MCG/24HR IUD implant   • Multiple Vitamin (MULTIVITAMIN) tablet Stop taking 1 day prior to surgery  • ondansetron (ZOFRAN-ODT) 4 mg disintegrating tablet Uses PRN- OK to take day of surgery   • venlafaxine (EFFEXOR-XR) 150 mg 24 hr capsule Take day of surgery   Medication instructions for day surgery reviewed  Please use only a sip of water to take your instructed medications  Avoid all over the counter vitamins, supplements and NSAIDS for one week prior to surgery per anesthesia guidelines  Tylenol is ok to take as needed  You will receive a call one business day prior to surgery with an arrival time and hospital directions  If your surgery is scheduled on a Monday, the hospital will be calling you on the Friday prior to your surgery  If you have not heard from anyone by 8pm, please call the hospital supervisor through the hospital  at [phone]  Ayanna Shaikh Do not eat or drink anything after midnight the night before your surgery, including candy, mints, lifesavers, or chewing gum  Do not drink alcohol 24hrs before your surgery  Try not to smoke at least 24hrs before your surgery  Follow the pre surgery showering instructions as listed in the [facility] Surgical Experience Booklet” or otherwise provided by your surgeon's office  Do not shave the surgical area 24 hours before surgery  Do not apply any lotions, creams, including makeup, cologne, deodorant, or perfumes after showering on the day of your surgery  No contact lenses, eye make-up, or artificial eyelashes  Remove nail polish, including gel polish, and any artificial, gel, or acrylic nails if possible  Remove all jewelry including rings and body piercing jewelry  Wear causal clothing that is easy to take on and off  Consider your type of surgery  Keep any valuables, jewelry, piercings at home   Please bring any specially ordered equipment (sling, braces) if indicated  Arrange for a responsible person to drive you to and from the hospital on the day of your surgery  Visitor Guidelines discussed  Call the surgeon's office with any new illnesses, exposures, or additional questions prior to surgery  Please reference your [facility] Experience Booklet” for additional information to prepare for your upcoming surgery

## 2023-06-27 ENCOUNTER — ANESTHESIA EVENT (OUTPATIENT)
Dept: PERIOP | Facility: HOSPITAL | Age: 33
End: 2023-06-27
Payer: COMMERCIAL

## 2023-06-28 ENCOUNTER — HOSPITAL ENCOUNTER (OUTPATIENT)
Facility: HOSPITAL | Age: 33
Setting detail: OUTPATIENT SURGERY
Discharge: HOME | End: 2023-06-28
Attending: SURGERY | Admitting: SURGERY
Payer: COMMERCIAL

## 2023-06-28 ENCOUNTER — ANESTHESIA (OUTPATIENT)
Dept: PERIOP | Facility: HOSPITAL | Age: 33
End: 2023-06-28
Payer: COMMERCIAL

## 2023-06-28 VITALS
HEART RATE: 70 BPM | SYSTOLIC BLOOD PRESSURE: 114 MMHG | HEIGHT: 66 IN | TEMPERATURE: 97.9 F | DIASTOLIC BLOOD PRESSURE: 55 MMHG | RESPIRATION RATE: 28 BRPM | WEIGHT: 189.9 LBS | OXYGEN SATURATION: 100 % | BODY MASS INDEX: 30.52 KG/M2

## 2023-06-28 DIAGNOSIS — Z90.49 S/P LAPAROSCOPIC CHOLECYSTECTOMY: Primary | ICD-10-CM

## 2023-06-28 DIAGNOSIS — K81.9 CHOLECYSTITIS: ICD-10-CM

## 2023-06-28 PROBLEM — R11.2 PONV (POSTOPERATIVE NAUSEA AND VOMITING): Status: ACTIVE | Noted: 2023-06-28

## 2023-06-28 PROBLEM — Z98.890 PONV (POSTOPERATIVE NAUSEA AND VOMITING): Status: ACTIVE | Noted: 2023-06-28

## 2023-06-28 LAB
EXT PREGNANCY TEST URINE: NEGATIVE
EXT. CONTROL: NORMAL

## 2023-06-28 PROCEDURE — 88304 TISSUE EXAM BY PATHOLOGIST: CPT | Performed by: STUDENT IN AN ORGANIZED HEALTH CARE EDUCATION/TRAINING PROGRAM

## 2023-06-28 PROCEDURE — 81025 URINE PREGNANCY TEST: CPT | Performed by: SURGERY

## 2023-06-28 PROCEDURE — 47562 LAPAROSCOPIC CHOLECYSTECTOMY: CPT | Performed by: SURGERY

## 2023-06-28 PROCEDURE — 47562 LAPAROSCOPIC CHOLECYSTECTOMY: CPT | Performed by: PHYSICIAN ASSISTANT

## 2023-06-28 RX ORDER — PROPOFOL 10 MG/ML
INJECTION, EMULSION INTRAVENOUS AS NEEDED
Status: DISCONTINUED | OUTPATIENT
Start: 2023-06-28 | End: 2023-06-28

## 2023-06-28 RX ORDER — FENTANYL CITRATE/PF 50 MCG/ML
25 SYRINGE (ML) INJECTION
Status: DISCONTINUED | OUTPATIENT
Start: 2023-06-28 | End: 2023-06-28 | Stop reason: HOSPADM

## 2023-06-28 RX ORDER — DEXAMETHASONE SODIUM PHOSPHATE 10 MG/ML
INJECTION, SOLUTION INTRAMUSCULAR; INTRAVENOUS AS NEEDED
Status: DISCONTINUED | OUTPATIENT
Start: 2023-06-28 | End: 2023-06-28

## 2023-06-28 RX ORDER — SODIUM CHLORIDE, SODIUM LACTATE, POTASSIUM CHLORIDE, CALCIUM CHLORIDE 600; 310; 30; 20 MG/100ML; MG/100ML; MG/100ML; MG/100ML
125 INJECTION, SOLUTION INTRAVENOUS CONTINUOUS
Status: DISCONTINUED | OUTPATIENT
Start: 2023-06-28 | End: 2023-06-28 | Stop reason: HOSPADM

## 2023-06-28 RX ORDER — OXYCODONE HYDROCHLORIDE 5 MG/1
5 TABLET ORAL EVERY 6 HOURS PRN
Qty: 12 TABLET | Refills: 0 | Status: SHIPPED | OUTPATIENT
Start: 2023-06-28 | End: 2023-07-01

## 2023-06-28 RX ORDER — METRONIDAZOLE 500 MG/100ML
500 INJECTION, SOLUTION INTRAVENOUS ONCE
Status: COMPLETED | OUTPATIENT
Start: 2023-06-28 | End: 2023-06-28

## 2023-06-28 RX ORDER — ROCURONIUM BROMIDE 10 MG/ML
INJECTION, SOLUTION INTRAVENOUS AS NEEDED
Status: DISCONTINUED | OUTPATIENT
Start: 2023-06-28 | End: 2023-06-28

## 2023-06-28 RX ORDER — MIDAZOLAM HYDROCHLORIDE 2 MG/2ML
INJECTION, SOLUTION INTRAMUSCULAR; INTRAVENOUS AS NEEDED
Status: DISCONTINUED | OUTPATIENT
Start: 2023-06-28 | End: 2023-06-28

## 2023-06-28 RX ORDER — ONDANSETRON 2 MG/ML
4 INJECTION INTRAMUSCULAR; INTRAVENOUS ONCE AS NEEDED
Status: COMPLETED | OUTPATIENT
Start: 2023-06-28 | End: 2023-06-28

## 2023-06-28 RX ORDER — SODIUM CHLORIDE 9 MG/ML
INJECTION, SOLUTION INTRAVENOUS CONTINUOUS PRN
Status: DISCONTINUED | OUTPATIENT
Start: 2023-06-28 | End: 2023-06-28

## 2023-06-28 RX ORDER — KETOROLAC TROMETHAMINE 30 MG/ML
INJECTION, SOLUTION INTRAMUSCULAR; INTRAVENOUS AS NEEDED
Status: DISCONTINUED | OUTPATIENT
Start: 2023-06-28 | End: 2023-06-28

## 2023-06-28 RX ORDER — HYDROMORPHONE HCL IN WATER/PF 6 MG/30 ML
0.2 PATIENT CONTROLLED ANALGESIA SYRINGE INTRAVENOUS
Status: DISCONTINUED | OUTPATIENT
Start: 2023-06-28 | End: 2023-06-28 | Stop reason: HOSPADM

## 2023-06-28 RX ORDER — ONDANSETRON 2 MG/ML
INJECTION INTRAMUSCULAR; INTRAVENOUS AS NEEDED
Status: DISCONTINUED | OUTPATIENT
Start: 2023-06-28 | End: 2023-06-28

## 2023-06-28 RX ORDER — GLYCOPYRROLATE 0.2 MG/ML
INJECTION INTRAMUSCULAR; INTRAVENOUS AS NEEDED
Status: DISCONTINUED | OUTPATIENT
Start: 2023-06-28 | End: 2023-06-28

## 2023-06-28 RX ORDER — OXYCODONE HYDROCHLORIDE 5 MG/1
10 TABLET ORAL EVERY 4 HOURS PRN
Status: DISCONTINUED | OUTPATIENT
Start: 2023-06-28 | End: 2023-06-28 | Stop reason: HOSPADM

## 2023-06-28 RX ORDER — ONDANSETRON 2 MG/ML
4 INJECTION INTRAMUSCULAR; INTRAVENOUS EVERY 6 HOURS PRN
Status: DISCONTINUED | OUTPATIENT
Start: 2023-06-28 | End: 2023-06-28 | Stop reason: HOSPADM

## 2023-06-28 RX ORDER — ACETAMINOPHEN 325 MG/1
650 TABLET ORAL EVERY 6 HOURS PRN
Qty: 60 TABLET | Refills: 0
Start: 2023-06-28

## 2023-06-28 RX ORDER — LIDOCAINE HYDROCHLORIDE 10 MG/ML
INJECTION, SOLUTION EPIDURAL; INFILTRATION; INTRACAUDAL; PERINEURAL AS NEEDED
Status: DISCONTINUED | OUTPATIENT
Start: 2023-06-28 | End: 2023-06-28

## 2023-06-28 RX ORDER — LIDOCAINE HYDROCHLORIDE 10 MG/ML
0.5 INJECTION, SOLUTION EPIDURAL; INFILTRATION; INTRACAUDAL; PERINEURAL ONCE AS NEEDED
Status: DISCONTINUED | OUTPATIENT
Start: 2023-06-28 | End: 2023-06-28 | Stop reason: HOSPADM

## 2023-06-28 RX ORDER — ACETAMINOPHEN 325 MG/1
650 TABLET ORAL EVERY 6 HOURS PRN
Status: DISCONTINUED | OUTPATIENT
Start: 2023-06-28 | End: 2023-06-28 | Stop reason: HOSPADM

## 2023-06-28 RX ORDER — NEOSTIGMINE METHYLSULFATE 1 MG/ML
INJECTION INTRAVENOUS AS NEEDED
Status: DISCONTINUED | OUTPATIENT
Start: 2023-06-28 | End: 2023-06-28

## 2023-06-28 RX ORDER — DIPHENHYDRAMINE HYDROCHLORIDE 50 MG/ML
12.5 INJECTION INTRAMUSCULAR; INTRAVENOUS ONCE AS NEEDED
Status: DISCONTINUED | OUTPATIENT
Start: 2023-06-28 | End: 2023-06-28 | Stop reason: HOSPADM

## 2023-06-28 RX ORDER — OXYCODONE HYDROCHLORIDE 5 MG/1
5 TABLET ORAL EVERY 4 HOURS PRN
Status: DISCONTINUED | OUTPATIENT
Start: 2023-06-28 | End: 2023-06-28 | Stop reason: HOSPADM

## 2023-06-28 RX ORDER — CEFAZOLIN SODIUM 2 G/50ML
2000 SOLUTION INTRAVENOUS ONCE
Status: COMPLETED | OUTPATIENT
Start: 2023-06-28 | End: 2023-06-28

## 2023-06-28 RX ORDER — FENTANYL CITRATE 50 UG/ML
INJECTION, SOLUTION INTRAMUSCULAR; INTRAVENOUS AS NEEDED
Status: DISCONTINUED | OUTPATIENT
Start: 2023-06-28 | End: 2023-06-28

## 2023-06-28 RX ADMIN — SODIUM CHLORIDE: 0.9 INJECTION, SOLUTION INTRAVENOUS at 07:38

## 2023-06-28 RX ADMIN — ROCURONIUM BROMIDE 10 MG: 10 INJECTION, SOLUTION INTRAVENOUS at 07:53

## 2023-06-28 RX ADMIN — ONDANSETRON 4 MG: 2 INJECTION INTRAMUSCULAR; INTRAVENOUS at 07:35

## 2023-06-28 RX ADMIN — ONDANSETRON 4 MG: 2 INJECTION INTRAMUSCULAR; INTRAVENOUS at 09:18

## 2023-06-28 RX ADMIN — CEFAZOLIN SODIUM 2000 MG: 2 SOLUTION INTRAVENOUS at 07:35

## 2023-06-28 RX ADMIN — METRONIDAZOLE: 500 INJECTION, SOLUTION INTRAVENOUS at 07:35

## 2023-06-28 RX ADMIN — ROCURONIUM BROMIDE 40 MG: 10 INJECTION, SOLUTION INTRAVENOUS at 07:35

## 2023-06-28 RX ADMIN — OXYCODONE HYDROCHLORIDE 5 MG: 5 TABLET ORAL at 09:22

## 2023-06-28 RX ADMIN — FENTANYL CITRATE 25 MCG: 50 INJECTION, SOLUTION INTRAMUSCULAR; INTRAVENOUS at 08:44

## 2023-06-28 RX ADMIN — GLYCOPYRROLATE 0.8 MG: 0.2 INJECTION, SOLUTION INTRAMUSCULAR; INTRAVENOUS at 08:13

## 2023-06-28 RX ADMIN — MIDAZOLAM 2 MG: 1 INJECTION INTRAMUSCULAR; INTRAVENOUS at 07:30

## 2023-06-28 RX ADMIN — PROPOFOL 200 MG: 10 INJECTION, EMULSION INTRAVENOUS at 07:35

## 2023-06-28 RX ADMIN — FENTANYL CITRATE 50 MCG: 50 INJECTION, SOLUTION INTRAMUSCULAR; INTRAVENOUS at 07:35

## 2023-06-28 RX ADMIN — FENTANYL CITRATE 50 MCG: 50 INJECTION, SOLUTION INTRAMUSCULAR; INTRAVENOUS at 07:53

## 2023-06-28 RX ADMIN — KETOROLAC TROMETHAMINE 15 MG: 30 INJECTION, SOLUTION INTRAMUSCULAR; INTRAVENOUS at 08:15

## 2023-06-28 RX ADMIN — LIDOCAINE HYDROCHLORIDE 50 MG: 10 INJECTION, SOLUTION EPIDURAL; INFILTRATION; INTRACAUDAL; PERINEURAL at 07:35

## 2023-06-28 RX ADMIN — DEXAMETHASONE SODIUM PHOSPHATE 10 MG: 10 INJECTION, SOLUTION INTRAMUSCULAR; INTRAVENOUS at 07:35

## 2023-06-28 RX ADMIN — NEOSTIGMINE METHYLSULFATE 4 MG: 1 INJECTION INTRAVENOUS at 08:13

## 2023-06-28 NOTE — ANESTHESIA PREPROCEDURE EVALUATION
Procedure:  CHOLECYSTECTOMY LAPAROSCOPIC (Abdomen)    Relevant Problems   ANESTHESIA   (+) PONV (postoperative nausea and vomiting)      CARDIO   (+) Mixed hyperlipidemia      GI/HEPATIC   (-) Gastroesophageal reflux disease      GYN (within normal limits)   (-) Currently pregnant      HEMATOLOGY  emotional wake up      NEURO/PSYCH   (+) Anxiety   (+) Mild episode of recurrent major depressive disorder (HCC)      PULMONARY (within normal limits)   (-) Sleep apnea   (-) Smoking   (-) URI (upper respiratory infection)      Other   (+) Bariatric surgery status    BMI 30    Physical Exam    Airway    Mallampati score: I  TM Distance: >3 FB  Neck ROM: full     Dental   No notable dental hx     Cardiovascular      Pulmonary      Other Findings       Lab Results   Component Value Date    WBC 6 19 06/26/2023    HGB 13 1 06/26/2023     06/26/2023     Lab Results   Component Value Date    SODIUM 137 06/26/2023    K 4 6 06/26/2023    BUN 10 06/26/2023    CREATININE 0 86 06/26/2023    EGFR 90 06/26/2023     Lab Results   Component Value Date    PTT 40 (H) 02/26/2023      Lab Results   Component Value Date    INR 1 08 02/26/2023     Anesthesia Plan  ASA Score- 2     Anesthesia Type- general with ASA Monitors  Additional Monitors:   Airway Plan: ETT  Plan Factors-Exercise tolerance (METS): >4 METS  Chart reviewed  Existing labs reviewed  Patient summary reviewed  Patient is not a current smoker  Induction- intravenous  Postoperative Plan-     Informed Consent- Anesthetic plan and risks discussed with patient  I personally reviewed this patient with the CRNA  Discussed and agreed on the Anesthesia Plan with the CRNA  Samara Machado

## 2023-06-28 NOTE — ANESTHESIA POSTPROCEDURE EVALUATION
Post-Op Assessment Note    CV Status:  Stable  Pain Score: 0    Pain management: adequate  Multimodal analgesia used between 6 hours prior to anesthesia start to PACU discharge    Mental Status:  Alert and awake   Hydration Status:  Euvolemic and stable   PONV Controlled:  Controlled   Airway Patency:  Patent   Two or more mitigation strategies used for obstructive sleep apnea   Post Op Vitals Reviewed: Yes      Staff: CRNA         No notable events documented      /83 (06/28/23 0827)    Temp 98 1 °F (36 7 °C) (06/28/23 0827)    Pulse 98 (06/28/23 0827)   Resp   12   SpO2 100 % (06/28/23 0827)

## 2023-06-28 NOTE — DISCHARGE INSTR - AVS FIRST PAGE
[facility] Instructions  Dr Derrell Awan MD, [facility]  [phone]    1  General: You will feel pulling sensations around the wound or funny aches and pains around the incisions  This is normal  Even minor surgery is a change in your body and this is your body's way of reacting to it  If you have had abdominal surgery, it may help to support the incision with a small pillow or blanket for comfort when moving or coughing  2  Wound care:  Okay to shower  The glue will fall off over the next week or 2  Use ice for the first 5 days after surgery  Do not use for longer than 20 minutes at a time  Use ice 5 times per day  3  Water: You may shower over the wounds  Do not bathe or use a pool or hot tub until cleared by the physician  If you were discharged with a drain, make sure drain site is covered with plastic wrap before showering  4  Activity: You may go up and down stairs, walk as much as you are comfortable, but walk at least 3 times each day  If you have had abdominal surgery, do not lift anything heavier than 15 lbs for the 1st 2 weeks and 25 lbs for weeks 3 and 4      5  Diet: You may resume a regular low-fat diet  If you had a same-day surgery or overnight stay surgery, you may wish to eat lightly for a few days: soups, crackers, and sandwiches  You may resume a regular diet when ready  6  Medications: Resume all of your previous medications, unless told otherwise by the doctor  Avoid aspirin products for 2-3 days after the date of surgery  You may, at that time, begin to take them again  Use Tylenol and Ibuprofen for pain control  You may alternate these medications every 3 hours  For example: you may take Tylenol at noon, Ibuprofen at 3:00 p m , and Tylenol again at 6:00 p m , etc   You should use ice to assist with pain control as above  You do not need to take narcotic pain medication unless you are having significant pain         7  Driving: You will need someone to drive you home on the day of surgery or discharge  Do not drive or make any important decisions while on narcotic pain medication or 24 hours and after anesthesia or sedation for surgery  Generally, you may drive when your off all narcotic pain medications and you are comfortable  8  Upset Stomach: You may take Maalox, Tums, or similar items for an upset stomach  If your narcotic pain medication causes an upset stomach, do not take it on an empty stomach  Try taking it with at least some crackers or toast      9  Constipation: Patients often experience constipation after surgery  You may take over-the-counter medication for this, such as Metamucil, Senokot, Dulcolax, milk of magnesia, etc  You may take a suppository unless you have had anorectal surgery such as a procedure on your hemorrhoids  If you experience significant nausea or vomiting after abdominal surgery, call the office before trying any of these medications  10  Call the office: If you are experiencing any of the following: fevers above 101 5°, significant nausea or vomiting, if the wound develops drainage and/or there is excessive redness around the wound, or if you have significant diarrhea or other worsening symptoms  11  Pain: You may be given a prescription for pain medication  This will be sent to your pharmacy prior to discharge  12  Sexual Activity: You may resume sexual activity when you feel ready and comfortable and your incision is sealed and healed without apparent infection risk  13  Urination: If you have not urinated in 6 hours, go directly to the ER for evaluation for urinary retention  14  Follow-up in 2 weeks

## 2023-06-28 NOTE — OP NOTE
CHOLECYSTECTOMY LAPAROSCOPIC  Postoperative Note  PATIENT NAME: Emily Cabrera  : 1990  MRN: [id number]  [facility] OR ROOM 01    Surgery Date: 2023    Pre-op Dx:  Cholecystitis [K81 9]    Post-Op Diagnosis Codes:     * Cholecystitis [K81 9]    Procedure(s):  CHOLECYSTECTOMY LAPAROSCOPIC    Surgeon(s) and Role:     * Josh Tariq MD - Primary     * Timur Smiley PA-C - Assisting    The Physician Assistant was medically necessary for surgical safety the case including suturing, retraction, and hemostasis  Specimens:  ID Type Source Tests Collected by Time Destination   1 : galbladder  Tissue Gallbladder TISSUE EXAM Josh Tariq MD 2023 [id number]        Estimated Blood Loss:   Minimal    Anesthesia Type:   General     Procedure: The patient was seen again in the Holding Room  The risks, benefits, complications, treatment options, and expected outcomes were discussed with the patient  The possibilities of reaction to medication, pulmonary aspiration, perforation of viscus, bleeding, recurrent infection, finding a normal gallbladder, the need for additional procedures, failure to diagnose a condition, the possible need to convert to an open procedure, and creating a complication requiring transfusion or operation were discussed with the patient  The patient and/or family concurred with the proposed plan, giving informed consent  The site of surgery properly noted/marked  The patient was taken to Operating Room, identified as Emily Cabrera  and the procedure verified as Laparoscopic Cholecystectomy with possible Intraoperative Cholangiogram  A Time Out was held after prepping and draping in sterile fashion  The above information was confirmed  Prior to the induction of general anesthesia, antibiotic prophylaxis was administered  General endotracheal anesthesia was then administered and tolerated well  After the induction, the abdomen was prepped in the usual sterile fashion   The patient was "positioned in the supine position, along with some reverse Trendelenburg  Local anesthetic agent was injected into the skin near the umbilicus and an incision made  The midline fascia was incised and the open technique was used to introduce a  port under direct vision  Pneumoperitoneum was then created with CO2 and tolerated well without any adverse changes in the patient's vital signs  Additional trocars were introduced under direct vision  All skin incisions were infiltrated with a local anesthetic agent before making the incision and placing the trocars  The patient was placed in the head up, left side down position  The gallbladder was identified, the fundus grasped and retracted cephalad and laterally  Adhesions were lysed bluntly and with the electrocautery or harmonic scapel  where indicated, taking care not to injure any adjacent organs or viscus  The infundibulum was grasped and retracted laterally, exposing the peritoneum overlying the triangle of Calot  This was then dissected anteriorily and posteriorly from the gallbladder,  and exposed in a blunt fashion or using cautery or harmonic scapel where appropriate  The cystic duct was clearly identified and  dissected circumferentially, as was the cystic artery, as the only two tubular structures leading into the gallbladder   The critical view of the [address] was identified and opened  The posterior aspect of the gallbladder was lifted off the cystic plate, to insure that there were no posterior structres behind the gallbladder or leading into the liver  Once this was all clearly identified, the cystic duct was then doubly ligated with surgical clips and/or Endoloop suture on the patient side and singly clipped on the gallbladder side and divided  The cystic artery was re-identified,  ligated with clips and divided as well   If necessary, the cystic duct was \"miked\" back of any stones felt in the cystic duct, prior to clipping the " patient side of the duct  The gallbladder was dissected from the liver bed in retrograde fashion with the electrocautery and/or harmonic scalpel where appropriate  The gallbladder was removed, via an endopouch, through the umbilical port  The liver bed was irrigated and inspected  Hemostasis was achieved with the electrocautery  Copious irrigation was utilized and was repeatedly aspirated until clear  Pneumoperitoneum was completely reduced after viewing removal of the trocars under direct vision  The wound was thoroughly irrigated and any fascia defect was then closed with a figure of eight suture 0-vicryl; the skin was then closed with 4-0 monocryl and a sterile dressings were applied  Instrument, sponge, and needle counts were correct at closure and at the conclusion of the case  This text is generated with voice recognition software  There may be translation, syntax,  or grammatical errors  If you have any questions, please contact the dictating provider       Complications: None    Condition: Stable to PACU    SIGNATURE: Merlin Cuello MD   DATE: June 28, 2023   TIME: 8:16 AM

## 2023-06-28 NOTE — INTERIM OP NOTE
CHOLECYSTECTOMY LAPAROSCOPIC  Postoperative Note  PATIENT NAME: Pako Macdonald  : 1990  MRN: [id number]  [facility] OR ROOM 01    Surgery Date: 2023    Preop Diagnosis:  Cholecystitis [K81 9]    Post-Op Diagnosis Codes:     * Cholecystitis [K81 9]    Procedure(s) (LRB):  CHOLECYSTECTOMY LAPAROSCOPIC (N/A)    Surgeon(s) and Role:     * Megan Joaquin MD - Primary     Jcarlos Sewell PA-C - Assisting    Specimens:  ID Type Source Tests Collected by Time Destination   1 : galbladder  Tissue Gallbladder TISSUE EXAM Megan Joaquin MD 2023 [id number]        Estimated Blood Loss:   Minimal ETA    Anesthesia Type:   General ETA    Findings:   GB adhesions  Complications:   None      SIGNATURE: Laurita Sewell PA-C   DATE: 2023   TIME: 8:30 AM

## 2023-06-28 NOTE — INTERVAL H&P NOTE
H&P reviewed  After examining the patient I find no changes in the patients condition since the H&P had been written      Vitals:    06/28/23 0649   BP: 121/68   Pulse: 89   Resp: 16   Temp: 97 8 °F (36 6 °C)   SpO2: 100%
Detail Level: Generalized

## 2023-07-05 PROCEDURE — 88304 TISSUE EXAM BY PATHOLOGIST: CPT | Performed by: STUDENT IN AN ORGANIZED HEALTH CARE EDUCATION/TRAINING PROGRAM

## 2023-07-11 NOTE — PROGRESS NOTES
Assessment/Plan:   Cathy Jacob is a 33 y. o.female who comes in today for postoperative check after laparoscopic cholecystectomy done on 6/28/2023 for symptomatic cholelithiasis. Patient is feeling well. Denies abdominal pain. No recurrence of right upper quadrant pain symptoms. She has returned to normal diet. Denies any nausea or vomiting. Has noted episodes of diarrhea. She was constipated prior to procedure. No fevers or chills. She is following lifting restrictions. On exam patient's abdomen is benign. Incision sites are healing well. Small amount of surgical glue remains at incision sites. Pathology: Gallbladder, cholecystectomy:   Gallbladder with cholelithiasis. No findings of cell atypia or malignancy. Patient does not require further surgical follow-up. She should continue diet as tolerated. She should stick to bland, low-fat diet until issues with diarrhea improve. Usually self-limited and improves over time. She should continue to refrain from strenuous activities and lift nothing greater that 25 lbs until 07/28/2023. At that time she may return to activities without restrictions. She may remove remaining surgical glue from incision sites as it lifts from skin edges. She should continue to call with changes, questions, or concerns. She should call if diarrhea does not improve. HPI:  Cathy Jacob is a 33 y. o.female who comes in today for postoperative check after recent laparoscopic cholecystectomy. Currently doing well, no fever or chills,no nausea and no vomiting. Patient reports they have resumed their normal diet. admits to biliary diarrhea. Previously had issues with constipation. Now has loose bowel movements a few times a day. Denies watery diarrhea. Reports no abdominal pain. She is following lifting and activity restrictions.     ROS:  General ROS: negative for - chills, fatigue, fever or night sweats, weight loss  Respiratory ROS: no cough, shortness of breath, or wheezing  Cardiovascular ROS: no chest pain or dyspnea on exertion  Abdomen ROS: as per HPI  Genito-Urinary ROS: no dysuria, trouble voiding, or hematuria  Musculoskeletal ROS: negative for - gait disturbance, joint pain or muscle pain  Neurological ROS: no TIA or stroke symptoms  Skin ROS: surgical incision sites    ALLERGIES  Patient has no known allergies. Current Outpatient Medications:   •  acetaminophen (TYLENOL) 325 mg tablet, Take 2 tablets (650 mg total) by mouth every 6 (six) hours as needed for mild pain or moderate pain, Disp: 60 tablet, Rfl: 0  •  levonorgestrel (MIRENA) 20 MCG/24HR IUD, 1 each by Intrauterine route once, Disp: , Rfl:   •  Multiple Vitamin (MULTIVITAMIN) tablet, Take 1 tablet by mouth daily Multivitamin w/ Fe, Disp: , Rfl:   •  ondansetron (ZOFRAN-ODT) 4 mg disintegrating tablet, Take 1 tablet (4 mg total) by mouth every 8 (eight) hours as needed for nausea or vomiting for up to 7 days, Disp: 20 tablet, Rfl: 0  •  tirzepatide (Mounjaro) 2.5 MG/0.5ML, Inject 0.5 mL (2.5 mg total) under the skin every 7 days (Patient not taking: Reported on 2023), Disp: 2 mL, Rfl: 1  •  venlafaxine (EFFEXOR-XR) 150 mg 24 hr capsule, TAKE 1 CAPSULE BY MOUTH EVERY DAY IN THE MORNING, Disp: 90 capsule, Rfl: 1  Past Medical History:   Diagnosis Date   • Abnormal weight gain     Resolved 2017    • Anemia    • Anxiety    • Bariatric surgery status    • COVID 2023   • Depression    • DUB (dysfunctional uterine bleeding)     Last assessed 2/15/2013    • Fatigue    • Fracture of left ankle    • Fracture of radius and ulna    • Hyperopia     Seen in  by Dr. Prosper Reece; no correction required at that time.     • Miscarriage     Previous miscarriage during first trimester - 11 weeks    • Postsurgical malabsorption    • Varicella    • Wears glasses      Past Surgical History:   Procedure Laterality Date   •  SECTION      x2   • CHOLECYSTECTOMY LAPAROSCOPIC N/A 6/28/2023    Procedure: CHOLECYSTECTOMY LAPAROSCOPIC;  Surgeon: Jacqueline Simmons MD;  Location: [facility];  Service: General   • DILATION AND CURETTAGE OF UTERUS     • ND EGD TRANSORAL BIOPSY SINGLE/MULTIPLE N/A 7/5/2017    Procedure: ESOPHAGOGASTRODUODENOSCOPY (EGD) with bx;  Surgeon: Faisal Crum MD;  Location: [facility]; Service: Bariatrics   • ND LAPS GSTR RSTCV PX W/BYP MARIA ELENA-EN-Y LIMB <150 CM N/A 2/25/2019    Procedure: LAPAROSCOPIC MARIA ELENA-EN-Y GASTRIC BYPASS AND INTRAOPERATIVE EGD;  Surgeon: Faisal Crum MD;  Location: [facility];  Service: Bariatrics     Family History   Problem Relation Age of Onset   • No Known Problems Mother    • No Known Problems Father    • Lung cancer Paternal Grandfather       reports that she quit smoking about 8 years ago. Her smoking use included cigarettes. She has a 5.00 pack-year smoking history. She has never used smokeless tobacco. She reports that she does not drink alcohol and does not use drugs.     PHYSICAL EXAM    Vitals:    07/14/23 1001   BP: 101/67   Pulse: 69     Weight (last 2 days)     Date/Time Weight    07/14/23 1001 86.7 (191.2)          General: normal, cooperative, no distress  Abdominal: soft, nondistended or nontender  Incision: clean, dry, and intact and healing well    Benjamin Sewell

## 2023-07-14 ENCOUNTER — OFFICE VISIT (OUTPATIENT)
Dept: SURGERY | Facility: HOSPITAL | Age: 33
End: 2023-07-14

## 2023-07-14 VITALS
WEIGHT: 191.2 LBS | HEIGHT: 66 IN | HEART RATE: 69 BPM | SYSTOLIC BLOOD PRESSURE: 101 MMHG | DIASTOLIC BLOOD PRESSURE: 67 MMHG | BODY MASS INDEX: 30.73 KG/M2

## 2023-07-14 DIAGNOSIS — Z98.890 POST-OPERATIVE STATE: Primary | ICD-10-CM

## 2023-07-14 PROCEDURE — 99024 POSTOP FOLLOW-UP VISIT: CPT | Performed by: PHYSICIAN ASSISTANT

## 2023-08-16 ENCOUNTER — APPOINTMENT (OUTPATIENT)
Dept: URGENT CARE | Facility: MEDICAL CENTER | Age: 33
End: 2023-08-16

## 2023-08-16 PROCEDURE — 86765 RUBEOLA ANTIBODY: CPT | Performed by: PHYSICIAN ASSISTANT

## 2023-08-16 PROCEDURE — 86480 TB TEST CELL IMMUN MEASURE: CPT | Performed by: PHYSICIAN ASSISTANT

## 2023-08-16 PROCEDURE — 86762 RUBELLA ANTIBODY: CPT | Performed by: PHYSICIAN ASSISTANT

## 2023-08-16 PROCEDURE — 86787 VARICELLA-ZOSTER ANTIBODY: CPT | Performed by: PHYSICIAN ASSISTANT

## 2023-08-16 PROCEDURE — 86735 MUMPS ANTIBODY: CPT | Performed by: PHYSICIAN ASSISTANT

## 2023-08-16 PROCEDURE — 86706 HEP B SURFACE ANTIBODY: CPT | Performed by: PHYSICIAN ASSISTANT

## 2023-09-07 ENCOUNTER — TELEPHONE (OUTPATIENT)
Dept: FAMILY MEDICINE CLINIC | Facility: CLINIC | Age: 33
End: 2023-09-07

## 2023-09-07 NOTE — TELEPHONE ENCOUNTER
Hello, my name is Alek Hutchison. I am calling to see if I could get a copy of my T DAP vaccine that I had gotten in 2018 for a new employment. My YOB: 1990. Again, my name is Alek Hutchison and my phone number is [phone]. If you could please give me a call back, I would really appreciate it. Thanks.

## 2024-01-17 ENCOUNTER — HOSPITAL ENCOUNTER (OUTPATIENT)
Dept: RADIOLOGY | Facility: HOSPITAL | Age: 34
Discharge: HOME | End: 2024-01-17
Payer: COMMERCIAL

## 2024-01-17 ENCOUNTER — OFFICE VISIT (OUTPATIENT)
Dept: FAMILY MEDICINE CLINIC | Facility: CLINIC | Age: 34
End: 2024-01-17
Payer: COMMERCIAL

## 2024-01-17 VITALS
SYSTOLIC BLOOD PRESSURE: 126 MMHG | OXYGEN SATURATION: 98 % | HEIGHT: 66 IN | HEART RATE: 88 BPM | DIASTOLIC BLOOD PRESSURE: 78 MMHG | BODY MASS INDEX: 33.59 KG/M2 | WEIGHT: 209 LBS | TEMPERATURE: 97.3 F

## 2024-01-17 DIAGNOSIS — G89.29 CHRONIC PAIN OF LEFT KNEE: Primary | ICD-10-CM

## 2024-01-17 DIAGNOSIS — G89.29 CHRONIC PAIN OF LEFT KNEE: ICD-10-CM

## 2024-01-17 DIAGNOSIS — M25.562 CHRONIC PAIN OF LEFT KNEE: Primary | ICD-10-CM

## 2024-01-17 DIAGNOSIS — M25.562 CHRONIC PAIN OF LEFT KNEE: ICD-10-CM

## 2024-01-17 PROCEDURE — 99214 OFFICE O/P EST MOD 30 MIN: CPT | Performed by: NURSE PRACTITIONER

## 2024-01-17 PROCEDURE — 73564 X-RAY EXAM KNEE 4 OR MORE: CPT

## 2024-01-17 NOTE — PROGRESS NOTES
Name: Hina Munoz      : 1990      MRN: [id number]  Encounter Provider: DAJUAN Peralta  Encounter Date: 2024   Encounter department: [facility]    Assessment & Plan     1. Chronic pain of left knee  Assessment & Plan:  Check xray  Start PT  If symptoms persist will order MRI and ortho eval    Orders:  -     Ambulatory Referral to Physical Therapy; Future  -     XR knee 4+ vw left injury; Future; Expected date: 2024         Subjective      Back in 2023 her 5 year old jumped onto her left knee while it was extended out on the ottoman. Feels her knee is locking and she has to pop crack it to release it. Favoring the left leg because she is walking abnormally. 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 Hasn't noticed much swelling in the knee. NSAIDs help temporarily but she has to limit it due to bariatric surgery. Has to constantly re position to make it feel better.           Review of Systems   Constitutional: Negative.    Respiratory: Negative.     Cardiovascular: Negative.    Gastrointestinal: Negative.    Musculoskeletal:  Positive for arthralgias (left knee pain) and gait problem. Negative for joint swelling.   Skin: Negative.    Neurological:  Negative for weakness and numbness.   Hematological: Negative.        Current Outpatient Medications on File Prior to Visit   Medication Sig   • acetaminophen (TYLENOL) 325 mg tablet Take 2 tablets (650 mg total) by mouth every 6 (six) hours as needed for mild pain or moderate pain   • IBUPROFEN PO Take by mouth   • levonorgestrel (MIRENA) 20 MCG/24HR IUD 1 each by Intrauterine route once   • Multiple Vitamin (MULTIVITAMIN) tablet Take 1 tablet by mouth daily Multivitamin w/ Fe   • [DISCONTINUED] ondansetron (ZOFRAN-ODT) 4 mg disintegrating tablet Take 1 tablet (4 mg total) by mouth every 8 (eight) hours as needed for nausea or vomiting for up to 7 days   • [DISCONTINUED] tirzepatide (Mounjaro) 2.5 MG/0.5ML Inject  "0.5 mL (2.5 mg total) under the skin every 7 days (Patient not taking: Reported on 6/16/2023)   • [DISCONTINUED] venlafaxine (EFFEXOR-XR) 150 mg 24 hr capsule TAKE 1 CAPSULE BY MOUTH EVERY DAY IN THE MORNING       Objective     /78   Pulse 88   Temp (!) 97.3 °F (36.3 °C)   Ht 5' 6\" (1.676 m)   Wt 94.8 kg (209 lb)   SpO2 98%   BMI 33.73 kg/m²     Physical Exam  Vitals reviewed.   Constitutional:       General: She is not in acute distress.     Appearance: Normal appearance.   HENT:      Head: Normocephalic.   Cardiovascular:      Rate and Rhythm: Normal rate and regular rhythm.      Heart sounds: Normal heart sounds.   Pulmonary:      Effort: No respiratory distress.      Breath sounds: Normal breath sounds.   Abdominal:      General: Bowel sounds are normal.      Palpations: Abdomen is soft.      Tenderness: There is no abdominal tenderness.   Musculoskeletal:      Right hip: Normal.      Left hip: Tenderness present. Decreased range of motion: left bursa.      Left knee: No swelling, effusion or erythema. Normal range of motion. Tenderness present over the patellar tendon.      Instability Tests: Anterior drawer test negative. Posterior drawer test negative. Anterior Lachman test negative. Medial Sanjay test negative and lateral Sanjay test negative.      Right lower leg: Normal. No edema.      Left lower leg: Normal. No edema.   Neurological:      Mental Status: She is alert.       DAJUAN Peralta    "

## 2024-01-17 NOTE — RESULT ENCOUNTER NOTE
Prasanna Santamaria, Your knee xray is normal recommend starting physical therapy, if no improvement will consider ortho consult and MRI

## 2024-01-18 ENCOUNTER — TELEPHONE (OUTPATIENT)
Dept: FAMILY MEDICINE CLINIC | Facility: CLINIC | Age: 34
End: 2024-01-18

## 2024-01-18 NOTE — TELEPHONE ENCOUNTER
----- Message from DAJUAN Peralta sent at 1/17/2024  4:21 PM EST -----  Prasanna Santamaria, Your knee xray is normal recommend starting physical therapy, if no improvement will consider ortho consult and MRI

## 2024-01-22 ENCOUNTER — EVALUATION (OUTPATIENT)
Dept: PHYSICAL THERAPY | Facility: CLINIC | Age: 34
End: 2024-01-22
Payer: COMMERCIAL

## 2024-01-22 DIAGNOSIS — G89.29 CHRONIC PAIN OF LEFT KNEE: ICD-10-CM

## 2024-01-22 DIAGNOSIS — M25.562 CHRONIC PAIN OF LEFT KNEE: ICD-10-CM

## 2024-01-22 PROCEDURE — 97161 PT EVAL LOW COMPLEX 20 MIN: CPT | Performed by: PHYSICAL THERAPIST

## 2024-01-22 NOTE — PROGRESS NOTES
PT Evaluation     Today's date: 2024  Patient name: Hina Munoz  : 1990  MRN: [id number]  Referring provider: Tish King, *  Dx:   Encounter Diagnosis     ICD-10-CM    1. Chronic pain of left knee  M25.562 Ambulatory Referral to Physical Therapy    G89.29                      Assessment  Assessment details: Pt is a 33 y.o. female presenting to PT with chronic L knee pain after hyperextension injury of the L knee. PT findings include: strength deficits in knee flexors, slight strength deficits of proximal L LE muscles. Ligamentous tests were unremarkable. However, subjective complaints of clicking concerning for potential meniscus injury. Pt educated on PT findings, anatomy, biomechanics, POC and rehab course. Pt will benefit from skilled PT to address above impairments to return to PLOF with less restriction and to reach functional goals.   Impairments: abnormal gait, abnormal or restricted ROM, lacks appropriate home exercise program and pain with function    Symptom irritability: lowUnderstanding of Dx/Px/POC: good   Prognosis: good    Goals  1. Pt will demonstrate understanding of HEP and POC in order to improve compliance with course of rehab in 2 weeks.  2. Pt's knee ROM will improve to WNL to allow improved transfers with less knee loads in 4 weeks.   3. Pt's knee flex/ext MMT will improve to 5/5 to allow pt to negotiate stairs with less restriction by d/c.   4. Pt's pain will be 2/10 or less to allow pt to return to PLOF with least restriction by d/c.     Plan  Patient would benefit from: skilled physical therapy  Planned modality interventions: low level laser therapy  Planned therapy interventions: manual therapy, neuromuscular re-education, patient education, strengthening, stretching, therapeutic activities, therapeutic exercise, home exercise program, functional ROM exercises and flexibility  Frequency: 2x week  Duration in weeks: 6  Treatment plan discussed with:  "patient      Subjective Evaluation    History of Present Illness  Mechanism of injury: Pt arrives to PT via referral from family medicine for chronic L knee pain. In summer 2023, her 5 year old jumped onto L knee that was extended/propped up on coffee table which hyperextended the knee. Pt reports general discomfort in the L knee and when she \"cracks/pops it\" by bending the knee she feels relief. 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     Pt had been participating in workout classes which she had to stop due to discomfort. During the daytime with work, she states that pain is manageable at a 2/10 pain, however with trying to sleep, she will have increased discomfort which impedes ability to sleep.   Quality of life: good    Patient Goals  Patient goals for therapy: decreased pain, increased motion, increased strength and independence with ADLs/IADLs    Pain  Current pain ratin  At best pain ratin  At worst pain ratin  Location: L knee  Quality: discomfort and tight  Relieving factors: relaxation, support and rest          Objective    Knee ROM:  Flex: 135°/132°  Ext: 5°/5°    Strength:  Knee ext: 5/5  Knee flex: 4/5 (discomfort)  Hip flex: 5/5  Hip ER: 5/5  Hip ABD: 4+/5    Effusion sweep test: 0    Special tests:  Anterior drawer: negative  Posterior drawer: negative  Lachmans: negative  Varus stress: negative  Valgus stress: negative  Patellar compression: negative       Precautions: None      Manuals             Laser                                                     Neuro Re-Ed                                                                                                        Ther Ex             bridge HEP            SLR HEP            S/l hip abduction HEP            Standing HS curl Grn TB HEP            U/l heel raise HEP            Prone hip ext             Bridge HS curl             Lunge HS curl             Ther Activity             Bike             Step " up/over Trial grn step            Gait Training                                       Modalities

## 2024-01-29 ENCOUNTER — APPOINTMENT (OUTPATIENT)
Dept: PHYSICAL THERAPY | Facility: CLINIC | Age: 34
End: 2024-01-29
Payer: COMMERCIAL

## 2024-01-31 ENCOUNTER — OFFICE VISIT (OUTPATIENT)
Dept: PHYSICAL THERAPY | Facility: CLINIC | Age: 34
End: 2024-01-31
Payer: COMMERCIAL

## 2024-01-31 DIAGNOSIS — M25.562 CHRONIC PAIN OF LEFT KNEE: Primary | ICD-10-CM

## 2024-01-31 DIAGNOSIS — G89.29 CHRONIC PAIN OF LEFT KNEE: Primary | ICD-10-CM

## 2024-01-31 PROCEDURE — 97110 THERAPEUTIC EXERCISES: CPT | Performed by: PHYSICAL THERAPIST

## 2024-01-31 PROCEDURE — 97530 THERAPEUTIC ACTIVITIES: CPT | Performed by: PHYSICAL THERAPIST

## 2024-01-31 NOTE — PROGRESS NOTES
"Daily Note     Today's date: 2024  Patient name: Hina Munoz  : 1990  MRN: [id number]  Referring provider: Tish King, *  Dx:   Encounter Diagnosis     ICD-10-CM    1. Chronic pain of left knee  M25.562     G89.29                      Subjective: Pt reports that since performing her home exercises, she feels like the knee is feeling better. She reports that during exercises will have some discomfort but feeling like the knee gets less \"stuck\".       Objective: See treatment diary below      Assessment: Pt tolerates progressions in exercises and additions with no increased pain. Initiated air squats and added to HEP instructing pt to go to comfortable depth with goal to squat with increased knee flexion for purpose of introducing controlled loads to knee and quad. Pt will benefit from continued skilled PT.       Plan: Continue per plan of care.      Precautions: None      Manuals            Laser   DL 10W 4'                                                   Neuro Re-Ed                                                                                                        Ther Ex             bridge HEP 20x 5\"           SLR HEP 30x           S/l hip abduction HEP 20x           Standing HS curl Grn TB HEP Grn 2x10            U/l heel raise HEP 2x10           Prone hip ext  30x           Bridge HS curl   NV per ability           LAQ  10# 20x 5\"           Lunge HS curl  W/ slider Red 15x Trial Grn          Ther Activity             Bike  5'           Air Squat  10x slow eccentric, full depth if possible           Step up/over Trial grn step NV            Gait Training                                       Modalities                                            "

## 2024-02-07 ENCOUNTER — OFFICE VISIT (OUTPATIENT)
Dept: PHYSICAL THERAPY | Facility: CLINIC | Age: 34
End: 2024-02-07
Payer: COMMERCIAL

## 2024-02-07 DIAGNOSIS — G89.29 CHRONIC PAIN OF LEFT KNEE: Primary | ICD-10-CM

## 2024-02-07 DIAGNOSIS — M25.562 CHRONIC PAIN OF LEFT KNEE: Primary | ICD-10-CM

## 2024-02-07 PROCEDURE — 97530 THERAPEUTIC ACTIVITIES: CPT | Performed by: PHYSICAL THERAPIST

## 2024-02-07 PROCEDURE — 97110 THERAPEUTIC EXERCISES: CPT | Performed by: PHYSICAL THERAPIST

## 2024-02-07 NOTE — PROGRESS NOTES
"Daily Note     Today's date: 2024  Patient name: Hina Munoz  : 1990  MRN: [id number]  Referring provider: Tish King, *  Dx:   Encounter Diagnosis     ICD-10-CM    1. Chronic pain of left knee  M25.562     G89.29                      Subjective: Pt reports that she has been working and it has been bothering her more recently. But she still notes that compared to before PT, it is much improved.       Objective: See treatment diary below      Assessment: Pt tolerates treatment progressions today with mild discomfort. Pt with fatigue post treatment indicating achieving therapeutic dosage. Educated pt to continue with squat depth progressions and did add step down progressions. Pt will benefit from continued skilled PT.       Plan: Continue per plan of care.      Precautions: None      Manuals           Laser   DL 10W 4'  DL 10W 4'                                                  Neuro Re-Ed                                                                                                        Ther Ex             bridge HEP 20x 5\"           SLR HEP 30x 1# 3x10          S/l hip abduction HEP 20x 1# 3x10          Standing HS curl Grn TB HEP Grn 2x10  Grn 2x10          U/l heel raise HEP 2x10 3x10          Prone hip ext  30x 30x          Bridge HS curl   2x10 pball           LAQ  10# 20x 5\" 10# 20x 5\"          Lunge HS curl  W/ slider Red 15x Trial Grn          Ther Activity             Bike  5' 8'          Air Squat  10x slow eccentric, full depth if possible 15x           Step up/over Trial grn step NV  Grn step 15x slow ecc.          Lunge   Comfortable range, slow descent NV          Gait Training                                       Modalities                                              "

## 2024-02-21 ENCOUNTER — OFFICE VISIT (OUTPATIENT)
Dept: PHYSICAL THERAPY | Facility: CLINIC | Age: 34
End: 2024-02-21
Payer: COMMERCIAL

## 2024-02-21 DIAGNOSIS — G89.29 CHRONIC PAIN OF LEFT KNEE: Primary | ICD-10-CM

## 2024-02-21 DIAGNOSIS — M25.562 CHRONIC PAIN OF LEFT KNEE: Primary | ICD-10-CM

## 2024-02-21 PROCEDURE — 97110 THERAPEUTIC EXERCISES: CPT | Performed by: PHYSICAL THERAPIST

## 2024-02-21 PROCEDURE — 97530 THERAPEUTIC ACTIVITIES: CPT | Performed by: PHYSICAL THERAPIST

## 2024-02-21 NOTE — PROGRESS NOTES
"Discharge    Today's date: 2024  Patient name: Hina Munoz  : 1990  MRN: [id number]  Referring provider: Tish King, *  Dx:   Encounter Diagnosis     ICD-10-CM    1. Chronic pain of left knee  M25.562     G89.29                      Subjective: Pt reports that her knee has been doing much better. She states being able to go to the gym. She participated in exercise class today involving a lot of squats/lunges. She states that pain is rated at 4/10 at worst. Pt feels comfortable with exercise program, is okay with transitioning to independent strengthening.      Objective: See treatment diary below      Assessment: Pt has made significant functional gains per FOTO score. FOTO score improvements are consistent with subjective improvements. Pt educated on need to continue strengthening posterior chain as pt has returned to classes that emphasize a lot of quad strengthening. Provided suggestions for posterior chain exercises. Pt is appropriate for discharge from skilled PT.       Plan: Continue per plan of care.      Precautions: None      Manuals          Laser   DL 10W 4'  DL 10W 4'                                                  Neuro Re-Ed                                                                                                        Ther Ex             bridge HEP 20x 5\"           SLR HEP 30x 1# 3x10          S/l hip abduction HEP 20x 1# 3x10 1# 3x10         Standing HS curl Grn TB HEP Grn 2x10  Grn 2x10 Blue 3x10         U/l heel raise HEP 2x10 3x10 3x10         Prone hip ext  30x 30x 3x10          Donkey kick    2x10         Bridge HS curl   2x10 pball  3x10 pball         Bridge march    10x alternating         LAQ  10# 20x 5\" 10# 20x 5\"          Lunge HS curl  W/ slider Red 15x Trial Grn          Ther Activity             Bike  5' 8' 8'         Air Squat  10x slow eccentric, full depth if possible 15x  10# goblet squat 2x10         Step up/over Trial grn step NV "  Grn step 15x slow ecc.          Lunge   Comfortable range, slow descent NV          Turkmen split squat    2x10          Gait Training                                       Modalities

## 2024-02-23 ENCOUNTER — APPOINTMENT (EMERGENCY)
Dept: CT IMAGING | Facility: HOSPITAL | Age: 34
End: 2024-02-23
Payer: COMMERCIAL

## 2024-02-23 ENCOUNTER — HOSPITAL ENCOUNTER (EMERGENCY)
Facility: HOSPITAL | Age: 34
Discharge: HOME | End: 2024-02-23
Attending: EMERGENCY MEDICINE
Payer: COMMERCIAL

## 2024-02-23 VITALS
OXYGEN SATURATION: 98 % | DIASTOLIC BLOOD PRESSURE: 57 MMHG | SYSTOLIC BLOOD PRESSURE: 110 MMHG | RESPIRATION RATE: 18 BRPM | HEART RATE: 81 BPM | TEMPERATURE: 97.7 F

## 2024-02-23 DIAGNOSIS — R10.9 ABDOMINAL PAIN: Primary | ICD-10-CM

## 2024-02-23 DIAGNOSIS — R16.1 SPLENOMEGALY: ICD-10-CM

## 2024-02-23 LAB
ALBUMIN SERPL BCP-MCNC: 4.2 G/DL (ref 3.5–5)
ALP SERPL-CCNC: 55 U/L (ref 34–104)
ALT SERPL W P-5'-P-CCNC: 10 U/L (ref 7–52)
ANION GAP SERPL CALCULATED.3IONS-SCNC: 4 MMOL/L
AST SERPL W P-5'-P-CCNC: 14 U/L (ref 13–39)
BACTERIA UR QL AUTO: ABNORMAL /HPF
BASOPHILS # BLD AUTO: 0.01 THOUSANDS/ÂΜL (ref 0–0.1)
BASOPHILS NFR BLD AUTO: 0 % (ref 0–1)
BILIRUB SERPL-MCNC: 0.4 MG/DL (ref 0.2–1)
BILIRUB UR QL STRIP: NEGATIVE
BUN SERPL-MCNC: 12 MG/DL (ref 5–25)
CALCIUM SERPL-MCNC: 9.4 MG/DL (ref 8.4–10.2)
CHLORIDE SERPL-SCNC: 108 MMOL/L (ref 96–108)
CLARITY UR: CLEAR
CO2 SERPL-SCNC: 27 MMOL/L (ref 21–32)
COLOR UR: YELLOW
CREAT SERPL-MCNC: 0.77 MG/DL (ref 0.6–1.3)
EOSINOPHIL # BLD AUTO: 0.05 THOUSAND/ÂΜL (ref 0–0.61)
EOSINOPHIL NFR BLD AUTO: 1 % (ref 0–6)
ERYTHROCYTE [DISTWIDTH] IN BLOOD BY AUTOMATED COUNT: 12.6 % (ref 11.6–15.1)
EXT PREGNANCY TEST URINE: NEGATIVE
EXT. CONTROL: NORMAL
GFR SERPL CREATININE-BSD FRML MDRD: 102 ML/MIN/1.73SQ M
GLUCOSE SERPL-MCNC: 92 MG/DL (ref 65–140)
GLUCOSE UR STRIP-MCNC: NEGATIVE MG/DL
HCT VFR BLD AUTO: 37.6 % (ref 34.8–46.1)
HETEROPH AB SER QL: NEGATIVE
HGB BLD-MCNC: 12.5 G/DL (ref 11.5–15.4)
HGB UR QL STRIP.AUTO: NEGATIVE
IMM GRANULOCYTES # BLD AUTO: 0.02 THOUSAND/UL (ref 0–0.2)
IMM GRANULOCYTES NFR BLD AUTO: 0 % (ref 0–2)
KETONES UR STRIP-MCNC: NEGATIVE MG/DL
LEUKOCYTE ESTERASE UR QL STRIP: NEGATIVE
LIPASE SERPL-CCNC: 37 U/L (ref 11–82)
LYMPHOCYTES # BLD AUTO: 1.43 THOUSANDS/ÂΜL (ref 0.6–4.47)
LYMPHOCYTES NFR BLD AUTO: 31 % (ref 14–44)
MCH RBC QN AUTO: 28.7 PG (ref 26.8–34.3)
MCHC RBC AUTO-ENTMCNC: 33.2 G/DL (ref 31.4–37.4)
MCV RBC AUTO: 86 FL (ref 82–98)
MONOCYTES # BLD AUTO: 0.43 THOUSAND/ÂΜL (ref 0.17–1.22)
MONOCYTES NFR BLD AUTO: 9 % (ref 4–12)
MUCOUS THREADS UR QL AUTO: ABNORMAL
NEUTROPHILS # BLD AUTO: 2.64 THOUSANDS/ÂΜL (ref 1.85–7.62)
NEUTS SEG NFR BLD AUTO: 59 % (ref 43–75)
NITRITE UR QL STRIP: NEGATIVE
NON-SQ EPI CELLS URNS QL MICRO: ABNORMAL /HPF
NRBC BLD AUTO-RTO: 0 /100 WBCS
PH UR STRIP.AUTO: 7 [PH]
PLATELET # BLD AUTO: 222 THOUSANDS/UL (ref 149–390)
PMV BLD AUTO: 9.2 FL (ref 8.9–12.7)
POTASSIUM SERPL-SCNC: 4 MMOL/L (ref 3.5–5.3)
PROT SERPL-MCNC: 7.2 G/DL (ref 6.4–8.4)
PROT UR STRIP-MCNC: ABNORMAL MG/DL
RBC # BLD AUTO: 4.35 MILLION/UL (ref 3.81–5.12)
RBC #/AREA URNS AUTO: ABNORMAL /HPF
SODIUM SERPL-SCNC: 139 MMOL/L (ref 135–147)
SP GR UR STRIP.AUTO: 1.02 (ref 1–1.03)
UROBILINOGEN UR STRIP-ACNC: <2 MG/DL
WBC # BLD AUTO: 4.58 THOUSAND/UL (ref 4.31–10.16)
WBC #/AREA URNS AUTO: ABNORMAL /HPF

## 2024-02-23 PROCEDURE — 99284 EMERGENCY DEPT VISIT MOD MDM: CPT | Performed by: EMERGENCY MEDICINE

## 2024-02-23 PROCEDURE — 36415 COLL VENOUS BLD VENIPUNCTURE: CPT | Performed by: EMERGENCY MEDICINE

## 2024-02-23 PROCEDURE — 85025 COMPLETE CBC W/AUTO DIFF WBC: CPT | Performed by: EMERGENCY MEDICINE

## 2024-02-23 PROCEDURE — 83690 ASSAY OF LIPASE: CPT | Performed by: EMERGENCY MEDICINE

## 2024-02-23 PROCEDURE — 80053 COMPREHEN METABOLIC PANEL: CPT | Performed by: EMERGENCY MEDICINE

## 2024-02-23 PROCEDURE — 81025 URINE PREGNANCY TEST: CPT | Performed by: EMERGENCY MEDICINE

## 2024-02-23 PROCEDURE — 96374 THER/PROPH/DIAG INJ IV PUSH: CPT

## 2024-02-23 PROCEDURE — 86308 HETEROPHILE ANTIBODY SCREEN: CPT | Performed by: EMERGENCY MEDICINE

## 2024-02-23 PROCEDURE — 99284 EMERGENCY DEPT VISIT MOD MDM: CPT

## 2024-02-23 PROCEDURE — 81001 URINALYSIS AUTO W/SCOPE: CPT | Performed by: EMERGENCY MEDICINE

## 2024-02-23 PROCEDURE — C9113 INJ PANTOPRAZOLE SODIUM, VIA: HCPCS | Performed by: EMERGENCY MEDICINE

## 2024-02-23 PROCEDURE — 96375 TX/PRO/DX INJ NEW DRUG ADDON: CPT

## 2024-02-23 PROCEDURE — 74177 CT ABD & PELVIS W/CONTRAST: CPT

## 2024-02-23 RX ORDER — PANTOPRAZOLE SODIUM 40 MG/10ML
40 INJECTION, POWDER, LYOPHILIZED, FOR SOLUTION INTRAVENOUS ONCE
Status: COMPLETED | OUTPATIENT
Start: 2024-02-23 | End: 2024-02-23

## 2024-02-23 RX ORDER — PANTOPRAZOLE SODIUM 20 MG/1
20 TABLET, DELAYED RELEASE ORAL DAILY
Qty: 20 TABLET | Refills: 0 | Status: SHIPPED | OUTPATIENT
Start: 2024-02-23

## 2024-02-23 RX ORDER — ONDANSETRON 4 MG/1
4 TABLET, ORALLY DISINTEGRATING ORAL EVERY 8 HOURS PRN
Qty: 20 TABLET | Refills: 0 | Status: SHIPPED | OUTPATIENT
Start: 2024-02-23 | End: 2024-03-01

## 2024-02-23 RX ORDER — KETOROLAC TROMETHAMINE 30 MG/ML
30 INJECTION, SOLUTION INTRAMUSCULAR; INTRAVENOUS ONCE
Status: COMPLETED | OUTPATIENT
Start: 2024-02-23 | End: 2024-02-23

## 2024-02-23 RX ORDER — ONDANSETRON 2 MG/ML
4 INJECTION INTRAMUSCULAR; INTRAVENOUS ONCE
Status: COMPLETED | OUTPATIENT
Start: 2024-02-23 | End: 2024-02-23

## 2024-02-23 RX ADMIN — ONDANSETRON 4 MG: 2 INJECTION INTRAMUSCULAR; INTRAVENOUS at 07:08

## 2024-02-23 RX ADMIN — KETOROLAC TROMETHAMINE 30 MG: 30 INJECTION, SOLUTION INTRAMUSCULAR; INTRAVENOUS at 07:08

## 2024-02-23 RX ADMIN — PANTOPRAZOLE SODIUM 40 MG: 40 INJECTION, POWDER, FOR SOLUTION INTRAVENOUS at 07:08

## 2024-02-23 RX ADMIN — IOHEXOL 100 ML: 350 INJECTION, SOLUTION INTRAVENOUS at 07:44

## 2024-02-23 NOTE — ED PROVIDER NOTES
History  Chief Complaint   Patient presents with    Abdominal Pain     Patient reports abdominal pain that started last night around .  She reports history of appendicitis approximately 10 years ago that treated with IV antibiotic.     History from patient epigastric and periumbilical abdominal pain suddenly last night around 8 PM constant squeezing crampy sensation no relief with Tums.  She feels nausea.  Periods have been normal.  She feels chills.  She ate cheese and grapes for dinner last night.        Prior to Admission Medications   Prescriptions Last Dose Informant Patient Reported? Taking?   IBUPROFEN PO   Yes No   Sig: Take by mouth   Multiple Vitamin (MULTIVITAMIN) tablet  Self Yes No   Sig: Take 1 tablet by mouth daily Multivitamin w/ Fe   acetaminophen (TYLENOL) 325 mg tablet   No No   Sig: Take 2 tablets (650 mg total) by mouth every 6 (six) hours as needed for mild pain or moderate pain   levonorgestrel (MIRENA) 20 MCG/24HR IUD   Yes No   Si each by Intrauterine route once      Facility-Administered Medications: None       Past Medical History:   Diagnosis Date    Abnormal weight gain     Resolved 2017     Anemia     Anxiety     Bariatric surgery status     COVID 2023    Depression     DUB (dysfunctional uterine bleeding)     Last assessed 2/15/2013     Fatigue     Fracture of left ankle     Fracture of radius and ulna     Hyperopia     Seen in  by Dr. Donato Rebollar; no correction required at that time.     Miscarriage     Previous miscarriage during first trimester - 11 weeks     Postsurgical malabsorption     Varicella     Wears glasses        Past Surgical History:   Procedure Laterality Date     SECTION      x2    CHOLECYSTECTOMY LAPAROSCOPIC N/A 2023    Procedure: CHOLECYSTECTOMY LAPAROSCOPIC;  Surgeon: Kwabena Weldon MD;  Location:  MAIN OR;  Service: General    DILATION AND CURETTAGE OF UTERUS      MD EGD TRANSORAL BIOPSY SINGLE/MULTIPLE N/A 2017     Procedure: ESOPHAGOGASTRODUODENOSCOPY (EGD) with bx;  Surgeon: Davin Hahn MD;  Location: [facility];  Service: Bariatrics    NV LAPS GSTR RSTCV PX W/BYP MARIA ELENA-EN-Y LIMB <150 CM N/A 2019    Procedure: LAPAROSCOPIC MARIA ELENA-EN-Y GASTRIC BYPASS AND INTRAOPERATIVE EGD;  Surgeon: Davin Hahn MD;  Location: [facility];  Service: Bariatrics       Family History   Problem Relation Age of Onset    No Known Problems Mother     No Known Problems Father     Lung cancer Paternal Grandfather      I have reviewed and agree with the history as documented.    E-Cigarette/Vaping    E-Cigarette Use Never User      E-Cigarette/Vaping Substances     Social History     Tobacco Use    Smoking status: Former     Current packs/day: 0.00     Average packs/day: 1 pack/day for 5.0 years (5.0 ttl pk-yrs)     Types: Cigarettes     Start date: 2010     Quit date: 2015     Years since quittin.6    Smokeless tobacco: Never   Vaping Use    Vaping status: Never Used   Substance Use Topics    Alcohol use: No    Drug use: No       Review of Systems   Constitutional:  Negative for chills and fever.   HENT:  Negative for ear pain and sore throat.    Eyes:  Negative for pain and visual disturbance.   Respiratory:  Negative for cough and shortness of breath.    Cardiovascular:  Negative for chest pain and palpitations.   Gastrointestinal:  Positive for abdominal pain and nausea. Negative for vomiting.   Genitourinary:  Negative for dysuria, hematuria, vaginal bleeding and vaginal discharge.   Musculoskeletal:  Negative for arthralgias and back pain.   Skin:  Negative for color change and rash.   Neurological:  Negative for seizures and syncope.   All other systems reviewed and are negative.      Physical Exam  Physical Exam  Vitals and nursing note reviewed.   Constitutional:       General: She is not in acute distress.     Appearance: She is well-developed.   HENT:      Head: Normocephalic and atraumatic.   Eyes:       Conjunctiva/sclera: Conjunctivae normal.   Cardiovascular:      Rate and Rhythm: Normal rate and regular rhythm.      Heart sounds: No murmur heard.  Pulmonary:      Effort: Pulmonary effort is normal. No respiratory distress.      Breath sounds: Normal breath sounds.   Abdominal:      Palpations: Abdomen is soft.      Tenderness: There is abdominal tenderness in the right upper quadrant and right lower quadrant. There is no guarding or rebound.   Musculoskeletal:         General: No swelling.      Cervical back: Neck supple.   Skin:     General: Skin is warm and dry.      Capillary Refill: Capillary refill takes less than 2 seconds.   Neurological:      Mental Status: She is alert.   Psychiatric:         Mood and Affect: Mood normal.         Vital Signs  ED Triage Vitals   Temperature Pulse Respirations Blood Pressure SpO2   02/23/24 0515 02/23/24 0515 02/23/24 0515 02/23/24 0515 02/23/24 0515   97.7 °F (36.5 °C) 57 18 135/78 99 %      Temp Source Heart Rate Source Patient Position - Orthostatic VS BP Location FiO2 (%)   02/23/24 0515 02/23/24 0515 02/23/24 0515 02/23/24 0515 --   Temporal Monitor Sitting Right arm       Pain Score       02/23/24 0708       7           Vitals:    02/23/24 0515 02/23/24 0800   BP: 135/78 110/57   Pulse: 57 81   Patient Position - Orthostatic VS: Sitting Sitting         Visual Acuity      ED Medications  Medications   ketorolac (TORADOL) injection 30 mg (30 mg Intravenous Given 2/23/24 0708)   ondansetron (ZOFRAN) injection 4 mg (4 mg Intravenous Given 2/23/24 0708)   pantoprazole (PROTONIX) injection 40 mg (40 mg Intravenous Given 2/23/24 0708)   iohexol (OMNIPAQUE) 350 MG/ML injection (MULTI-DOSE) 100 mL (100 mL Intravenous Given 2/23/24 0744)       Diagnostic Studies  Results Reviewed       Procedure Component Value Units Date/Time    Mononucleosis screen [[id number]]  (Normal) Collected: 02/23/24 0521    Lab Status: Final result Specimen: Blood Updated: 02/23/24 1113     Monotest  Negative    Urine Microscopic [[id number]]  (Abnormal) Collected: 02/23/24 0704    Lab Status: Final result Specimen: Urine, Clean Catch Updated: 02/23/24 0726     RBC, UA None Seen /hpf      WBC, UA 0-1 /hpf      Epithelial Cells Occasional /hpf      Bacteria, UA Occasional /hpf      MUCUS THREADS Occasional    UA (URINE) with reflex to Scope [[id number]]  (Abnormal) Collected: 02/23/24 0704    Lab Status: Final result Specimen: Urine, Clean Catch Updated: 02/23/24 0725     Color, UA Yellow     Clarity, UA Clear     Specific Gravity, UA 1.020     pH, UA 7.0     Leukocytes, UA Negative     Nitrite, UA Negative     Protein, UA Trace mg/dl      Glucose, UA Negative mg/dl      Ketones, UA Negative mg/dl      Urobilinogen, UA <2.0 mg/dl      Bilirubin, UA Negative     Occult Blood, UA Negative    POCT pregnancy, urine [[id number]]  (Normal) Resulted: 02/23/24 0705    Lab Status: Final result Updated: 02/23/24 0705     EXT Preg Test, Ur Negative     Control Valid    Comprehensive metabolic panel [[id number]] Collected: 02/23/24 0521    Lab Status: Final result Specimen: Blood from Arm, Left Updated: 02/23/24 0549     Sodium 139 mmol/L      Potassium 4.0 mmol/L      Chloride 108 mmol/L      CO2 27 mmol/L      ANION GAP 4 mmol/L      BUN 12 mg/dL      Creatinine 0.77 mg/dL      Glucose 92 mg/dL      Calcium 9.4 mg/dL      AST 14 U/L      ALT 10 U/L      Alkaline Phosphatase 55 U/L      Total Protein 7.2 g/dL      Albumin 4.2 g/dL      Total Bilirubin 0.40 mg/dL      eGFR 102 ml/min/1.73sq m     Narrative:      National Kid[employer] Foundation guidelines for Chronic Kidney Disease (CKD):     Stage 1 with normal or high GFR (GFR > 90 mL/min/1.73 square meters)    Stage 2 Mild CKD (GFR = 60-89 mL/min/1.73 square meters)    Stage 3A Moderate CKD (GFR = 45-59 mL/min/1.73 square meters)    Stage 3B Moderate CKD (GFR = 30-44 mL/min/1.73 square meters)    Stage 4 Severe CKD (GFR = 15-29 mL/min/1.73 square meters)    Stage 5 End  Stage CKD (GFR <15 mL/min/1.73 square meters)  Note: GFR calculation is accurate only with a steady state creatinine    Lipase [[id number]]  (Normal) Collected: 02/23/24 0521    Lab Status: Final result Specimen: Blood from Arm, Left Updated: 02/23/24 0549     Lipase 37 u/L     CBC and differential [[id number]] Collected: 02/23/24 0521    Lab Status: Final result Specimen: Blood from Arm, Left Updated: 02/23/24 0530     WBC 4.58 Thousand/uL      RBC 4.35 Million/uL      Hemoglobin 12.5 g/dL      Hematocrit 37.6 %      MCV 86 fL      MCH 28.7 pg      MCHC 33.2 g/dL      RDW 12.6 %      MPV 9.2 fL      Platelets 222 Thousands/uL      nRBC 0 /100 WBCs      Neutrophils Relative 59 %      Immat GRANS % 0 %      Lymphocytes Relative 31 %      Monocytes Relative 9 %      Eosinophils Relative 1 %      Basophils Relative 0 %      Neutrophils Absolute 2.64 Thousands/µL      Immature Grans Absolute 0.02 Thousand/uL      Lymphocytes Absolute 1.43 Thousands/µL      Monocytes Absolute 0.43 Thousand/µL      Eosinophils Absolute 0.05 Thousand/µL      Basophils Absolute 0.01 Thousands/µL                    CT abdomen pelvis with contrast   Final Result by Herve Mon MD (02/23 0801)      No acute intra-abdominal abnormality. Normal appendix.      Splenomegaly.         Workstation performed: [id number]                    Procedures  Procedures         ED Course                               SBIRT 20yo+      Flowsheet Row Most Recent Value   Initial Alcohol Screen: US AUDIT-C     1. How often do you have a drink containing alcohol? 0 Filed at: 02/23/2024 0803   2. How many drinks containing alcohol do you have on a typical day you are drinking?  0 Filed at: 02/23/2024 0803   3a. Male UNDER 65: How often do you have five or more drinks on one occasion? 0 Filed at: 02/23/2024 0803   3b. FEMALE Any Age, or MALE 65+: How often do you have 4 or more drinks on one occassion? 0 Filed at: 02/23/2024 0803   Audit-C Score 0 Filed at:  02/23/2024 0803   MORIAH: How many times in the past year have you...    Used an illegal drug or used a prescription medication for non-medical reasons? Never Filed at: 02/23/2024 0803                      Medical Decision Making  Diff includes gas pains, less likely appendicitis, gastritis, biliary colic enteritis colitis, ovary cyst    Amount and/or Complexity of Data Reviewed  Labs: ordered.  Radiology: ordered.    Risk  Prescription drug management.             Disposition  Final diagnoses:   Abdominal pain   Splenomegaly     Time reflects when diagnosis was documented in both MDM as applicable and the Disposition within this note       Time User Action Codes Description Comment    2/23/2024  8:22 AM Colin Stovall [R10.9] Abdominal pain     2/23/2024  8:22 AM Colin Stovall [R16.1] Splenomegaly           ED Disposition       ED Disposition   Discharge    Condition   Stable    Date/Time   Fri Feb 23, 2024 0822    Comment   Hina Munoz discharge to home/self care.                   Follow-up Information       Follow up With Specialties Details Why Contact Info    Miguelina Centeno DO Family Medicine Schedule an appointment as soon as possible for a visit   [address] [place] [address]  [phone]              Discharge Medication List as of 2/23/2024  8:23 AM        START taking these medications    Details   ondansetron (ZOFRAN-ODT) 4 mg disintegrating tablet Take 1 tablet (4 mg total) by mouth every 8 (eight) hours as needed for nausea or vomiting for up to 7 days, Starting Fri 2/23/2024, Until Fri 3/1/2024 at 2359, Normal      pantoprazole (PROTONIX) 20 mg tablet Take 1 tablet (20 mg total) by mouth daily, Starting Fri 2/23/2024, Normal           CONTINUE these medications which have NOT CHANGED    Details   acetaminophen (TYLENOL) 325 mg tablet Take 2 tablets (650 mg total) by mouth every 6 (six) hours as needed for mild pain or moderate pain, Starting Wed 6/28/2023, No Print       levonorgestrel (MIRENA) 20 MCG/24HR IUD 1 each by Intrauterine route once, Historical Med      Multiple Vitamin (MULTIVITAMIN) tablet Take 1 tablet by mouth daily Multivitamin w/ Fe, Historical Med           STOP taking these medications       IBUPROFEN PO Comments:   Reason for Stopping:               No discharge procedures on file.    PDMP Review         Value Time User    PDMP Reviewed  Yes 6/28/2023  8:31 AM Molly Sewell PA-C            ED Provider  Electronically Signed by             Colin Stovall DO  02/23/24 [id number]

## 2024-05-31 DIAGNOSIS — R53.82 CHRONIC FATIGUE: ICD-10-CM

## 2024-05-31 DIAGNOSIS — Z13.220 SCREENING FOR LIPID DISORDERS: ICD-10-CM

## 2024-05-31 DIAGNOSIS — F50.89 PICA IN ADULTS: Primary | ICD-10-CM

## 2024-06-05 ENCOUNTER — APPOINTMENT (OUTPATIENT)
Dept: LAB | Facility: HOSPITAL | Age: 34
End: 2024-06-05
Payer: COMMERCIAL

## 2024-06-05 DIAGNOSIS — R53.82 CHRONIC FATIGUE: ICD-10-CM

## 2024-06-05 DIAGNOSIS — F50.89 PICA IN ADULTS: ICD-10-CM

## 2024-06-05 LAB
CHOLEST SERPL-MCNC: 156 MG/DL
FERRITIN SERPL-MCNC: 9 NG/ML (ref 11–307)
HDLC SERPL-MCNC: 57 MG/DL
IRON SERPL-MCNC: 105 UG/DL (ref 50–212)
LDLC SERPL CALC-MCNC: 84 MG/DL (ref 0–100)
TRIGL SERPL-MCNC: 77 MG/DL
TSH SERPL DL<=0.05 MIU/L-ACNC: 0.66 UIU/ML (ref 0.45–4.5)

## 2024-06-05 PROCEDURE — 80061 LIPID PANEL: CPT | Performed by: FAMILY MEDICINE

## 2024-06-05 PROCEDURE — 36415 COLL VENOUS BLD VENIPUNCTURE: CPT | Performed by: FAMILY MEDICINE

## 2024-06-05 PROCEDURE — 83540 ASSAY OF IRON: CPT

## 2024-06-05 PROCEDURE — 84443 ASSAY THYROID STIM HORMONE: CPT

## 2024-06-05 PROCEDURE — 82728 ASSAY OF FERRITIN: CPT

## 2024-06-14 ENCOUNTER — TELEPHONE (OUTPATIENT)
Dept: FAMILY MEDICINE CLINIC | Facility: CLINIC | Age: 34
End: 2024-06-14

## 2024-06-14 NOTE — TELEPHONE ENCOUNTER
----- Message from Radha PEACOCK sent at 6/14/2024  2:38 PM EDT -----  Spoke to patient, she is concerned with her low ferritin levels and has received iron infusions in the past, if appropriate she would like to start those again as she does not want them to go much lower. She does take supplements, eats a very consistent diet, and is aware that due to her G-Bypass she does not absorb all the nutrients well. Please advise and let us know if we can assist further.

## 2024-08-20 ENCOUNTER — OFFICE VISIT (OUTPATIENT)
Dept: BARIATRICS | Facility: CLINIC | Age: 34
End: 2024-08-20
Payer: COMMERCIAL

## 2024-08-20 VITALS
SYSTOLIC BLOOD PRESSURE: 118 MMHG | OXYGEN SATURATION: 98 % | BODY MASS INDEX: 33.19 KG/M2 | HEART RATE: 70 BPM | WEIGHT: 206.5 LBS | HEIGHT: 66 IN | DIASTOLIC BLOOD PRESSURE: 72 MMHG | RESPIRATION RATE: 18 BRPM

## 2024-08-20 DIAGNOSIS — Z48.815 ENCOUNTER FOR SURGICAL AFTERCARE FOLLOWING SURGERY OF DIGESTIVE SYSTEM: Primary | ICD-10-CM

## 2024-08-20 DIAGNOSIS — Z98.84 BARIATRIC SURGERY STATUS: ICD-10-CM

## 2024-08-20 DIAGNOSIS — D62 ACUTE BLOOD LOSS ANEMIA: ICD-10-CM

## 2024-08-20 DIAGNOSIS — E53.8 B12 DEFICIENCY: ICD-10-CM

## 2024-08-20 DIAGNOSIS — D50.8 IRON DEFICIENCY ANEMIA SECONDARY TO INADEQUATE DIETARY IRON INTAKE: ICD-10-CM

## 2024-08-20 DIAGNOSIS — E66.9 OBESITY, CLASS I, BMI 30-34.9: ICD-10-CM

## 2024-08-20 DIAGNOSIS — K91.2 POSTSURGICAL MALABSORPTION: ICD-10-CM

## 2024-08-20 PROCEDURE — 99203 OFFICE O/P NEW LOW 30 MIN: CPT | Performed by: PHYSICIAN ASSISTANT

## 2024-08-20 RX ORDER — SODIUM CHLORIDE 9 MG/ML
20 INJECTION, SOLUTION INTRAVENOUS ONCE
OUTPATIENT
Start: 2024-08-28

## 2024-08-20 NOTE — PROGRESS NOTES
Date of surgery: 02/25/2019  Procedure: RNY   Performing surgeon: ALBERT    Initial Weight - 279.0  Current Weight - 206.5  Total Body Weight Loss (EWL)- 68.6  EWL% - 56%  TWB % - 25 %

## 2024-08-20 NOTE — PROGRESS NOTES
Assessment/Plan:     Patient ID: Hina Munoz is a 33 y.o. female.     Bariatric Surgery Status    s/p Bisi-En-Y Gastric Bypass with Dr. Hahn on 2/24/2019. HERE FOR OD ANNUAL. Overall doing fair but feels she hit a weight loss plateau after she gained some weight since her last visit. She is looking to lose a few more pounds  She states she started gaining weight after she had a cholecystectomy. Overall states her diet is mostly healthy. She was exercising more frequently but now has more limited time since starting a new job.   Interested in [facility] - will refer     Iron deficiency   Low ferritin noted on recent labs, has hx of iron def requiring infusions. Will schedule for venofer session x5. No reaction history noted to iron infusions     Continued/Maintain healthy weight loss with good nutrition intakes.  Adequate hydration with at least 64oz. fluid intake.  Follow diet as discussed.  Follow vitamin and mineral recommendations as reviewed with you.  Exercise as tolerated.    Colonoscopy referral made: n/a    Follow-up in 1 year. We kindly ask that your arrive 15 minutes before your scheduled appointment time with your provider to allow our staff to room you, get your vital signs and update your chart.    Get lab work done . Please call the office if you need a script.  It is recommended to check with your insurance BEFORE getting labs done to make sure they are covered by your policy.      Call our office if you have any problems with abdominal pain especially associated with fever, chills, nausea, vomiting or any other concerns.    All  Post-bariatric surgery patients should be aware that very small quantities of any alcohol can cause impairment and it is very possible not to feel the effect. The effect can be in the system for several hours.  It is also a stomach irritant.     It is advised to AVOID alcohol, Nonsteroidal antiinflammatory drugs (NSAIDS) and nicotine of all forms . Any of these can cause  stomach irritation/pain.    Discussed the effects of alcohol on a bariatric patient and the increased impairment risk.     Keep up the good work!     Postsurgical Malabsorption   -At risk for malabsorption of vitamins/minerals secondary to malabsorption and restriction of intake from bariatric surgery  -Currently taking adequate postop bariatric surgery vitamin supplementation  -Last set of bariatric labs completed 6/2024- iron labs showed iron deficiency   -Next set of bariatric labs ordered for approximately 3 months  -Patient received education about the importance of adhering to a lifelong supplementation regimen to avoid vitamin/mineral deficiencies      Diagnoses and all orders for this visit:    Encounter for surgical aftercare following surgery of digestive system  -     Zinc; Future  -     Vitamin D 25 hydroxy; Future  -     Vitamin B12; Future  -     Vitamin B1, whole blood; Future  -     Vitamin A; Future  -     PTH, intact; Future  -     CBC and Platelet; Future  -     Comprehensive metabolic panel; Future  -     Ferritin; Future  -     Folate; Future  -     TIBC Panel (incl. Iron, TIBC, % Iron Saturation); Future    Iron deficiency anemia secondary to inadequate dietary iron intake  -     Zinc; Future  -     Vitamin D 25 hydroxy; Future  -     Vitamin B12; Future  -     Vitamin B1, whole blood; Future  -     Vitamin A; Future  -     PTH, intact; Future  -     CBC and Platelet; Future  -     Comprehensive metabolic panel; Future  -     Ferritin; Future  -     Folate; Future  -     TIBC Panel (incl. Iron, TIBC, % Iron Saturation); Future    Acute blood loss anemia  -     Zinc; Future  -     Vitamin D 25 hydroxy; Future  -     Vitamin B12; Future  -     Vitamin B1, whole blood; Future  -     Vitamin A; Future  -     PTH, intact; Future  -     CBC and Platelet; Future  -     Comprehensive metabolic panel; Future  -     Ferritin; Future  -     Folate; Future  -     TIBC Panel (incl. Iron, TIBC, % Iron  Saturation); Future    B12 deficiency  -     Zinc; Future  -     Vitamin D 25 hydroxy; Future  -     Vitamin B12; Future  -     Vitamin B1, whole blood; Future  -     Vitamin A; Future  -     PTH, intact; Future  -     CBC and Platelet; Future  -     Comprehensive metabolic panel; Future  -     Ferritin; Future  -     Folate; Future  -     TIBC Panel (incl. Iron, TIBC, % Iron Saturation); Future    Bariatric surgery status  -     Zinc; Future  -     Vitamin D 25 hydroxy; Future  -     Vitamin B12; Future  -     Vitamin B1, whole blood; Future  -     Vitamin A; Future  -     PTH, intact; Future  -     CBC and Platelet; Future  -     Comprehensive metabolic panel; Future  -     Ferritin; Future  -     Folate; Future  -     TIBC Panel (incl. Iron, TIBC, % Iron Saturation); Future    Postsurgical malabsorption  -     Zinc; Future  -     Vitamin D 25 hydroxy; Future  -     Vitamin B12; Future  -     Vitamin B1, whole blood; Future  -     Vitamin A; Future  -     PTH, intact; Future  -     CBC and Platelet; Future  -     Comprehensive metabolic panel; Future  -     Ferritin; Future  -     Folate; Future  -     TIBC Panel (incl. Iron, TIBC, % Iron Saturation); Future    Obesity, Class I, BMI 30-34.9  -     Zinc; Future  -     Vitamin D 25 hydroxy; Future  -     Vitamin B12; Future  -     Vitamin B1, whole blood; Future  -     Vitamin A; Future  -     PTH, intact; Future  -     CBC and Platelet; Future  -     Comprehensive metabolic panel; Future  -     Ferritin; Future  -     Folate; Future  -     TIBC Panel (incl. Iron, TIBC, % Iron Saturation); Future    Other orders  -     sodium chloride 0.9 % infusion  -     iron sucrose (VENOFER) 200 mg in sodium chloride 0.9 % 100 mL IVPB         Subjective:      Patient ID: Hina Munoz is a 33 y.o. female.    Date of surgery: 02/25/2019  Procedure: RNY   Performing surgeon: ALBERT     Initial Weight - 279.0  Current Weight - 206.5  Total Body Weight Loss (EWL)- 68.6  EWL% -  "56%  TWB % - 25 %     Current BMI is Body mass index is 32.93 kg/m².    Tolerating a regular diet-yes  Eating at least 60 grams of protein per day-yes  Drinking at least 64 ounces of fluid per day-yes  Sufficient exercise-light   Using NSAIDs regularly-no  Using nicotine-no  Using alcohol-no  Supplements: inconsistent - shaan  MVI     The following portions of the patient's history were reviewed and updated as appropriate: allergies, current medications, past family history, past medical history, past social history, past surgical history and problem list.    Review of Systems   Constitutional: Negative.    Respiratory: Negative.     Cardiovascular: Negative.    Gastrointestinal: Negative.    Neurological: Negative.    Psychiatric/Behavioral: Negative.           Objective:    /72 (BP Location: Left arm, Patient Position: Sitting, Cuff Size: Adult)   Pulse 70   Resp 18   Ht 5' 6.4\" (1.687 m)   Wt 93.7 kg (206 lb 8 oz)   SpO2 98%   BMI 32.93 kg/m²      Physical Exam  Vitals and nursing note reviewed.   Constitutional:       Appearance: Normal appearance. She is obese.   HENT:      Head: Normocephalic and atraumatic.   Eyes:      Extraocular Movements: Extraocular movements intact.   Cardiovascular:      Rate and Rhythm: Normal rate.   Pulmonary:      Effort: Pulmonary effort is normal.   Musculoskeletal:         General: Normal range of motion.      Cervical back: Normal range of motion.   Skin:     General: Skin is warm and dry.   Neurological:      General: No focal deficit present.      Mental Status: She is alert and oriented to person, place, and time.   Psychiatric:         Mood and Affect: Mood normal.             "

## 2024-08-21 ENCOUNTER — OFFICE VISIT (OUTPATIENT)
Dept: FAMILY MEDICINE CLINIC | Facility: CLINIC | Age: 34
End: 2024-08-21
Payer: COMMERCIAL

## 2024-08-21 VITALS
HEART RATE: 75 BPM | OXYGEN SATURATION: 100 % | DIASTOLIC BLOOD PRESSURE: 74 MMHG | HEIGHT: 66 IN | BODY MASS INDEX: 33.43 KG/M2 | SYSTOLIC BLOOD PRESSURE: 122 MMHG | WEIGHT: 208 LBS | TEMPERATURE: 97 F

## 2024-08-21 DIAGNOSIS — D50.8 IRON DEFICIENCY ANEMIA SECONDARY TO INADEQUATE DIETARY IRON INTAKE: ICD-10-CM

## 2024-08-21 DIAGNOSIS — J34.89 NASAL SORE: ICD-10-CM

## 2024-08-21 DIAGNOSIS — Z00.00 WELL ADULT EXAM: Primary | ICD-10-CM

## 2024-08-21 PROCEDURE — 87147 CULTURE TYPE IMMUNOLOGIC: CPT | Performed by: FAMILY MEDICINE

## 2024-08-21 PROCEDURE — 99213 OFFICE O/P EST LOW 20 MIN: CPT | Performed by: FAMILY MEDICINE

## 2024-08-21 PROCEDURE — 87081 CULTURE SCREEN ONLY: CPT | Performed by: FAMILY MEDICINE

## 2024-08-21 PROCEDURE — 99395 PREV VISIT EST AGE 18-39: CPT | Performed by: FAMILY MEDICINE

## 2024-08-21 RX ORDER — MUPIROCIN 20 MG/G
OINTMENT TOPICAL DAILY
Qty: 50 G | Refills: 1 | Status: SHIPPED | OUTPATIENT
Start: 2024-08-21

## 2024-08-21 NOTE — PATIENT INSTRUCTIONS
"Mupirocin apply topically daily at bedtime  Consider bactrim if culture is positive     Patient Education     Routine physical for adults   The Basics   Written by the doctors and editors at [facility]   What is a physical? -- A physical is a routine visit, or \"check-up,\" with your doctor. You might also hear it called a \"wellness visit\" or \"preventive visit.\"  During each visit, the doctor will:   Ask about your physical and mental health   Ask about your habits, behaviors, and lifestyle   Do an exam   Give you vaccines if needed   Talk to you about any medicines you take   Give advice about your health   Answer your questions  Getting regular check-ups is an important part of taking care of your health. It can help your doctor find and treat any problems you have. But it's also important for preventing health problems.  A routine physical is different from a \"sick visit.\" A sick visit is when you see a doctor because of a health concern or problem. Since physicals are scheduled ahead of time, you can think about what you want to ask the doctor.  How often should I get a physical? -- It depends on your age and health. In general, for people age 21 years and older:   If you are younger than 50 years, you might be able to get a physical every 3 years.   If you are 50 years or older, your doctor might recommend a physical every year.  If you have an ongoing health condition, like diabetes or high blood pressure, your doctor will probably want to see you more often.  What happens during a physical? -- In general, each visit will include:   Physical exam - The doctor or nurse will check your height, weight, heart rate, and blood pressure. They will also look at your eyes and ears. They will ask about how you are feeling and whether you have any symptoms that bother you.   Medicines - It's a good idea to bring a list of all the medicines you take to each doctor visit. Your doctor will talk to you about your medicines and " "answer any questions. Tell them if you are having any side effects that bother you. You should also tell them if you are having trouble paying for any of your medicines.   Habits and behaviors - This includes:   Your diet   Your exercise habits   Whether you smoke, drink alcohol, or use drugs   Whether you are sexually active   Whether you feel safe at home  Your doctor will talk to you about things you can do to improve your health and lower your risk of health problems. They will also offer help and support. For example, if you want to quit smoking, they can give you advice and might prescribe medicines. If you want to improve your diet or get more physical activity, they can help you with this, too.   Lab tests, if needed - The tests you get will depend on your age and situation. For example, your doctor might want to check your:   Cholesterol   Blood sugar   Iron level   Vaccines - The recommended vaccines will depend on your age, health, and what vaccines you already had. Vaccines are very important because they can prevent certain serious or deadly infections.   Discussion of screening - \"Screening\" means checking for diseases or other health problems before they cause symptoms. Your doctor can recommend screening based on your age, risk, and preferences. This might include tests to check for:   Cancer, such as breast, prostate, cervical, ovarian, colorectal, prostate, lung, or skin cancer   Sexually transmitted infections, such as chlamydia and gonorrhea   Mental health conditions like depression and anxiety  Your doctor will talk to you about the different types of screening tests. They can help you decide which screenings to have. They can also explain what the results might mean.   Answering questions - The physical is a good time to ask the doctor or nurse questions about your health. If needed, they can refer you to other doctors or specialists, too.  Adults older than 65 years often need other care, " too. As you get older, your doctor will talk to you about:   How to prevent falling at home   Hearing or vision tests   Memory testing   How to take your medicines safely   Making sure that you have the help and support you need at home  All topics are updated as new evidence becomes available and our peer review process is complete.  This topic retrieved from [facility] on: May 02, 2024.  Topic 658783 Version 1.0  Release: 32.4.3 - C32.122  © 2024 UpToDate, Inc. and/or its affiliates. All rights reserved.  Consumer Information Use and Disclaimer   Disclaimer: This generalized information is a limited summary of diagnosis, treatment, and/or medication information. It is not meant to be comprehensive and should be used as a tool to help the user understand and/or assess potential diagnostic and treatment options. It does NOT include all information about conditions, treatments, medications, side effects, or risks that may apply to a specific patient. It is not intended to be medical advice or a substitute for the medical advice, diagnosis, or treatment of a health care provider based on the health care provider's examination and assessment of a patient's specific and unique circumstances. Patients must speak with a health care provider for complete information about their health, medical questions, and treatment options, including any risks or benefits regarding use of medications. This information does not endorse any treatments or medications as safe, effective, or approved for treating a specific patient. UpToDate, Inc. and its affiliates disclaim any warranty or liability relating to this information or the use thereof.The use of this information is governed by the Terms of Use, available at https://www.wolterskluwer.com/en/know/clinical-effectiveness-terms. 2024© UpToDate, Inc. and its affiliates and/or licensors. All rights reserved.  Copyright   © 2024 UpToDate, Inc. and/or its affiliates. All rights  reserved.

## 2024-08-21 NOTE — PROGRESS NOTES
ADULT ANNUAL PHYSICAL  [facility]    NAME: Hina Munoz  AGE: 33 y.o. SEX: female  : 1990     DATE: 2024     Assessment and Plan:     1. Well adult exam  2. Nasal sore  Assessment & Plan:  Culture done today, r/o MRSA. Works in health care  Start mupirocin ointment once a day at bedtime.   Consider oral antibiotic if not healing or positive culture for mrsa- bactrim  Orders:  -     mupirocin (BACTROBAN) 2 % ointment; Apply topically daily  -     MRSA culture  3. Iron deficiency anemia secondary to inadequate dietary iron intake  Assessment & Plan:  Pt getting iron infusions  4. BMI 33.0-33.9,adult      Immunizations and preventive care screenings were discussed with patient today. Appropriate education was printed on patient's after visit summary.    Counseling:  Dental Health: discussed importance of regular tooth brushing, flossing, and dental visits.  Exercise: the importance of regular exercise/physical activity was discussed. Recommend exercise 3-5 times per week for at least 30 minutes.       Depression Screening and Follow-up Plan: Patient's depression screening was positive with a PHQ-9 score of 10. Patient assessed for underlying major depression. Brief counseling provided and recommend additional follow-up/re-evaluation next office visit.         No follow-ups on file.     Chief Complaint:     Chief Complaint   Patient presents with    Physical Exam     Also has opening in right nostril. Thinks it might be MRSA      History of Present Illness:     Pt is seen for a physical. She Noticed a sore in the right side of her nostril. Not going away. Not putting anything on the area. Has been present for a month.   Getting 5 iron infusions    starting school-             Review of Systems:     Review of Systems   Constitutional: Negative.  Negative for fatigue and fever.   HENT:          Nasal sore right nostril   Eyes: Negative.     Respiratory: Negative.  Negative for cough.    Cardiovascular: Negative.    Gastrointestinal: Negative.    Endocrine: Negative.    Genitourinary: Negative.    Musculoskeletal: Negative.    Skin: Negative.    Allergic/Immunologic: Negative.    Neurological: Negative.    Psychiatric/Behavioral: Negative.        Past Medical History:     Past Medical History:   Diagnosis Date    Abnormal weight gain     Resolved 2017     Anemia     Anxiety     Bariatric surgery status     COVID 2023    Depression     DUB (dysfunctional uterine bleeding)     Last assessed 2/15/2013     Fatigue     Fracture of left ankle     Fracture of radius and ulna     Hyperopia     Seen in  by Dr. Donato Rebollar; no correction required at that time.     Miscarriage     Previous miscarriage during first trimester - 11 weeks     Postsurgical malabsorption     Varicella     Wears glasses       Past Surgical History:     Past Surgical History:   Procedure Laterality Date     SECTION      x2    CHOLECYSTECTOMY LAPAROSCOPIC N/A 2023    Procedure: CHOLECYSTECTOMY LAPAROSCOPIC;  Surgeon: Kwabena Weldon MD;  Location:  MAIN OR;  Service: General    DILATION AND CURETTAGE OF UTERUS      IN EGD TRANSORAL BIOPSY SINGLE/MULTIPLE N/A 2017    Procedure: ESOPHAGOGASTRODUODENOSCOPY (EGD) with bx;  Surgeon: Davin Hahn MD;  Location: [facility];  Service: Bariatrics    IN LAPS GSTR RSTCV PX W/BYP MARIA ELENA-EN-Y LIMB <150 CM N/A 2019    Procedure: LAPAROSCOPIC MARIA ELENA-EN-Y GASTRIC BYPASS AND INTRAOPERATIVE EGD;  Surgeon: Davin Hahn MD;  Location: [facility];  Service: Bariatrics      Social History:     Social History     Socioeconomic History    Marital status: /Civil Union     Spouse name: None    Number of children: None    Years of education: None    Highest education level: None   Occupational History    None   Tobacco Use    Smoking status: Former     Current packs/day: 0.00     Average packs/day: 1 pack/day  "for 5.0 years (5.0 ttl pk-yrs)     Types: Cigarettes     Start date: 2010     Quit date: 2015     Years since quittin.1    Smokeless tobacco: Never   Vaping Use    Vaping status: Never Used   Substance and Sexual Activity    Alcohol use: No    Drug use: No    Sexual activity: Yes     Partners: Male     Birth control/protection: I.U.D.   Other Topics Concern    None   Social History Narrative    Does not exercise      Social Determinants of Health     Financial Resource Strain: Not on file   Food Insecurity: Not on file   Transportation Needs: Not on file   Physical Activity: Not on file   Stress: Not on file   Social Connections: Not on file   Intimate Partner Violence: Not on file   Housing Stability: Not on file      Family History:     Family History   Problem Relation Age of Onset    No Known Problems Mother     No Known Problems Father     Lung cancer Paternal Grandfather       Current Medications:     Current Outpatient Medications   Medication Sig Dispense Refill    levonorgestrel (MIRENA) 20 MCG/24HR IUD 1 each by Intrauterine route once      Multiple Vitamin (MULTIVITAMIN) tablet Take 1 tablet by mouth daily Multivitamin w/ Fe      mupirocin (BACTROBAN) 2 % ointment Apply topically daily 50 g 1     No current facility-administered medications for this visit.      Allergies:     No Known Allergies   Physical Exam:     /74 (BP Location: Left arm, Patient Position: Sitting, Cuff Size: Adult)   Pulse 75   Temp (!) 97 °F (36.1 °C) (Tympanic)   Ht 5' 6\" (1.676 m)   Wt 94.3 kg (208 lb)   SpO2 100%   BMI 33.57 kg/m²     Physical Exam  Vitals and nursing note reviewed.   Constitutional:       Appearance: She is well-developed.   HENT:      Head: Normocephalic and atraumatic.      Right Ear: External ear normal.      Left Ear: External ear normal.      Nose:      Comments: Nasal erythema right lateral nasal wall.   Eyes:      Conjunctiva/sclera: Conjunctivae normal.      Pupils: Pupils are " equal, round, and reactive to light.   Cardiovascular:      Rate and Rhythm: Normal rate and regular rhythm.      Heart sounds: Normal heart sounds.   Pulmonary:      Effort: Pulmonary effort is normal.      Breath sounds: Normal breath sounds.   Abdominal:      General: Bowel sounds are normal.      Palpations: Abdomen is soft.   Musculoskeletal:         General: Normal range of motion.      Cervical back: Normal range of motion and neck supple.   Skin:     General: Skin is warm and dry.   Neurological:      Mental Status: She is alert and oriented to person, place, and time.   Psychiatric:         Behavior: Behavior normal.         Thought Content: Thought content normal.         Judgment: Judgment normal.          Miguelina Centeno DO  [facility]

## 2024-08-22 LAB
MRSA NOSE QL CULT: ABNORMAL
MRSA NOSE QL CULT: ABNORMAL

## 2024-08-25 PROBLEM — J34.89 NASAL SORE: Status: ACTIVE | Noted: 2024-08-25

## 2024-08-25 NOTE — ASSESSMENT & PLAN NOTE
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 Works in health care  Start mupirocin ointment once a day at bedtime.   Consider oral antibiotic if not healing or positive culture for mrsa- bactrim

## 2024-08-26 DIAGNOSIS — Z22.322 MRSA (METHICILLIN RESISTANT STAPH AUREUS) CULTURE POSITIVE: Primary | ICD-10-CM

## 2024-08-26 RX ORDER — SULFAMETHOXAZOLE/TRIMETHOPRIM 800-160 MG
1 TABLET ORAL EVERY 12 HOURS SCHEDULED
Qty: 20 TABLET | Refills: 0 | Status: SHIPPED | OUTPATIENT
Start: 2024-08-26 | End: 2024-09-05

## 2024-09-11 ENCOUNTER — HOSPITAL ENCOUNTER (OUTPATIENT)
Dept: INFUSION CENTER | Facility: HOSPITAL | Age: 34
Discharge: HOME | End: 2024-09-11
Payer: COMMERCIAL

## 2024-09-11 VITALS
RESPIRATION RATE: 18 BRPM | HEART RATE: 86 BPM | SYSTOLIC BLOOD PRESSURE: 122 MMHG | OXYGEN SATURATION: 98 % | TEMPERATURE: 97.9 F | DIASTOLIC BLOOD PRESSURE: 72 MMHG

## 2024-09-11 DIAGNOSIS — E53.8 B12 DEFICIENCY: ICD-10-CM

## 2024-09-11 DIAGNOSIS — D50.8 IRON DEFICIENCY ANEMIA SECONDARY TO INADEQUATE DIETARY IRON INTAKE: Primary | ICD-10-CM

## 2024-09-11 DIAGNOSIS — D62 ACUTE BLOOD LOSS ANEMIA: ICD-10-CM

## 2024-09-11 PROCEDURE — 96365 THER/PROPH/DIAG IV INF INIT: CPT

## 2024-09-11 RX ORDER — SODIUM CHLORIDE 9 MG/ML
20 INJECTION, SOLUTION INTRAVENOUS ONCE
Status: COMPLETED | OUTPATIENT
Start: 2024-09-11 | End: 2024-09-11

## 2024-09-11 RX ORDER — SODIUM CHLORIDE 9 MG/ML
20 INJECTION, SOLUTION INTRAVENOUS ONCE
Status: CANCELLED | OUTPATIENT
Start: 2024-09-18

## 2024-09-11 RX ADMIN — IRON SUCROSE 200 MG: 20 INJECTION, SOLUTION INTRAVENOUS at 08:51

## 2024-09-11 RX ADMIN — SODIUM CHLORIDE 20 ML/HR: 9 INJECTION, SOLUTION INTRAVENOUS at 08:51

## 2024-09-11 NOTE — PROGRESS NOTES
Hina Munoz  tolerated treatment well with no complications.      Hina Munoz is aware of future appt on 09/18/2024 at 08:30 AM.     AVS printed and given to Hina Munoz:  No (Declined by Hina Munoz)

## 2024-09-18 ENCOUNTER — HOSPITAL ENCOUNTER (OUTPATIENT)
Dept: INFUSION CENTER | Facility: HOSPITAL | Age: 34
Discharge: HOME | End: 2024-09-18
Payer: COMMERCIAL

## 2024-09-18 VITALS
TEMPERATURE: 97.3 F | RESPIRATION RATE: 16 BRPM | SYSTOLIC BLOOD PRESSURE: 113 MMHG | DIASTOLIC BLOOD PRESSURE: 73 MMHG | HEART RATE: 77 BPM

## 2024-09-18 DIAGNOSIS — D50.8 IRON DEFICIENCY ANEMIA SECONDARY TO INADEQUATE DIETARY IRON INTAKE: Primary | ICD-10-CM

## 2024-09-18 DIAGNOSIS — D62 ACUTE BLOOD LOSS ANEMIA: ICD-10-CM

## 2024-09-18 DIAGNOSIS — E53.8 B12 DEFICIENCY: ICD-10-CM

## 2024-09-18 PROCEDURE — 96365 THER/PROPH/DIAG IV INF INIT: CPT

## 2024-09-18 RX ORDER — SODIUM CHLORIDE 9 MG/ML
20 INJECTION, SOLUTION INTRAVENOUS ONCE
Status: COMPLETED | OUTPATIENT
Start: 2024-09-18 | End: 2024-09-18

## 2024-09-18 RX ORDER — SODIUM CHLORIDE 9 MG/ML
20 INJECTION, SOLUTION INTRAVENOUS ONCE
Status: CANCELLED | OUTPATIENT
Start: 2024-09-25

## 2024-09-18 RX ADMIN — IRON SUCROSE 200 MG: 20 INJECTION, SOLUTION INTRAVENOUS at 08:46

## 2024-09-18 RX ADMIN — SODIUM CHLORIDE 20 ML/HR: 9 INJECTION, SOLUTION INTRAVENOUS at 08:45

## 2024-09-18 NOTE — PROGRESS NOTES
Hina Munoz  tolerated treatment well with no complications.      Hina Munoz is aware of future appt on 9/25/2024 at 0830.     AVS printed and given to Hina Munoz:  No (Declined by Hina Munoz)

## 2024-09-20 NOTE — ADDENDUM NOTE
Encounter addended by: Adolfo Monte, Pharmacist on: 9/20/2024 1:59 PM   Actions taken: [employer]ent created or edited

## 2024-09-24 PROBLEM — Z00.00 WELL ADULT EXAM: Status: RESOLVED | Noted: 2023-03-27 | Resolved: 2024-09-24

## 2024-09-25 ENCOUNTER — HOSPITAL ENCOUNTER (OUTPATIENT)
Dept: INFUSION CENTER | Facility: HOSPITAL | Age: 34
Discharge: HOME | End: 2024-09-25
Payer: COMMERCIAL

## 2024-09-25 VITALS
HEART RATE: 70 BPM | SYSTOLIC BLOOD PRESSURE: 121 MMHG | RESPIRATION RATE: 16 BRPM | DIASTOLIC BLOOD PRESSURE: 56 MMHG | TEMPERATURE: 97 F | OXYGEN SATURATION: 100 %

## 2024-09-25 DIAGNOSIS — E53.8 B12 DEFICIENCY: ICD-10-CM

## 2024-09-25 DIAGNOSIS — D62 ACUTE BLOOD LOSS ANEMIA: ICD-10-CM

## 2024-09-25 DIAGNOSIS — D50.8 IRON DEFICIENCY ANEMIA SECONDARY TO INADEQUATE DIETARY IRON INTAKE: Primary | ICD-10-CM

## 2024-09-25 PROCEDURE — 96365 THER/PROPH/DIAG IV INF INIT: CPT

## 2024-09-25 RX ORDER — SODIUM CHLORIDE 9 MG/ML
20 INJECTION, SOLUTION INTRAVENOUS ONCE
Status: COMPLETED | OUTPATIENT
Start: 2024-09-25 | End: 2024-09-25

## 2024-09-25 RX ORDER — SODIUM CHLORIDE 9 MG/ML
20 INJECTION, SOLUTION INTRAVENOUS ONCE
Status: CANCELLED | OUTPATIENT
Start: 2024-10-02

## 2024-09-25 RX ADMIN — IRON SUCROSE 200 MG: 20 INJECTION, SOLUTION INTRAVENOUS at 08:35

## 2024-09-25 RX ADMIN — SODIUM CHLORIDE 20 ML/HR: 9 INJECTION, SOLUTION INTRAVENOUS at 08:35

## 2024-09-25 NOTE — PROGRESS NOTES
Hina Munoz  tolerated treatment well with no complications.      Hina Munoz is aware of future appt on 10/2 at 0830.     AVS printed and given to Hina Munoz:  No (Declined by Hina Munoz)

## 2024-10-02 ENCOUNTER — HOSPITAL ENCOUNTER (OUTPATIENT)
Dept: INFUSION CENTER | Facility: HOSPITAL | Age: 34
Discharge: HOME | End: 2024-10-02
Payer: COMMERCIAL

## 2024-10-02 VITALS
DIASTOLIC BLOOD PRESSURE: 64 MMHG | OXYGEN SATURATION: 100 % | HEART RATE: 74 BPM | TEMPERATURE: 96.8 F | RESPIRATION RATE: 16 BRPM | SYSTOLIC BLOOD PRESSURE: 114 MMHG

## 2024-10-02 DIAGNOSIS — E53.8 B12 DEFICIENCY: ICD-10-CM

## 2024-10-02 DIAGNOSIS — D50.8 IRON DEFICIENCY ANEMIA SECONDARY TO INADEQUATE DIETARY IRON INTAKE: Primary | ICD-10-CM

## 2024-10-02 DIAGNOSIS — D62 ACUTE BLOOD LOSS ANEMIA: ICD-10-CM

## 2024-10-02 PROCEDURE — 96365 THER/PROPH/DIAG IV INF INIT: CPT

## 2024-10-02 RX ORDER — SODIUM CHLORIDE 9 MG/ML
20 INJECTION, SOLUTION INTRAVENOUS ONCE
Status: COMPLETED | OUTPATIENT
Start: 2024-10-02 | End: 2024-10-02

## 2024-10-02 RX ORDER — SODIUM CHLORIDE 9 MG/ML
20 INJECTION, SOLUTION INTRAVENOUS ONCE
Status: CANCELLED | OUTPATIENT
Start: 2024-10-09

## 2024-10-02 RX ADMIN — IRON SUCROSE 200 MG: 20 INJECTION, SOLUTION INTRAVENOUS at 08:45

## 2024-10-02 RX ADMIN — SODIUM CHLORIDE 20 ML/HR: 9 INJECTION, SOLUTION INTRAVENOUS at 08:41

## 2024-10-02 NOTE — PROGRESS NOTES
Hina Munoz  tolerated treatment well with no complications.      Hina Munoz is aware of future appt on 10/09/2024 at 0830.     AVS printed and given to Hina Munoz:  No (Declined by Hina Munoz)

## 2024-10-09 ENCOUNTER — HOSPITAL ENCOUNTER (OUTPATIENT)
Dept: INFUSION CENTER | Facility: HOSPITAL | Age: 34
Discharge: HOME | End: 2024-10-09
Payer: COMMERCIAL

## 2024-10-09 VITALS
RESPIRATION RATE: 16 BRPM | TEMPERATURE: 97.6 F | SYSTOLIC BLOOD PRESSURE: 115 MMHG | HEART RATE: 83 BPM | OXYGEN SATURATION: 100 % | DIASTOLIC BLOOD PRESSURE: 67 MMHG

## 2024-10-09 DIAGNOSIS — E53.8 B12 DEFICIENCY: ICD-10-CM

## 2024-10-09 DIAGNOSIS — D50.8 IRON DEFICIENCY ANEMIA SECONDARY TO INADEQUATE DIETARY IRON INTAKE: Primary | ICD-10-CM

## 2024-10-09 DIAGNOSIS — D62 ACUTE BLOOD LOSS ANEMIA: ICD-10-CM

## 2024-10-09 PROCEDURE — 96365 THER/PROPH/DIAG IV INF INIT: CPT

## 2024-10-09 RX ORDER — SODIUM CHLORIDE 9 MG/ML
20 INJECTION, SOLUTION INTRAVENOUS ONCE
Status: CANCELLED | OUTPATIENT
Start: 2024-10-09

## 2024-10-09 RX ORDER — SODIUM CHLORIDE 9 MG/ML
20 INJECTION, SOLUTION INTRAVENOUS ONCE
Status: COMPLETED | OUTPATIENT
Start: 2024-10-09 | End: 2024-10-09

## 2024-10-09 RX ADMIN — SODIUM CHLORIDE 20 ML/HR: 9 INJECTION, SOLUTION INTRAVENOUS at 08:43

## 2024-10-09 RX ADMIN — IRON SUCROSE 200 MG: 20 INJECTION, SOLUTION INTRAVENOUS at 08:43

## 2024-10-09 NOTE — PROGRESS NOTES
Pt tolerated treatment with no adv reactions; therapy plan completed; pt left unit ambulatory with steady gait.

## 2025-04-09 DIAGNOSIS — E16.2 HYPOGLYCEMIA: Primary | ICD-10-CM

## 2025-04-18 ENCOUNTER — APPOINTMENT (OUTPATIENT)
Dept: LAB | Facility: HOSPITAL | Age: 35
End: 2025-04-18
Payer: COMMERCIAL

## 2025-04-18 DIAGNOSIS — Z48.815 ENCOUNTER FOR SURGICAL AFTERCARE FOLLOWING SURGERY OF DIGESTIVE SYSTEM: ICD-10-CM

## 2025-04-18 DIAGNOSIS — D50.8 IRON DEFICIENCY ANEMIA SECONDARY TO INADEQUATE DIETARY IRON INTAKE: ICD-10-CM

## 2025-04-18 DIAGNOSIS — D62 ACUTE BLOOD LOSS ANEMIA: ICD-10-CM

## 2025-04-18 DIAGNOSIS — E16.2 HYPOGLYCEMIA: ICD-10-CM

## 2025-04-18 DIAGNOSIS — E66.811 OBESITY, CLASS I, BMI 30-34.9: ICD-10-CM

## 2025-04-18 DIAGNOSIS — K91.2 POSTSURGICAL MALABSORPTION: ICD-10-CM

## 2025-04-18 DIAGNOSIS — E53.8 B12 DEFICIENCY: ICD-10-CM

## 2025-04-18 DIAGNOSIS — Z98.84 BARIATRIC SURGERY STATUS: ICD-10-CM

## 2025-04-18 LAB
25(OH)D3 SERPL-MCNC: 28.2 NG/ML (ref 30–100)
ALBUMIN SERPL BCG-MCNC: 4.3 G/DL (ref 3.5–5)
ALP SERPL-CCNC: 46 U/L (ref 34–104)
ALT SERPL W P-5'-P-CCNC: 37 U/L (ref 7–52)
ANION GAP SERPL CALCULATED.3IONS-SCNC: 2 MMOL/L (ref 4–13)
AST SERPL W P-5'-P-CCNC: 28 U/L (ref 13–39)
BILIRUB SERPL-MCNC: 0.72 MG/DL (ref 0.2–1)
BUN SERPL-MCNC: 14 MG/DL (ref 5–25)
CALCIUM SERPL-MCNC: 9 MG/DL (ref 8.4–10.2)
CHLORIDE SERPL-SCNC: 105 MMOL/L (ref 96–108)
CO2 SERPL-SCNC: 28 MMOL/L (ref 21–32)
CREAT SERPL-MCNC: 0.64 MG/DL (ref 0.6–1.3)
ERYTHROCYTE [DISTWIDTH] IN BLOOD BY AUTOMATED COUNT: 12.3 % (ref 11.6–15.1)
EST. AVERAGE GLUCOSE BLD GHB EST-MCNC: 85 MG/DL
FERRITIN SERPL-MCNC: 44 NG/ML (ref 30–307)
FOLATE SERPL-MCNC: 9.1 NG/ML
GFR SERPL CREATININE-BSD FRML MDRD: 117 ML/MIN/1.73SQ M
GLUCOSE SERPL-MCNC: 86 MG/DL (ref 65–140)
HBA1C MFR BLD: 4.6 %
HCT VFR BLD AUTO: 40 % (ref 34.8–46.1)
HGB BLD-MCNC: 14 G/DL (ref 11.5–15.4)
IRON SATN MFR SERPL: 40 % (ref 15–50)
IRON SERPL-MCNC: 111 UG/DL (ref 50–212)
MCH RBC QN AUTO: 31.5 PG (ref 26.8–34.3)
MCHC RBC AUTO-ENTMCNC: 35 G/DL (ref 31.4–37.4)
MCV RBC AUTO: 90 FL (ref 82–98)
PLATELET # BLD AUTO: 199 THOUSANDS/UL (ref 149–390)
PMV BLD AUTO: 9.3 FL (ref 8.9–12.7)
POTASSIUM SERPL-SCNC: 3.7 MMOL/L (ref 3.5–5.3)
PROT SERPL-MCNC: 6.8 G/DL (ref 6.4–8.4)
PTH-INTACT SERPL-MCNC: 33.4 PG/ML (ref 12–88)
RBC # BLD AUTO: 4.44 MILLION/UL (ref 3.81–5.12)
SODIUM SERPL-SCNC: 135 MMOL/L (ref 135–147)
TIBC SERPL-MCNC: 280 UG/DL (ref 250–450)
TRANSFERRIN SERPL-MCNC: 200 MG/DL (ref 203–362)
UIBC SERPL-MCNC: 169 UG/DL (ref 155–355)
VIT B12 SERPL-MCNC: 224 PG/ML (ref 180–914)
WBC # BLD AUTO: 5.56 THOUSAND/UL (ref 4.31–10.16)

## 2025-04-18 PROCEDURE — 36415 COLL VENOUS BLD VENIPUNCTURE: CPT

## 2025-04-18 PROCEDURE — 84590 ASSAY OF VITAMIN A: CPT

## 2025-04-18 PROCEDURE — 82306 VITAMIN D 25 HYDROXY: CPT

## 2025-04-18 PROCEDURE — 84425 ASSAY OF VITAMIN B-1: CPT

## 2025-04-18 PROCEDURE — 83540 ASSAY OF IRON: CPT

## 2025-04-18 PROCEDURE — 84630 ASSAY OF ZINC: CPT

## 2025-04-18 PROCEDURE — 82746 ASSAY OF FOLIC ACID SERUM: CPT

## 2025-04-18 PROCEDURE — 80053 COMPREHEN METABOLIC PANEL: CPT

## 2025-04-18 PROCEDURE — 83036 HEMOGLOBIN GLYCOSYLATED A1C: CPT

## 2025-04-18 PROCEDURE — 82728 ASSAY OF FERRITIN: CPT

## 2025-04-18 PROCEDURE — 83970 ASSAY OF PARATHORMONE: CPT

## 2025-04-18 PROCEDURE — 82607 VITAMIN B-12: CPT

## 2025-04-18 PROCEDURE — 85027 COMPLETE CBC AUTOMATED: CPT

## 2025-04-18 PROCEDURE — 83550 IRON BINDING TEST: CPT

## 2025-04-21 LAB — ZINC SERPL-MCNC: 68 UG/DL (ref 44–115)

## 2025-04-22 LAB
VIT A SERPL-MCNC: 18 UG/DL (ref 18.9–57.3)
VIT B1 BLD-SCNC: 107 NMOL/L (ref 66.5–200)

## 2025-04-23 ENCOUNTER — RESULTS FOLLOW-UP (OUTPATIENT)
Dept: BARIATRICS | Facility: CLINIC | Age: 35
End: 2025-04-23

## 2025-04-23 DIAGNOSIS — E66.811 OBESITY, CLASS I, BMI 30-34.9: Primary | ICD-10-CM

## 2025-04-23 DIAGNOSIS — R79.89 LOW SERUM VITAMIN A: ICD-10-CM

## 2025-05-01 ENCOUNTER — RESULTS FOLLOW-UP (OUTPATIENT)
Dept: FAMILY MEDICINE CLINIC | Facility: CLINIC | Age: 35
End: 2025-05-01

## 2025-08-08 ENCOUNTER — TELEPHONE (OUTPATIENT)
Dept: BARIATRICS | Facility: CLINIC | Age: 35
End: 2025-08-08

## 2025-08-15 ENCOUNTER — TELEPHONE (OUTPATIENT)
Dept: BARIATRICS | Facility: CLINIC | Age: 35
End: 2025-08-15

## (undated) DEVICE — VIOLET BRAIDED (POLYGLACTIN 910), SYNTHETIC ABSORBABLE SUTURE: Brand: COATED VICRYL

## (undated) DEVICE — ADHESIVE SKIN CLSR DERMABOND NX

## (undated) DEVICE — STAPLER ENDO GIA ROTICULATOR 60-2.5

## (undated) DEVICE — SCD SEQUENTIAL COMPRESSION COMFORT SLEEVE MEDIUM KNEE LENGTH: Brand: KENDALL SCD

## (undated) DEVICE — GLOVE SRG BIOGEL 8

## (undated) DEVICE — LIGACLIP 10-M/L, 10MM ENDOSCOPIC ROTATING MULTIPLE CLIP APPLIERS: Brand: LIGACLIP

## (undated) DEVICE — DECANTER: Brand: UNBRANDED

## (undated) DEVICE — POWER SHELL SIGNIA

## (undated) DEVICE — GLOVE SRG BIOGEL 7.5

## (undated) DEVICE — GLOVE INDICATOR PI UNDERGLOVE SZ 6.5 BLUE

## (undated) DEVICE — INTENDED FOR TISSUE SEPARATION, AND OTHER PROCEDURES THAT REQUIRE A SHARP SURGICAL BLADE TO PUNCTURE OR CUT.: Brand: BARD-PARKER SAFETY BLADES SIZE 15, STERILE

## (undated) DEVICE — INTENDED FOR TISSUE SEPARATION, AND OTHER PROCEDURES THAT REQUIRE A SHARP SURGICAL BLADE TO PUNCTURE OR CUT.: Brand: BARD-PARKER SAFETY BLADES SIZE 11, STERILE

## (undated) DEVICE — 2000CC GUARDIAN II: Brand: GUARDIAN

## (undated) DEVICE — ELECTRODE LAP SPATULA STR E-Z CLEAN 33CM -0018

## (undated) DEVICE — SYRINGE 30ML LL

## (undated) DEVICE — ALLENTOWN LAP CHOLE APP PACK: Brand: CARDINAL HEALTH

## (undated) DEVICE — ENDOPOUCH RETRIEVER SPECIMEN RETRIEVAL BAGS: Brand: ENDOPOUCH RETRIEVER

## (undated) DEVICE — WEBRIL 6 IN UNSTERILE

## (undated) DEVICE — IRRIG ENDO FLO TUBING

## (undated) DEVICE — METZENBAUM ADTEC SINGLE USE DISSECTING SCISSORS, SHAFT ONLY, MONOPOLAR, CURVED TO LEFT, WORKING LENGTH: 12 1/4", (310 MM), DIAM. 5 MM, INSULATED, DOUBLE ACTION, STERILE, DISPOSABLE, PACKAGE OF 10 PIECES: Brand: AESCULAP

## (undated) DEVICE — [HIGH FLOW INSUFFLATOR,  DO NOT USE IF PACKAGE IS DAMAGED,  KEEP DRY,  KEEP AWAY FROM SUNLIGHT,  PROTECT FROM HEAT AND RADIOACTIVE SOURCES.]: Brand: PNEUMOSURE

## (undated) DEVICE — URETERAL CATHETER ADAPTOR TIP

## (undated) DEVICE — TROCAR: Brand: KII® SLEEVE

## (undated) DEVICE — TISSUE RETRIEVAL SYSTEM: Brand: INZII RETRIEVAL SYSTEM

## (undated) DEVICE — ADHESIVE SKIN HIGH VISCOSITY EXOFIN 1ML

## (undated) DEVICE — DRAPE EQUIPMENT RF WAND

## (undated) DEVICE — SINGLE-USE BIOPSY FORCEPS: Brand: RADIAL JAW 4

## (undated) DEVICE — 10 MM BABCOCKS WITH RATCHET HANDLES: Brand: ENDOPATH

## (undated) DEVICE — ELECTRODE LAP L WIRE E-Z CLEAN 33CM -0100

## (undated) DEVICE — ENDO STITCH DEVICE 10 MM

## (undated) DEVICE — TIBURON LAPAROSCOPIC ABDOMINAL DRAPE: Brand: CONVERTORS

## (undated) DEVICE — TRAVELKIT CONTAINS FIRST STEP KIT (200ML EP-4 KIT) AND SOILED SCOPE BAG - 1 KIT: Brand: TRAVELKIT CONTAINS FIRST STEP KIT AND SOILED SCOPE BAG

## (undated) DEVICE — GOWN,SLEEVE,STERILE,W/CSR WRAP,1/P: Brand: MEDLINE

## (undated) DEVICE — TUBING SMOKE EVAC W/FILTRATION DEVICE PLUMEPORT ACTIV

## (undated) DEVICE — STAPLER EEA 25 MM COVIDIEN

## (undated) DEVICE — LIGAMAX 5 MM ENDOSCOPIC MULTIPLE CLIP APPLIER: Brand: LIGAMAX

## (undated) DEVICE — SYRINGE 20ML LL

## (undated) DEVICE — TROCAR: Brand: KII FIOS FIRST ENTRY

## (undated) DEVICE — SUT VICRYL 0 UR-6 27 IN J603H

## (undated) DEVICE — TROCAR VISIPORT

## (undated) DEVICE — 5 MM CURVED DISSECTORS WITH MONOPOLAR CAUTERY: Brand: ENDOPATH

## (undated) DEVICE — LAPAROSCOPIC TROCAR SLEEVE/SINGLE USE: Brand: KII® SLEEVE

## (undated) DEVICE — TUBING SUCTION 5MM X 12 FT

## (undated) DEVICE — COVIDIEN ENDO GIA PURPLE (MED) RELOAD 60MM

## (undated) DEVICE — PMI DISPOSABLE PUNCTURE CLOSURE DEVICE / SUTURE GRASPER: Brand: PMI

## (undated) DEVICE — ELECTRODE BLADE MOD E-Z CLEAN  2.75IN 7CM -0012AM

## (undated) DEVICE — SPONGE LAP 18 X 18 IN STRL RFD

## (undated) DEVICE — ENDO STITCH 2-0 VICRYL

## (undated) DEVICE — Device

## (undated) DEVICE — 3000CC GUARDIAN II: Brand: GUARDIAN

## (undated) DEVICE — INSUFFLATION TUBING PRIMFLO

## (undated) DEVICE — SURGICAL GOWN, XL SMARTSLEEVE: Brand: CONVERTORS

## (undated) DEVICE — NEEDLE SPINAL18G X 3.5 IN QUINCKE

## (undated) DEVICE — SUT MONOCRYL 4-0 PS-2 27 IN Y426H

## (undated) DEVICE — TOWEL SURG XR DETECT GREEN STRL RFD

## (undated) DEVICE — BLUE HEAT SCOPE WARMER

## (undated) DEVICE — HARMONIC 1100 SHEARS, 36CM SHAFT LENGTH: Brand: HARMONIC

## (undated) DEVICE — GLOVE INDICATOR PI UNDERGLOVE SZ 8 BLUE

## (undated) DEVICE — ENDO STITCH 2-0 SURGIDAC 48 IN

## (undated) DEVICE — TROCARS: Brand: KII® BALLOON BLUNT TIP SYSTEM

## (undated) DEVICE — NEPTUNE E-SEP SMOKE EVACUATION PENCIL, COATED, 70MM BLADE, PUSH BUTTON SWITCH: Brand: NEPTUNE E-SEP

## (undated) DEVICE — PAD GROUNDING ADULT

## (undated) DEVICE — GLOVE SRG BIOGEL ECLIPSE 8

## (undated) DEVICE — CHLORAPREP HI-LITE 26ML ORANGE

## (undated) DEVICE — SPONGE 4 X 4 XRAY 16 PLY STRL LF RFD

## (undated) DEVICE — NEEDLE HYPO 22G X 1-1/2 IN

## (undated) DEVICE — VISUALIZATION SYSTEM: Brand: CLEARIFY

## (undated) DEVICE — ENDOPATH 5MM CURVED SCISSORS WITH MONOPOLAR CAUTERY: Brand: ENDOPATH

## (undated) DEVICE — GLOVE SRG BIOGEL 6.5